# Patient Record
Sex: MALE | Race: WHITE | Employment: OTHER | URBAN - METROPOLITAN AREA
[De-identification: names, ages, dates, MRNs, and addresses within clinical notes are randomized per-mention and may not be internally consistent; named-entity substitution may affect disease eponyms.]

---

## 2017-03-21 PROBLEM — S06.5XAA SUBDURAL HEMATOMA: Status: ACTIVE | Noted: 2017-03-21

## 2017-03-23 PROBLEM — I10 HTN (HYPERTENSION): Status: ACTIVE | Noted: 2017-03-23

## 2017-03-23 PROBLEM — F03.90 DEMENTIA (HCC): Status: ACTIVE | Noted: 2017-03-23

## 2017-03-23 PROBLEM — R25.1 TREMORS OF NERVOUS SYSTEM: Status: ACTIVE | Noted: 2017-03-23

## 2017-03-23 PROBLEM — Z86.73 H/O: CVA (CEREBROVASCULAR ACCIDENT): Status: ACTIVE | Noted: 2017-03-23

## 2017-03-23 PROBLEM — D64.9 ANEMIA: Status: ACTIVE | Noted: 2017-03-23

## 2017-03-23 PROBLEM — D69.6 THROMBOCYTOPENIA (HCC): Status: ACTIVE | Noted: 2017-03-23

## 2018-02-02 PROBLEM — R33.9 URINARY RETENTION: Status: ACTIVE | Noted: 2018-02-02

## 2018-02-05 PROBLEM — N18.30 CHRONIC KIDNEY DISEASE, STAGE III (MODERATE) (HCC): Status: ACTIVE | Noted: 2018-02-05

## 2018-02-05 PROBLEM — E86.0 DEHYDRATION: Status: RESOLVED | Noted: 2018-02-05 | Resolved: 2018-02-05

## 2018-02-05 PROBLEM — R26.9 GAIT DISORDER: Status: ACTIVE | Noted: 2018-02-05

## 2018-02-05 PROBLEM — N18.30 CHRONIC KIDNEY DISEASE, STAGE III (MODERATE) (HCC): Status: RESOLVED | Noted: 2018-02-05 | Resolved: 2018-02-05

## 2018-02-05 PROBLEM — R55 DROP ATTACK: Status: ACTIVE | Noted: 2018-02-05

## 2018-02-06 PROBLEM — E44.0 PROTEIN-CALORIE MALNUTRITION, MODERATE (HCC): Status: ACTIVE | Noted: 2018-02-06

## 2018-02-13 PROBLEM — K27.4 PEPTIC ULCER DISEASE WITH HEMORRHAGE: Status: ACTIVE | Noted: 2018-02-13

## 2018-02-13 PROBLEM — F05 DELIRIUM SECONDARY TO MULTIPLE MEDICAL PROBLEMS: Status: ACTIVE | Noted: 2018-02-13

## 2018-02-13 PROBLEM — D62 ANEMIA ASSOCIATED WITH ACUTE BLOOD LOSS: Status: ACTIVE | Noted: 2018-02-13

## 2018-05-12 ENCOUNTER — APPOINTMENT (OUTPATIENT)
Dept: GENERAL RADIOLOGY | Age: 83
DRG: 872 | End: 2018-05-12
Payer: MEDICARE

## 2018-05-12 ENCOUNTER — HOSPITAL ENCOUNTER (INPATIENT)
Age: 83
LOS: 1 days | Discharge: HOME | DRG: 872 | End: 2018-05-13
Attending: EMERGENCY MEDICINE | Admitting: INTERNAL MEDICINE
Payer: MEDICARE

## 2018-05-12 DIAGNOSIS — R53.1 GENERALIZED WEAKNESS: ICD-10-CM

## 2018-05-12 DIAGNOSIS — D69.6 THROMBOCYTOPENIA (HCC): ICD-10-CM

## 2018-05-12 DIAGNOSIS — J10.1 INFLUENZA B: Primary | ICD-10-CM

## 2018-05-12 PROBLEM — A41.9 SEPSIS (HCC): Status: ACTIVE | Noted: 2018-05-12

## 2018-05-12 LAB
ALBUMIN SERPL-MCNC: 3.7 G/DL (ref 3.5–5.2)
ALP BLD-CCNC: 95 U/L (ref 40–129)
ALT SERPL-CCNC: 8 U/L (ref 0–40)
ANION GAP SERPL CALCULATED.3IONS-SCNC: 13 MMOL/L (ref 7–16)
AST SERPL-CCNC: 23 U/L (ref 0–39)
BACTERIA: ABNORMAL /HPF
BILIRUB SERPL-MCNC: 0.5 MG/DL (ref 0–1.2)
BILIRUBIN DIRECT: <0.2 MG/DL (ref 0–0.3)
BILIRUBIN URINE: NEGATIVE
BILIRUBIN, INDIRECT: NORMAL MG/DL (ref 0–1)
BLOOD, URINE: NEGATIVE
BUN BLDV-MCNC: 21 MG/DL (ref 8–23)
CALCIUM SERPL-MCNC: 8.2 MG/DL (ref 8.6–10.2)
CHLORIDE BLD-SCNC: 99 MMOL/L (ref 98–107)
CLARITY: CLEAR
CO2: 25 MMOL/L (ref 22–29)
COLOR: YELLOW
CREAT SERPL-MCNC: 0.9 MG/DL (ref 0.7–1.2)
EPITHELIAL CELLS, UA: ABNORMAL /HPF
GFR AFRICAN AMERICAN: >60
GFR NON-AFRICAN AMERICAN: >60 ML/MIN/1.73
GLUCOSE BLD-MCNC: 98 MG/DL (ref 74–109)
GLUCOSE URINE: NEGATIVE MG/DL
HCT VFR BLD CALC: 33.6 % (ref 37–54)
HEMOGLOBIN: 10.8 G/DL (ref 12.5–16.5)
INFLUENZA A BY PCR: NOT DETECTED
INFLUENZA B BY PCR: DETECTED
KETONES, URINE: NEGATIVE MG/DL
LACTIC ACID: 1.5 MMOL/L (ref 0.5–2.2)
LEUKOCYTE ESTERASE, URINE: NEGATIVE
MCH RBC QN AUTO: 30.7 PG (ref 26–35)
MCHC RBC AUTO-ENTMCNC: 32.1 % (ref 32–34.5)
MCV RBC AUTO: 95.5 FL (ref 80–99.9)
NITRITE, URINE: NEGATIVE
PDW BLD-RTO: 13.5 FL (ref 11.5–15)
PH UA: 6.5 (ref 5–9)
PLATELET # BLD: 66 E9/L (ref 130–450)
PLATELET CONFIRMATION: NORMAL
PMV BLD AUTO: 11.9 FL (ref 7–12)
POTASSIUM SERPL-SCNC: 4.8 MMOL/L (ref 3.5–5)
PROTEIN UA: 30 MG/DL
RBC # BLD: 3.52 E12/L (ref 3.8–5.8)
RBC UA: ABNORMAL /HPF (ref 0–2)
SODIUM BLD-SCNC: 137 MMOL/L (ref 132–146)
SPECIFIC GRAVITY UA: 1.02 (ref 1–1.03)
TOTAL PROTEIN: 7.6 G/DL (ref 6.4–8.3)
TROPONIN: <0.01 NG/ML (ref 0–0.03)
UROBILINOGEN, URINE: 0.2 E.U./DL
WBC # BLD: 4.4 E9/L (ref 4.5–11.5)
WBC UA: ABNORMAL /HPF (ref 0–5)

## 2018-05-12 PROCEDURE — 87088 URINE BACTERIA CULTURE: CPT

## 2018-05-12 PROCEDURE — 87040 BLOOD CULTURE FOR BACTERIA: CPT

## 2018-05-12 PROCEDURE — 2580000003 HC RX 258: Performed by: INTERNAL MEDICINE

## 2018-05-12 PROCEDURE — G0378 HOSPITAL OBSERVATION PER HR: HCPCS

## 2018-05-12 PROCEDURE — 84484 ASSAY OF TROPONIN QUANT: CPT

## 2018-05-12 PROCEDURE — 6360000002 HC RX W HCPCS: Performed by: INTERNAL MEDICINE

## 2018-05-12 PROCEDURE — 99285 EMERGENCY DEPT VISIT HI MDM: CPT

## 2018-05-12 PROCEDURE — 1200000000 HC SEMI PRIVATE

## 2018-05-12 PROCEDURE — 80048 BASIC METABOLIC PNL TOTAL CA: CPT

## 2018-05-12 PROCEDURE — 93005 ELECTROCARDIOGRAM TRACING: CPT | Performed by: EMERGENCY MEDICINE

## 2018-05-12 PROCEDURE — 80076 HEPATIC FUNCTION PANEL: CPT

## 2018-05-12 PROCEDURE — 87502 INFLUENZA DNA AMP PROBE: CPT

## 2018-05-12 PROCEDURE — 6370000000 HC RX 637 (ALT 250 FOR IP): Performed by: INTERNAL MEDICINE

## 2018-05-12 PROCEDURE — 36415 COLL VENOUS BLD VENIPUNCTURE: CPT

## 2018-05-12 PROCEDURE — 83605 ASSAY OF LACTIC ACID: CPT

## 2018-05-12 PROCEDURE — 6370000000 HC RX 637 (ALT 250 FOR IP): Performed by: EMERGENCY MEDICINE

## 2018-05-12 PROCEDURE — 85027 COMPLETE CBC AUTOMATED: CPT

## 2018-05-12 PROCEDURE — 81001 URINALYSIS AUTO W/SCOPE: CPT

## 2018-05-12 PROCEDURE — 71045 X-RAY EXAM CHEST 1 VIEW: CPT

## 2018-05-12 PROCEDURE — 96372 THER/PROPH/DIAG INJ SC/IM: CPT

## 2018-05-12 RX ORDER — ENALAPRIL MALEATE 2.5 MG/1
2.5 TABLET ORAL DAILY
Status: DISCONTINUED | OUTPATIENT
Start: 2018-05-13 | End: 2018-05-13 | Stop reason: HOSPADM

## 2018-05-12 RX ORDER — PRIMIDONE 50 MG/1
50 TABLET ORAL 2 TIMES DAILY
Status: DISCONTINUED | OUTPATIENT
Start: 2018-05-12 | End: 2018-05-13 | Stop reason: HOSPADM

## 2018-05-12 RX ORDER — PANTOPRAZOLE SODIUM 40 MG/1
40 TABLET, DELAYED RELEASE ORAL DAILY
Status: DISCONTINUED | OUTPATIENT
Start: 2018-05-12 | End: 2018-05-13 | Stop reason: HOSPADM

## 2018-05-12 RX ORDER — DONEPEZIL HYDROCHLORIDE 5 MG/1
10 TABLET, FILM COATED ORAL NIGHTLY
Status: DISCONTINUED | OUTPATIENT
Start: 2018-05-12 | End: 2018-05-13 | Stop reason: HOSPADM

## 2018-05-12 RX ORDER — SODIUM CHLORIDE 0.9 % (FLUSH) 0.9 %
10 SYRINGE (ML) INJECTION PRN
Status: DISCONTINUED | OUTPATIENT
Start: 2018-05-12 | End: 2018-05-13 | Stop reason: HOSPADM

## 2018-05-12 RX ORDER — POLYETHYLENE GLYCOL 3350 17 G/17G
17 POWDER, FOR SOLUTION ORAL 2 TIMES DAILY
Status: DISCONTINUED | OUTPATIENT
Start: 2018-05-13 | End: 2018-05-13 | Stop reason: HOSPADM

## 2018-05-12 RX ORDER — POLYETHYLENE GLYCOL 3350 17 G/17G
17 POWDER, FOR SOLUTION ORAL DAILY
COMMUNITY

## 2018-05-12 RX ORDER — ACETAMINOPHEN 325 MG/1
650 TABLET ORAL ONCE
Status: COMPLETED | OUTPATIENT
Start: 2018-05-12 | End: 2018-05-12

## 2018-05-12 RX ORDER — ONDANSETRON 2 MG/ML
4 INJECTION INTRAMUSCULAR; INTRAVENOUS EVERY 6 HOURS PRN
Status: DISCONTINUED | OUTPATIENT
Start: 2018-05-12 | End: 2018-05-13 | Stop reason: HOSPADM

## 2018-05-12 RX ORDER — ASPIRIN 81 MG/1
81 TABLET, CHEWABLE ORAL DAILY
Status: DISCONTINUED | OUTPATIENT
Start: 2018-05-12 | End: 2018-05-13 | Stop reason: HOSPADM

## 2018-05-12 RX ORDER — OSELTAMIVIR PHOSPHATE 30 MG/1
30 CAPSULE ORAL 2 TIMES DAILY
Status: DISCONTINUED | OUTPATIENT
Start: 2018-05-12 | End: 2018-05-13 | Stop reason: HOSPADM

## 2018-05-12 RX ORDER — PRAVASTATIN SODIUM 20 MG
20 TABLET ORAL DAILY
Status: DISCONTINUED | OUTPATIENT
Start: 2018-05-12 | End: 2018-05-13 | Stop reason: HOSPADM

## 2018-05-12 RX ORDER — SODIUM CHLORIDE 0.9 % (FLUSH) 0.9 %
10 SYRINGE (ML) INJECTION EVERY 12 HOURS SCHEDULED
Status: DISCONTINUED | OUTPATIENT
Start: 2018-05-12 | End: 2018-05-13 | Stop reason: HOSPADM

## 2018-05-12 RX ADMIN — Medication 400 MG: at 21:23

## 2018-05-12 RX ADMIN — Medication 10 ML: at 21:23

## 2018-05-12 RX ADMIN — ASPIRIN 81 MG 81 MG: 81 TABLET ORAL at 21:23

## 2018-05-12 RX ADMIN — OSELTAMIVIR PHOSPHATE 30 MG: 30 CAPSULE ORAL at 13:50

## 2018-05-12 RX ADMIN — ACETAMINOPHEN 650 MG: 325 TABLET ORAL at 12:27

## 2018-05-12 RX ADMIN — ENOXAPARIN SODIUM 40 MG: 40 INJECTION SUBCUTANEOUS at 21:23

## 2018-05-12 RX ADMIN — DONEPEZIL HYDROCHLORIDE 10 MG: 5 TABLET, FILM COATED ORAL at 21:22

## 2018-05-12 RX ADMIN — OSELTAMIVIR PHOSPHATE 30 MG: 30 CAPSULE ORAL at 21:23

## 2018-05-12 ASSESSMENT — ENCOUNTER SYMPTOMS
VOMITING: 0
DIARRHEA: 0
NAUSEA: 0
SORE THROAT: 0
CHEST TIGHTNESS: 0
COUGH: 1
ABDOMINAL PAIN: 0
SHORTNESS OF BREATH: 0
CONSTIPATION: 1

## 2018-05-12 ASSESSMENT — PAIN SCALES - GENERAL
PAINLEVEL_OUTOF10: 0
PAINLEVEL_OUTOF10: 0

## 2018-05-13 VITALS
HEIGHT: 62 IN | SYSTOLIC BLOOD PRESSURE: 144 MMHG | TEMPERATURE: 98.3 F | DIASTOLIC BLOOD PRESSURE: 60 MMHG | WEIGHT: 143 LBS | RESPIRATION RATE: 16 BRPM | OXYGEN SATURATION: 91 % | HEART RATE: 63 BPM | BODY MASS INDEX: 26.31 KG/M2

## 2018-05-13 LAB
ANION GAP SERPL CALCULATED.3IONS-SCNC: 13 MMOL/L (ref 7–16)
BUN BLDV-MCNC: 17 MG/DL (ref 8–23)
CALCIUM SERPL-MCNC: 7.8 MG/DL (ref 8.6–10.2)
CHLORIDE BLD-SCNC: 99 MMOL/L (ref 98–107)
CHOLESTEROL, TOTAL: 112 MG/DL (ref 0–199)
CO2: 23 MMOL/L (ref 22–29)
CREAT SERPL-MCNC: 0.9 MG/DL (ref 0.7–1.2)
GFR AFRICAN AMERICAN: >60
GFR NON-AFRICAN AMERICAN: >60 ML/MIN/1.73
GLUCOSE BLD-MCNC: 126 MG/DL (ref 74–109)
HBA1C MFR BLD: 4.7 % (ref 4.8–5.9)
HCT VFR BLD CALC: 30.7 % (ref 37–54)
HDLC SERPL-MCNC: 56 MG/DL
HEMOGLOBIN: 10.1 G/DL (ref 12.5–16.5)
LDL CHOLESTEROL CALCULATED: 47 MG/DL (ref 0–99)
MAGNESIUM: 2.2 MG/DL (ref 1.6–2.6)
MCH RBC QN AUTO: 30.8 PG (ref 26–35)
MCHC RBC AUTO-ENTMCNC: 32.9 % (ref 32–34.5)
MCV RBC AUTO: 93.6 FL (ref 80–99.9)
PDW BLD-RTO: 13.5 FL (ref 11.5–15)
PHOSPHORUS: 2.4 MG/DL (ref 2.5–4.5)
PLATELET # BLD: 61 E9/L (ref 130–450)
PLATELET CONFIRMATION: NORMAL
PMV BLD AUTO: 11.9 FL (ref 7–12)
POTASSIUM SERPL-SCNC: 3.9 MMOL/L (ref 3.5–5)
RBC # BLD: 3.28 E12/L (ref 3.8–5.8)
SODIUM BLD-SCNC: 135 MMOL/L (ref 132–146)
TRIGL SERPL-MCNC: 46 MG/DL (ref 0–149)
TSH SERPL DL<=0.05 MIU/L-ACNC: 1.9 UIU/ML (ref 0.27–4.2)
VLDLC SERPL CALC-MCNC: 9 MG/DL
WBC # BLD: 3.2 E9/L (ref 4.5–11.5)

## 2018-05-13 PROCEDURE — 6370000000 HC RX 637 (ALT 250 FOR IP): Performed by: INTERNAL MEDICINE

## 2018-05-13 PROCEDURE — 6370000000 HC RX 637 (ALT 250 FOR IP): Performed by: EMERGENCY MEDICINE

## 2018-05-13 PROCEDURE — 2580000003 HC RX 258: Performed by: INTERNAL MEDICINE

## 2018-05-13 PROCEDURE — 83735 ASSAY OF MAGNESIUM: CPT

## 2018-05-13 PROCEDURE — 80061 LIPID PANEL: CPT

## 2018-05-13 PROCEDURE — 84100 ASSAY OF PHOSPHORUS: CPT

## 2018-05-13 PROCEDURE — 97161 PT EVAL LOW COMPLEX 20 MIN: CPT | Performed by: PHYSICAL THERAPIST

## 2018-05-13 PROCEDURE — 6360000002 HC RX W HCPCS: Performed by: INTERNAL MEDICINE

## 2018-05-13 PROCEDURE — 36415 COLL VENOUS BLD VENIPUNCTURE: CPT

## 2018-05-13 PROCEDURE — G0378 HOSPITAL OBSERVATION PER HR: HCPCS

## 2018-05-13 PROCEDURE — G8979 MOBILITY GOAL STATUS: HCPCS | Performed by: PHYSICAL THERAPIST

## 2018-05-13 PROCEDURE — 83036 HEMOGLOBIN GLYCOSYLATED A1C: CPT

## 2018-05-13 PROCEDURE — G8978 MOBILITY CURRENT STATUS: HCPCS | Performed by: PHYSICAL THERAPIST

## 2018-05-13 PROCEDURE — 80048 BASIC METABOLIC PNL TOTAL CA: CPT

## 2018-05-13 PROCEDURE — 84443 ASSAY THYROID STIM HORMONE: CPT

## 2018-05-13 PROCEDURE — 96372 THER/PROPH/DIAG INJ SC/IM: CPT

## 2018-05-13 PROCEDURE — 96374 THER/PROPH/DIAG INJ IV PUSH: CPT

## 2018-05-13 PROCEDURE — 94640 AIRWAY INHALATION TREATMENT: CPT

## 2018-05-13 PROCEDURE — 85027 COMPLETE CBC AUTOMATED: CPT

## 2018-05-13 PROCEDURE — 97116 GAIT TRAINING THERAPY: CPT | Performed by: PHYSICAL THERAPIST

## 2018-05-13 RX ORDER — IPRATROPIUM BROMIDE AND ALBUTEROL SULFATE 2.5; .5 MG/3ML; MG/3ML
1 SOLUTION RESPIRATORY (INHALATION)
Status: DISCONTINUED | OUTPATIENT
Start: 2018-05-13 | End: 2018-05-13 | Stop reason: HOSPADM

## 2018-05-13 RX ORDER — OSELTAMIVIR PHOSPHATE 30 MG/1
30 CAPSULE ORAL 2 TIMES DAILY
Qty: 10 CAPSULE | Refills: 0 | Status: SHIPPED | OUTPATIENT
Start: 2018-05-13 | End: 2018-05-18

## 2018-05-13 RX ORDER — ALBUTEROL SULFATE 90 UG/1
2 AEROSOL, METERED RESPIRATORY (INHALATION) EVERY 6 HOURS PRN
Qty: 1 INHALER | Refills: 3 | Status: SHIPPED | OUTPATIENT
Start: 2018-05-13 | End: 2019-11-12

## 2018-05-13 RX ORDER — SODIUM CHLORIDE 9 MG/ML
INJECTION, SOLUTION INTRAVENOUS CONTINUOUS
Status: ACTIVE | OUTPATIENT
Start: 2018-05-13 | End: 2018-05-13

## 2018-05-13 RX ORDER — METHYLPREDNISOLONE SODIUM SUCCINATE 125 MG/2ML
60 INJECTION, POWDER, LYOPHILIZED, FOR SOLUTION INTRAMUSCULAR; INTRAVENOUS EVERY 12 HOURS
Status: DISCONTINUED | OUTPATIENT
Start: 2018-05-13 | End: 2018-05-13 | Stop reason: HOSPADM

## 2018-05-13 RX ORDER — PREDNISONE 20 MG/1
20 TABLET ORAL DAILY
Qty: 5 TABLET | Refills: 0 | Status: SHIPPED | OUTPATIENT
Start: 2018-05-13 | End: 2018-05-18

## 2018-05-13 RX ADMIN — Medication 10 ML: at 08:30

## 2018-05-13 RX ADMIN — ASPIRIN 81 MG 81 MG: 81 TABLET ORAL at 08:30

## 2018-05-13 RX ADMIN — IPRATROPIUM BROMIDE AND ALBUTEROL SULFATE 1 AMPULE: .5; 3 SOLUTION RESPIRATORY (INHALATION) at 18:02

## 2018-05-13 RX ADMIN — METHYLPREDNISOLONE SODIUM SUCCINATE 60 MG: 125 INJECTION, POWDER, FOR SOLUTION INTRAMUSCULAR; INTRAVENOUS at 10:32

## 2018-05-13 RX ADMIN — SODIUM CHLORIDE: 9 INJECTION, SOLUTION INTRAVENOUS at 10:26

## 2018-05-13 RX ADMIN — PANTOPRAZOLE SODIUM 40 MG: 40 TABLET, DELAYED RELEASE ORAL at 08:29

## 2018-05-13 RX ADMIN — POLYETHYLENE GLYCOL (3350) 17 G: 17 POWDER, FOR SOLUTION ORAL at 11:38

## 2018-05-13 RX ADMIN — POLYETHYLENE GLYCOL (3350) 17 G: 17 POWDER, FOR SOLUTION ORAL at 08:30

## 2018-05-13 RX ADMIN — PRIMIDONE 50 MG: 50 TABLET ORAL at 08:29

## 2018-05-13 RX ADMIN — ENALAPRIL MALEATE 2.5 MG: 2.5 TABLET ORAL at 08:29

## 2018-05-13 RX ADMIN — PRAVASTATIN SODIUM 20 MG: 20 TABLET ORAL at 08:29

## 2018-05-13 RX ADMIN — OSELTAMIVIR PHOSPHATE 30 MG: 30 CAPSULE ORAL at 08:29

## 2018-05-13 RX ADMIN — ENOXAPARIN SODIUM 40 MG: 40 INJECTION SUBCUTANEOUS at 08:30

## 2018-05-13 ASSESSMENT — PAIN SCALES - GENERAL: PAINLEVEL_OUTOF10: 0

## 2018-05-14 LAB
EKG ATRIAL RATE: 79 BPM
EKG P AXIS: 4 DEGREES
EKG P-R INTERVAL: 172 MS
EKG Q-T INTERVAL: 390 MS
EKG QRS DURATION: 140 MS
EKG QTC CALCULATION (BAZETT): 447 MS
EKG R AXIS: 66 DEGREES
EKG T AXIS: 42 DEGREES
EKG VENTRICULAR RATE: 79 BPM
URINE CULTURE, ROUTINE: NORMAL

## 2018-05-17 LAB
BLOOD CULTURE, ROUTINE: NORMAL
CULTURE, BLOOD 2: NORMAL

## 2018-08-14 ENCOUNTER — HOSPITAL ENCOUNTER (EMERGENCY)
Age: 83
Discharge: HOME | End: 2018-08-14
Payer: MEDICARE

## 2018-08-14 ENCOUNTER — APPOINTMENT (OUTPATIENT)
Dept: CT IMAGING | Age: 83
End: 2018-08-14
Payer: MEDICARE

## 2018-08-14 VITALS
RESPIRATION RATE: 16 BRPM | HEART RATE: 64 BPM | OXYGEN SATURATION: 98 % | SYSTOLIC BLOOD PRESSURE: 167 MMHG | BODY MASS INDEX: 23.8 KG/M2 | TEMPERATURE: 98.2 F | HEIGHT: 65 IN | DIASTOLIC BLOOD PRESSURE: 77 MMHG

## 2018-08-14 DIAGNOSIS — S09.90XA INJURY OF HEAD, INITIAL ENCOUNTER: Primary | ICD-10-CM

## 2018-08-14 DIAGNOSIS — S01.81XA LACERATION OF FOREHEAD, INITIAL ENCOUNTER: ICD-10-CM

## 2018-08-14 PROCEDURE — 70450 CT HEAD/BRAIN W/O DYE: CPT

## 2018-08-14 PROCEDURE — 99283 EMERGENCY DEPT VISIT LOW MDM: CPT

## 2018-08-14 PROCEDURE — 6370000000 HC RX 637 (ALT 250 FOR IP): Performed by: NURSE PRACTITIONER

## 2018-08-14 PROCEDURE — 72125 CT NECK SPINE W/O DYE: CPT

## 2018-08-14 RX ORDER — DIAPER,BRIEF,INFANT-TODD,DISP
EACH MISCELLANEOUS ONCE
Status: COMPLETED | OUTPATIENT
Start: 2018-08-14 | End: 2018-08-14

## 2018-08-14 RX ADMIN — BACITRACIN ZINC: 500 OINTMENT TOPICAL at 19:38

## 2018-08-14 NOTE — ED PROVIDER NOTES
answered at this time and they are agreeable with the plan.      --------------------------------- IMPRESSION AND DISPOSITION ---------------------------------    IMPRESSION  1. Injury of head, initial encounter    2.  Laceration of forehead, initial encounter        DISPOSITION  Disposition: Discharge to home  Patient condition is good                 STEPHON Lawson CNP  08/14/18 1953

## 2018-11-03 ENCOUNTER — HOSPITAL ENCOUNTER (EMERGENCY)
Age: 83
Discharge: HOME | End: 2018-11-03
Payer: MEDICARE

## 2018-11-03 VITALS
TEMPERATURE: 97.7 F | SYSTOLIC BLOOD PRESSURE: 169 MMHG | HEART RATE: 79 BPM | OXYGEN SATURATION: 95 % | HEIGHT: 66 IN | WEIGHT: 143 LBS | BODY MASS INDEX: 22.98 KG/M2 | RESPIRATION RATE: 18 BRPM | DIASTOLIC BLOOD PRESSURE: 71 MMHG

## 2018-11-03 DIAGNOSIS — S51.012A SKIN TEAR OF LEFT ELBOW WITHOUT COMPLICATION, INITIAL ENCOUNTER: Primary | ICD-10-CM

## 2018-11-03 PROCEDURE — 99282 EMERGENCY DEPT VISIT SF MDM: CPT

## 2018-11-03 PROCEDURE — 90715 TDAP VACCINE 7 YRS/> IM: CPT | Performed by: NURSE PRACTITIONER

## 2018-11-03 PROCEDURE — 12002 RPR S/N/AX/GEN/TRNK2.6-7.5CM: CPT

## 2018-11-03 PROCEDURE — 6360000002 HC RX W HCPCS: Performed by: NURSE PRACTITIONER

## 2018-11-03 PROCEDURE — 90471 IMMUNIZATION ADMIN: CPT | Performed by: NURSE PRACTITIONER

## 2018-11-03 RX ORDER — CEPHALEXIN 500 MG/1
500 CAPSULE ORAL 4 TIMES DAILY
Qty: 20 CAPSULE | Refills: 0 | Status: SHIPPED | OUTPATIENT
Start: 2018-11-03 | End: 2018-11-08

## 2018-11-03 RX ADMIN — TETANUS TOXOID, REDUCED DIPHTHERIA TOXOID AND ACELLULAR PERTUSSIS VACCINE, ADSORBED 0.5 ML: 5; 2.5; 8; 8; 2.5 SUSPENSION INTRAMUSCULAR at 15:16

## 2019-02-28 ENCOUNTER — APPOINTMENT (OUTPATIENT)
Dept: CT IMAGING | Age: 84
End: 2019-02-28
Payer: MEDICARE

## 2019-02-28 ENCOUNTER — HOSPITAL ENCOUNTER (EMERGENCY)
Age: 84
Discharge: HOME | End: 2019-02-28
Attending: EMERGENCY MEDICINE
Payer: MEDICARE

## 2019-02-28 VITALS
RESPIRATION RATE: 14 BRPM | TEMPERATURE: 97.6 F | OXYGEN SATURATION: 97 % | HEIGHT: 67 IN | DIASTOLIC BLOOD PRESSURE: 65 MMHG | WEIGHT: 141 LBS | BODY MASS INDEX: 22.13 KG/M2 | HEART RATE: 58 BPM | SYSTOLIC BLOOD PRESSURE: 140 MMHG

## 2019-02-28 DIAGNOSIS — S09.90XA CLOSED HEAD INJURY, INITIAL ENCOUNTER: Primary | ICD-10-CM

## 2019-02-28 DIAGNOSIS — H11.31 SUBCONJUNCTIVAL HEMORRHAGE OF RIGHT EYE: ICD-10-CM

## 2019-02-28 PROCEDURE — 72125 CT NECK SPINE W/O DYE: CPT

## 2019-02-28 PROCEDURE — 70450 CT HEAD/BRAIN W/O DYE: CPT

## 2019-02-28 PROCEDURE — 99283 EMERGENCY DEPT VISIT LOW MDM: CPT

## 2019-02-28 ASSESSMENT — ENCOUNTER SYMPTOMS
SHORTNESS OF BREATH: 0
BACK PAIN: 0
VOMITING: 0
NAUSEA: 0

## 2019-03-29 ENCOUNTER — HOSPITAL ENCOUNTER (EMERGENCY)
Age: 84
Discharge: HOME | End: 2019-03-29
Attending: EMERGENCY MEDICINE
Payer: MEDICARE

## 2019-03-29 VITALS
HEIGHT: 68 IN | SYSTOLIC BLOOD PRESSURE: 126 MMHG | TEMPERATURE: 97.9 F | BODY MASS INDEX: 20.93 KG/M2 | DIASTOLIC BLOOD PRESSURE: 60 MMHG | WEIGHT: 138.13 LBS | OXYGEN SATURATION: 94 % | HEART RATE: 56 BPM | RESPIRATION RATE: 16 BRPM

## 2019-03-29 DIAGNOSIS — L03.211 CELLULITIS OF FACE: Primary | ICD-10-CM

## 2019-03-29 PROCEDURE — 99283 EMERGENCY DEPT VISIT LOW MDM: CPT

## 2019-03-29 RX ORDER — DOXYCYCLINE 100 MG/1
100 CAPSULE ORAL 2 TIMES DAILY
Qty: 20 CAPSULE | Refills: 0 | Status: SHIPPED | OUTPATIENT
Start: 2019-03-29 | End: 2019-04-08

## 2019-03-29 NOTE — ED PROVIDER NOTES
ED Attending  CC: No    HPI:  3/29/19,   Time: 11:03 AM         Jere Padron is a 80 y.o. male presenting to the ED for swelling on the left side of his face, beginning this a.m. ago. The complaint has been constant, mild in severity, and worsened by nothing. States that he woke up this morning and noted that the left eye felt swollen and painful. He is normally blind into the left eye and denies any known fall or known injury. States he feels very swollen around the left eye. Denies any known injury. Denies any fever. ROS:   Pertinent positives and negatives are stated within HPI, all other systems reviewed and are negative.  --------------------------------------------- PAST HISTORY ---------------------------------------------  Past Medical History:  has a past medical history of CAD (coronary artery disease), Hemorrhage, HTN (hypertension), Hyperlipidemia, Raynaud disease, Tremors of nervous system, and Unspecified cerebral artery occlusion with cerebral infarction. Past Surgical History:  has a past surgical history that includes Coronary angioplasty with stent. Social History:  reports that he quit smoking about 43 years ago. He has never used smokeless tobacco. He reports that he drinks alcohol. He reports that he does not use drugs. Family History: family history is not on file. The patients home medications have been reviewed. Allergies: Ativan [lorazepam]; Clindamycin/lincomycin; and Pcn [penicillins]    -------------------------------------------------- RESULTS -------------------------------------------------  All laboratory and radiology results have been personally reviewed by myself   LABS:  No results found for this visit on 03/29/19.     RADIOLOGY:  Interpreted by Radiologist.  No orders to display       ------------------------- NURSING NOTES AND VITALS REVIEWED ---------------------------   The nursing notes within the ED encounter and vital signs as below have been ---------------------------------    IMPRESSION  1.  Cellulitis of face        DISPOSITION  Disposition: Discharge to home  Patient condition is good                 STEPHON Montgomery CNP  03/30/19 0935

## 2019-04-14 ENCOUNTER — HOSPITAL ENCOUNTER (EMERGENCY)
Age: 84
Discharge: HOME | End: 2019-04-14
Payer: MEDICARE

## 2019-04-14 VITALS
OXYGEN SATURATION: 97 % | SYSTOLIC BLOOD PRESSURE: 142 MMHG | WEIGHT: 138 LBS | TEMPERATURE: 98 F | DIASTOLIC BLOOD PRESSURE: 80 MMHG | RESPIRATION RATE: 16 BRPM | HEIGHT: 66 IN | HEART RATE: 60 BPM | BODY MASS INDEX: 22.18 KG/M2

## 2019-04-14 DIAGNOSIS — H11.31 SUBCONJUNCTIVAL HEMORRHAGE OF RIGHT EYE: Primary | ICD-10-CM

## 2019-04-14 DIAGNOSIS — H10.023 OTHER MUCOPURULENT CONJUNCTIVITIS OF BOTH EYES: ICD-10-CM

## 2019-04-14 PROCEDURE — 99282 EMERGENCY DEPT VISIT SF MDM: CPT

## 2019-04-14 PROCEDURE — 6370000000 HC RX 637 (ALT 250 FOR IP): Performed by: PHYSICIAN ASSISTANT

## 2019-04-14 RX ORDER — TOBRAMYCIN 3 MG/ML
2 SOLUTION/ DROPS OPHTHALMIC ONCE
Status: COMPLETED | OUTPATIENT
Start: 2019-04-14 | End: 2019-04-14

## 2019-04-14 RX ORDER — TETRACAINE HYDROCHLORIDE 5 MG/ML
2 SOLUTION OPHTHALMIC ONCE
Status: COMPLETED | OUTPATIENT
Start: 2019-04-14 | End: 2019-04-14

## 2019-04-14 RX ORDER — TOBRAMYCIN 3 MG/ML
1 SOLUTION/ DROPS OPHTHALMIC EVERY 4 HOURS
Qty: 1 BOTTLE | Refills: 0 | Status: SHIPPED | OUTPATIENT
Start: 2019-04-14 | End: 2019-04-24

## 2019-04-14 RX ADMIN — TETRACAINE HYDROCHLORIDE 2 DROP: 5 SOLUTION OPHTHALMIC at 19:43

## 2019-04-14 RX ADMIN — FLUORESCEIN SODIUM 2 MG: 1 STRIP OPHTHALMIC at 19:43

## 2019-04-14 RX ADMIN — TOBRAMYCIN 2 DROP: 3 SOLUTION/ DROPS OPHTHALMIC at 19:43

## 2019-04-14 ASSESSMENT — PAIN DESCRIPTION - PAIN TYPE: TYPE: ACUTE PAIN

## 2019-04-14 ASSESSMENT — PAIN DESCRIPTION - ONSET: ONSET: ON-GOING

## 2019-04-14 ASSESSMENT — PAIN SCALES - GENERAL: PAINLEVEL_OUTOF10: 2

## 2019-04-14 ASSESSMENT — PAIN DESCRIPTION - LOCATION: LOCATION: EYE

## 2019-04-14 ASSESSMENT — PAIN DESCRIPTION - ORIENTATION: ORIENTATION: RIGHT;LEFT

## 2019-04-14 ASSESSMENT — PAIN - FUNCTIONAL ASSESSMENT: PAIN_FUNCTIONAL_ASSESSMENT: PREVENTS OR INTERFERES SOME ACTIVE ACTIVITIES AND ADLS

## 2019-04-14 NOTE — ED PROVIDER NOTES
[facility]  HPI:  4/14/19, Time: 6:56 PM         Alexsander Corea is a 80 y.o. male presenting to the ED for bilateral eye erythema , beginning today    The complaint has been persistent, mild in severity, and worsened by nothing patients brought in for erythema of the bilateral eyes right greater than left noted this morning patient states he has some discomfort burning  at the corner of the eye. 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 He was recently treated for cellulitis of the face 2 weeks ago which has improved. Patient is not on any blood thinners. Is been no runny nose congestion. No eye drainage or matting noted    Review of Systems:   Pertinent positives and negatives are stated within HPI, all other systems reviewed and are negative.          --------------------------------------------- PAST HISTORY ---------------------------------------------  Past Medical History:  has a past medical history of CAD (coronary artery disease), Hemorrhage, HTN (hypertension), Hyperlipidemia, Raynaud disease, Tremors of nervous system, and Unspecified cerebral artery occlusion with cerebral infarction. Past Surgical History:  has a past surgical history that includes Coronary angioplasty with stent. Social History:  reports that he quit smoking about 43 years ago. He has never used smokeless tobacco. He reports that he drinks alcohol. He reports that he does not use drugs. Family History: family history is not on file. The patients home medications have been reviewed. Allergies: Ativan [lorazepam]; Clindamycin/lincomycin; and Pcn [penicillins]    -------------------------------------------------- RESULTS -------------------------------------------------  All laboratory and radiology results have been personally reviewed by myself   LABS:  No results found for this visit on 04/14/19.     RADIOLOGY:  Interpreted by Radiologist.  No orders to display       ------------------------- NURSING NOTES AND VITALS REVIEWED ---------------------------   The nursing notes within the ED encounter and vital signs as below have been reviewed. BP (!) 142/80   Pulse 60   Temp 98 °F (36.7 °C) (Oral)   Resp 16   Ht 5' 6\" (1.676 m)   Wt 138 lb (62.6 kg)   SpO2 97%   BMI 22.27 kg/m²   Oxygen Saturation Interpretation: Normal      ---------------------------------------------------PHYSICAL EXAM--------------------------------------      Constitutional/General: Alert and oriented x3, well appearing, non toxic in NAD  Head: Normocephalic and atraumatic  Eyes: PERRL, EOMI right eye with subconjunctival hemorrhage medially. Minimal erythema of the conjunctiva of the left eye. Mouth: Oropharynx clear, handling secretions, no trismus  Neck: Supple, full ROM,   Pulmonary: Lungs clear to auscultation bilaterally, no wheezes, rales, or rhonchi. Not in respiratory distress  Cardiovascular:  Regular rate and rhythm, no murmurs, gallops, or rubs. 2+ distal pulses  Abdomen: Soft, non tender, non distended,   Extremities: Moves all extremities x 4. Warm and well perfused  Skin: warm and dry without rash  Neurologic: GCS 15,  Psych: Normal Affect      ------------------------------ ED COURSE/MEDICAL DECISION MAKING----------------------  Medications   tobramycin (TOBREX) 0.3 % ophthalmic solution 2 drop (2 drops Ophthalmic Given 4/14/19 1943)   fluorescein ophthalmic strip 2 mg (2 mg Both Eyes Given by Other 4/14/19 1943)   tetracaine (TETRAVISC) 0.5 % ophthalmic solution 2 drop (2 drops Ophthalmic Given by Other 4/14/19 1943)         ED COURSE:       Medical Decision Making:    Patient came in with complaint of bilateral eye erythema patient with subconjunctival hemorrhage of the right eye  he was treated for conjunctivitis placed on TobraDex follow-up with ophthalmology in 1-2 days      Counseling:    The emergency provider has spoken with the patient and discussed todays results, in addition to providing specific details for the

## 2019-11-12 RX ORDER — CHOLECALCIFEROL (VITAMIN D3) 125 MCG
5 CAPSULE ORAL NIGHTLY PRN
COMMUNITY
End: 2021-06-07

## 2019-11-13 ENCOUNTER — HOSPITAL ENCOUNTER (OUTPATIENT)
Age: 84
Setting detail: OUTPATIENT SURGERY
Discharge: HOME | End: 2019-11-13
Attending: INTERNAL MEDICINE | Admitting: INTERNAL MEDICINE
Payer: MEDICARE

## 2019-11-13 ENCOUNTER — ANESTHESIA EVENT (OUTPATIENT)
Dept: ENDOSCOPY | Age: 84
End: 2019-11-13
Payer: MEDICARE

## 2019-11-13 ENCOUNTER — ANESTHESIA (OUTPATIENT)
Dept: ENDOSCOPY | Age: 84
End: 2019-11-13
Payer: MEDICARE

## 2019-11-13 VITALS
DIASTOLIC BLOOD PRESSURE: 65 MMHG | SYSTOLIC BLOOD PRESSURE: 131 MMHG | RESPIRATION RATE: 10 BRPM | OXYGEN SATURATION: 97 %

## 2019-11-13 VITALS
BODY MASS INDEX: 26.46 KG/M2 | WEIGHT: 134.8 LBS | RESPIRATION RATE: 16 BRPM | OXYGEN SATURATION: 96 % | SYSTOLIC BLOOD PRESSURE: 126 MMHG | DIASTOLIC BLOOD PRESSURE: 70 MMHG | HEIGHT: 60 IN | TEMPERATURE: 96.6 F | HEART RATE: 64 BPM

## 2019-11-13 DIAGNOSIS — Z01.818 PREOPERATIVE TESTING: Primary | ICD-10-CM

## 2019-11-13 LAB
ALBUMIN SERPL-MCNC: 4 G/DL (ref 3.5–5.2)
ALP BLD-CCNC: 111 U/L (ref 40–129)
ALT SERPL-CCNC: 7 U/L (ref 0–40)
ANION GAP SERPL CALCULATED.3IONS-SCNC: 12 MMOL/L (ref 7–16)
AST SERPL-CCNC: 23 U/L (ref 0–39)
BILIRUB SERPL-MCNC: 0.4 MG/DL (ref 0–1.2)
BUN BLDV-MCNC: 34 MG/DL (ref 8–23)
CALCIUM SERPL-MCNC: 9.2 MG/DL (ref 8.6–10.2)
CHLORIDE BLD-SCNC: 109 MMOL/L (ref 98–107)
CO2: 22 MMOL/L (ref 22–29)
CREAT SERPL-MCNC: 1.2 MG/DL (ref 0.7–1.2)
GFR AFRICAN AMERICAN: >60
GFR NON-AFRICAN AMERICAN: 56 ML/MIN/1.73
GLUCOSE BLD-MCNC: 108 MG/DL (ref 74–99)
HCT VFR BLD CALC: 30.4 % (ref 37–54)
HEMOGLOBIN: 9.7 G/DL (ref 12.5–16.5)
MCH RBC QN AUTO: 31.7 PG (ref 26–35)
MCHC RBC AUTO-ENTMCNC: 31.9 % (ref 32–34.5)
MCV RBC AUTO: 99.3 FL (ref 80–99.9)
PDW BLD-RTO: 13.5 FL (ref 11.5–15)
PLATELET # BLD: 115 E9/L (ref 130–450)
PMV BLD AUTO: 11.9 FL (ref 7–12)
POTASSIUM REFLEX MAGNESIUM: 4.8 MMOL/L (ref 3.5–5)
RBC # BLD: 3.06 E12/L (ref 3.8–5.8)
SODIUM BLD-SCNC: 143 MMOL/L (ref 132–146)
TOTAL PROTEIN: 8 G/DL (ref 6.4–8.3)
WBC # BLD: 4.6 E9/L (ref 4.5–11.5)

## 2019-11-13 PROCEDURE — 80053 COMPREHEN METABOLIC PANEL: CPT

## 2019-11-13 PROCEDURE — 3700000001 HC ADD 15 MINUTES (ANESTHESIA): Performed by: INTERNAL MEDICINE

## 2019-11-13 PROCEDURE — 88305 TISSUE EXAM BY PATHOLOGIST: CPT

## 2019-11-13 PROCEDURE — 2580000003 HC RX 258: Performed by: NURSE ANESTHETIST, CERTIFIED REGISTERED

## 2019-11-13 PROCEDURE — 88312 SPECIAL STAINS GROUP 1: CPT

## 2019-11-13 PROCEDURE — 3700000000 HC ANESTHESIA ATTENDED CARE: Performed by: INTERNAL MEDICINE

## 2019-11-13 PROCEDURE — C9113 INJ PANTOPRAZOLE SODIUM, VIA: HCPCS | Performed by: INTERNAL MEDICINE

## 2019-11-13 PROCEDURE — 6360000002 HC RX W HCPCS: Performed by: NURSE ANESTHETIST, CERTIFIED REGISTERED

## 2019-11-13 PROCEDURE — 7100000011 HC PHASE II RECOVERY - ADDTL 15 MIN: Performed by: INTERNAL MEDICINE

## 2019-11-13 PROCEDURE — 3609012400 HC EGD TRANSORAL BIOPSY SINGLE/MULTIPLE: Performed by: INTERNAL MEDICINE

## 2019-11-13 PROCEDURE — 7100000010 HC PHASE II RECOVERY - FIRST 15 MIN: Performed by: INTERNAL MEDICINE

## 2019-11-13 PROCEDURE — 6360000002 HC RX W HCPCS: Performed by: INTERNAL MEDICINE

## 2019-11-13 PROCEDURE — 2709999900 HC NON-CHARGEABLE SUPPLY: Performed by: INTERNAL MEDICINE

## 2019-11-13 PROCEDURE — 85027 COMPLETE CBC AUTOMATED: CPT

## 2019-11-13 PROCEDURE — 2580000003 HC RX 258: Performed by: ANESTHESIOLOGY

## 2019-11-13 PROCEDURE — 36415 COLL VENOUS BLD VENIPUNCTURE: CPT

## 2019-11-13 RX ORDER — SODIUM CHLORIDE 0.9 % (FLUSH) 0.9 %
10 SYRINGE (ML) INJECTION EVERY 12 HOURS SCHEDULED
Status: DISCONTINUED | OUTPATIENT
Start: 2019-11-13 | End: 2019-11-13 | Stop reason: HOSPADM

## 2019-11-13 RX ORDER — SODIUM CHLORIDE, SODIUM LACTATE, POTASSIUM CHLORIDE, CALCIUM CHLORIDE 600; 310; 30; 20 MG/100ML; MG/100ML; MG/100ML; MG/100ML
INJECTION, SOLUTION INTRAVENOUS CONTINUOUS PRN
Status: DISCONTINUED | OUTPATIENT
Start: 2019-11-13 | End: 2019-11-13 | Stop reason: SDUPTHER

## 2019-11-13 RX ORDER — PROPOFOL 10 MG/ML
INJECTION, EMULSION INTRAVENOUS PRN
Status: DISCONTINUED | OUTPATIENT
Start: 2019-11-13 | End: 2019-11-13 | Stop reason: SDUPTHER

## 2019-11-13 RX ORDER — SODIUM CHLORIDE 9 MG/ML
10 INJECTION INTRAVENOUS DAILY
Status: DISCONTINUED | OUTPATIENT
Start: 2019-11-13 | End: 2019-11-13 | Stop reason: HOSPADM

## 2019-11-13 RX ORDER — SODIUM CHLORIDE, SODIUM LACTATE, POTASSIUM CHLORIDE, CALCIUM CHLORIDE 600; 310; 30; 20 MG/100ML; MG/100ML; MG/100ML; MG/100ML
INJECTION, SOLUTION INTRAVENOUS CONTINUOUS
Status: DISCONTINUED | OUTPATIENT
Start: 2019-11-13 | End: 2019-11-13 | Stop reason: HOSPADM

## 2019-11-13 RX ORDER — PANTOPRAZOLE SODIUM 40 MG/10ML
40 INJECTION, POWDER, LYOPHILIZED, FOR SOLUTION INTRAVENOUS ONCE
Status: COMPLETED | OUTPATIENT
Start: 2019-11-13 | End: 2019-11-13

## 2019-11-13 RX ORDER — SODIUM CHLORIDE 0.9 % (FLUSH) 0.9 %
10 SYRINGE (ML) INJECTION PRN
Status: DISCONTINUED | OUTPATIENT
Start: 2019-11-13 | End: 2019-11-13 | Stop reason: HOSPADM

## 2019-11-13 RX ADMIN — PANTOPRAZOLE SODIUM 40 MG: 40 INJECTION, POWDER, FOR SOLUTION INTRAVENOUS at 11:10

## 2019-11-13 RX ADMIN — SODIUM CHLORIDE, POTASSIUM CHLORIDE, SODIUM LACTATE AND CALCIUM CHLORIDE: 600; 310; 30; 20 INJECTION, SOLUTION INTRAVENOUS at 09:38

## 2019-11-13 RX ADMIN — PROPOFOL 120 MG: 10 INJECTION, EMULSION INTRAVENOUS at 09:53

## 2019-11-13 RX ADMIN — SODIUM CHLORIDE, POTASSIUM CHLORIDE, SODIUM LACTATE AND CALCIUM CHLORIDE: 600; 310; 30; 20 INJECTION, SOLUTION INTRAVENOUS at 08:51

## 2019-11-13 ASSESSMENT — PAIN - FUNCTIONAL ASSESSMENT: PAIN_FUNCTIONAL_ASSESSMENT: 0-10

## 2020-07-23 LAB
VITAMIN D 25-HYDROXY: NORMAL
VITAMIN D2, 25 HYDROXY: NORMAL
VITAMIN D3,25 HYDROXY: NORMAL

## 2020-08-19 ENCOUNTER — TELEPHONE (OUTPATIENT)
Dept: ENT CLINIC | Age: 85
End: 2020-08-19

## 2020-08-19 NOTE — TELEPHONE ENCOUNTER
Pt's daughter called in to schedule an appt for the pt for epistaxis. He is bleeding pretty badly. She wanted him to be seen today or tomorrow. He is a prior pt. LOV: 12/14/2014. I did advised of going to the ED, since he is 89 they do not want to take him to the hospital because of covid-19. She did not want to schedule for first available. She would like the office to call her back today.

## 2020-09-21 LAB
HCT VFR BLD CALC: NORMAL %
HEMOGLOBIN: NORMAL
PLATELET # BLD: NORMAL 10*3/UL
WBC # BLD: NORMAL 10*3/UL

## 2021-01-01 ENCOUNTER — APPOINTMENT (OUTPATIENT)
Dept: GENERAL RADIOLOGY | Age: 86
DRG: 522 | End: 2021-01-01
Payer: MEDICARE

## 2021-01-01 ENCOUNTER — TELEPHONE (OUTPATIENT)
Dept: PRIMARY CARE CLINIC | Age: 86
End: 2021-01-01

## 2021-01-01 ENCOUNTER — OFFICE VISIT (OUTPATIENT)
Dept: SURGERY | Age: 86
End: 2021-01-01
Payer: MEDICARE

## 2021-01-01 ENCOUNTER — OFFICE VISIT (OUTPATIENT)
Dept: ORTHOPEDIC SURGERY | Age: 86
End: 2021-01-01
Payer: MEDICARE

## 2021-01-01 ENCOUNTER — HOSPITAL ENCOUNTER (INPATIENT)
Age: 86
LOS: 8 days | Discharge: NURSING, REHAB OR LONG-TERM CARE | DRG: 522 | End: 2021-10-21
Attending: EMERGENCY MEDICINE | Admitting: INTERNAL MEDICINE
Payer: MEDICARE

## 2021-01-01 ENCOUNTER — HOSPITAL ENCOUNTER (EMERGENCY)
Age: 86
Discharge: ANOTHER HOSPITAL | End: 2021-10-13
Payer: MEDICARE

## 2021-01-01 ENCOUNTER — ANESTHESIA EVENT (OUTPATIENT)
Dept: OPERATING ROOM | Age: 86
DRG: 522 | End: 2021-01-01
Payer: MEDICARE

## 2021-01-01 ENCOUNTER — TELEPHONE (OUTPATIENT)
Dept: SURGERY | Age: 86
End: 2021-01-01

## 2021-01-01 ENCOUNTER — ANESTHESIA (OUTPATIENT)
Dept: ENDOSCOPY | Age: 86
DRG: 522 | End: 2021-01-01
Payer: MEDICARE

## 2021-01-01 ENCOUNTER — HOSPITAL ENCOUNTER (OUTPATIENT)
Dept: GENERAL RADIOLOGY | Age: 86
Discharge: HOME | End: 2021-12-03
Payer: MEDICARE

## 2021-01-01 ENCOUNTER — HOSPITAL ENCOUNTER (OUTPATIENT)
Dept: WOUND CARE | Age: 86
Discharge: HOME | End: 2021-09-03
Payer: MEDICARE

## 2021-01-01 ENCOUNTER — HOSPITAL ENCOUNTER (EMERGENCY)
Age: 86
Discharge: HOME | End: 2021-12-06
Payer: MEDICARE

## 2021-01-01 ENCOUNTER — OFFICE VISIT (OUTPATIENT)
Dept: PRIMARY CARE CLINIC | Age: 86
End: 2021-01-01
Payer: MEDICARE

## 2021-01-01 ENCOUNTER — ANESTHESIA EVENT (OUTPATIENT)
Dept: ENDOSCOPY | Age: 86
DRG: 522 | End: 2021-01-01
Payer: MEDICARE

## 2021-01-01 ENCOUNTER — HOSPITAL ENCOUNTER (OUTPATIENT)
Dept: GENERAL RADIOLOGY | Age: 86
Discharge: HOME | End: 2021-11-07
Payer: MEDICARE

## 2021-01-01 ENCOUNTER — APPOINTMENT (OUTPATIENT)
Dept: CT IMAGING | Age: 86
DRG: 522 | End: 2021-01-01
Payer: MEDICARE

## 2021-01-01 ENCOUNTER — APPOINTMENT (OUTPATIENT)
Dept: GENERAL RADIOLOGY | Age: 86
End: 2021-01-01
Payer: MEDICARE

## 2021-01-01 ENCOUNTER — HOSPITAL ENCOUNTER (OUTPATIENT)
Dept: WOUND CARE | Age: 86
Discharge: HOME | End: 2021-09-17
Payer: MEDICARE

## 2021-01-01 ENCOUNTER — ANESTHESIA (OUTPATIENT)
Dept: OPERATING ROOM | Age: 86
DRG: 522 | End: 2021-01-01
Payer: MEDICARE

## 2021-01-01 ENCOUNTER — HOSPITAL ENCOUNTER (EMERGENCY)
Age: 86
Discharge: HOME | End: 2021-09-20
Attending: EMERGENCY MEDICINE
Payer: MEDICARE

## 2021-01-01 ENCOUNTER — HOSPITAL ENCOUNTER (OUTPATIENT)
Dept: GENERAL RADIOLOGY | Age: 86
Discharge: HOME | End: 2021-11-03
Payer: MEDICARE

## 2021-01-01 VITALS
HEIGHT: 69 IN | HEART RATE: 78 BPM | OXYGEN SATURATION: 99 % | SYSTOLIC BLOOD PRESSURE: 126 MMHG | RESPIRATION RATE: 16 BRPM | WEIGHT: 145.06 LBS | TEMPERATURE: 97.1 F | BODY MASS INDEX: 21.49 KG/M2 | DIASTOLIC BLOOD PRESSURE: 61 MMHG

## 2021-01-01 VITALS
HEIGHT: 69 IN | RESPIRATION RATE: 16 BRPM | TEMPERATURE: 97.1 F | DIASTOLIC BLOOD PRESSURE: 70 MMHG | WEIGHT: 135 LBS | HEART RATE: 66 BPM | SYSTOLIC BLOOD PRESSURE: 100 MMHG | BODY MASS INDEX: 19.99 KG/M2

## 2021-01-01 VITALS
OXYGEN SATURATION: 100 % | SYSTOLIC BLOOD PRESSURE: 78 MMHG | DIASTOLIC BLOOD PRESSURE: 59 MMHG | RESPIRATION RATE: 16 BRPM

## 2021-01-01 VITALS
HEIGHT: 68 IN | SYSTOLIC BLOOD PRESSURE: 146 MMHG | DIASTOLIC BLOOD PRESSURE: 89 MMHG | RESPIRATION RATE: 20 BRPM | BODY MASS INDEX: 19.85 KG/M2 | TEMPERATURE: 97.2 F | OXYGEN SATURATION: 93 % | WEIGHT: 131 LBS | HEART RATE: 85 BPM

## 2021-01-01 VITALS
TEMPERATURE: 97.7 F | DIASTOLIC BLOOD PRESSURE: 59 MMHG | HEIGHT: 69 IN | WEIGHT: 135 LBS | BODY MASS INDEX: 19.99 KG/M2 | OXYGEN SATURATION: 95 % | HEART RATE: 65 BPM | SYSTOLIC BLOOD PRESSURE: 119 MMHG

## 2021-01-01 VITALS
TEMPERATURE: 97 F | HEART RATE: 91 BPM | BODY MASS INDEX: 21.42 KG/M2 | SYSTOLIC BLOOD PRESSURE: 111 MMHG | DIASTOLIC BLOOD PRESSURE: 62 MMHG | HEIGHT: 69 IN

## 2021-01-01 VITALS — OXYGEN SATURATION: 97 % | DIASTOLIC BLOOD PRESSURE: 72 MMHG | SYSTOLIC BLOOD PRESSURE: 161 MMHG

## 2021-01-01 VITALS
WEIGHT: 135 LBS | HEIGHT: 67 IN | DIASTOLIC BLOOD PRESSURE: 72 MMHG | RESPIRATION RATE: 18 BRPM | OXYGEN SATURATION: 92 % | TEMPERATURE: 97.1 F | HEART RATE: 80 BPM | SYSTOLIC BLOOD PRESSURE: 108 MMHG | BODY MASS INDEX: 21.19 KG/M2

## 2021-01-01 VITALS
DIASTOLIC BLOOD PRESSURE: 63 MMHG | OXYGEN SATURATION: 92 % | TEMPERATURE: 97.3 F | HEART RATE: 64 BPM | SYSTOLIC BLOOD PRESSURE: 165 MMHG | BODY MASS INDEX: 21.41 KG/M2 | WEIGHT: 145 LBS | RESPIRATION RATE: 20 BRPM

## 2021-01-01 VITALS — TEMPERATURE: 97.6 F

## 2021-01-01 DIAGNOSIS — S72.002A CLOSED LEFT HIP FRACTURE, INITIAL ENCOUNTER (HCC): Primary | ICD-10-CM

## 2021-01-01 DIAGNOSIS — S72.002A CLOSED LEFT HIP FRACTURE, INITIAL ENCOUNTER (HCC): ICD-10-CM

## 2021-01-01 DIAGNOSIS — E44.0 PROTEIN-CALORIE MALNUTRITION, MODERATE (HCC): ICD-10-CM

## 2021-01-01 DIAGNOSIS — Z96.642 HISTORY OF LEFT HIP HEMIARTHROPLASTY: ICD-10-CM

## 2021-01-01 DIAGNOSIS — Z96.642 HISTORY OF LEFT HIP HEMIARTHROPLASTY: Primary | ICD-10-CM

## 2021-01-01 DIAGNOSIS — D69.6 THROMBOCYTOPENIA (HCC): ICD-10-CM

## 2021-01-01 DIAGNOSIS — R25.1 TREMOR: Primary | ICD-10-CM

## 2021-01-01 DIAGNOSIS — R39.9 URINARY TRACT INFECTION SYMPTOMS: Primary | ICD-10-CM

## 2021-01-01 DIAGNOSIS — Z93.1 S/P PERCUTANEOUS ENDOSCOPIC GASTROSTOMY (PEG) TUBE PLACEMENT (HCC): Primary | ICD-10-CM

## 2021-01-01 DIAGNOSIS — T14.8XXA MULTIPLE SKIN TEARS: Primary | ICD-10-CM

## 2021-01-01 DIAGNOSIS — S72.009A CLOSED FRACTURE OF HIP, UNSPECIFIED LATERALITY, INITIAL ENCOUNTER (HCC): ICD-10-CM

## 2021-01-01 DIAGNOSIS — S72.002A CLOSED FRACTURE OF LEFT HIP, INITIAL ENCOUNTER (HCC): ICD-10-CM

## 2021-01-01 DIAGNOSIS — G20 PARKINSONISM, UNSPECIFIED PARKINSONISM TYPE (HCC): ICD-10-CM

## 2021-01-01 DIAGNOSIS — Z91.81 AT HIGH RISK FOR FALLS: ICD-10-CM

## 2021-01-01 DIAGNOSIS — L25.9 CONTACT DERMATITIS, UNSPECIFIED CONTACT DERMATITIS TYPE, UNSPECIFIED TRIGGER: Primary | ICD-10-CM

## 2021-01-01 DIAGNOSIS — R13.12 OROPHARYNGEAL DYSPHAGIA: ICD-10-CM
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
## 2021-01-01 LAB
ABO/RH: NORMAL
ABO/RH: NORMAL
ALBUMIN SERPL-MCNC: 4.1 G/DL (ref 3.5–5.2)
ALP BLD-CCNC: 126 U/L (ref 40–129)
ALT SERPL-CCNC: 13 U/L (ref 0–40)
ANION GAP SERPL CALCULATED.3IONS-SCNC: 10 MMOL/L (ref 7–16)
ANION GAP SERPL CALCULATED.3IONS-SCNC: 12 MMOL/L (ref 7–16)
ANION GAP SERPL CALCULATED.3IONS-SCNC: 14 MMOL/L (ref 7–16)
ANION GAP SERPL CALCULATED.3IONS-SCNC: 6 MMOL/L (ref 7–16)
ANION GAP SERPL CALCULATED.3IONS-SCNC: 9 MMOL/L (ref 7–16)
ANISOCYTOSIS: ABNORMAL
ANTIBODY SCREEN: NORMAL
ANTIBODY SCREEN: NORMAL
APTT: 28.2 SEC (ref 24.5–35.1)
APTT: 28.6 SEC (ref 24.5–35.1)
AST SERPL-CCNC: 27 U/L (ref 0–39)
B.E.: -0.7 MMOL/L (ref -3–3)
B.E.: -2.3 MMOL/L (ref -3–3)
BASOPHILS ABSOLUTE: 0 E9/L (ref 0–0.2)
BASOPHILS ABSOLUTE: 0 E9/L (ref 0–0.2)
BASOPHILS ABSOLUTE: 0.01 E9/L (ref 0–0.2)
BASOPHILS ABSOLUTE: 0.02 E9/L (ref 0–0.2)
BASOPHILS ABSOLUTE: 0.05 E9/L (ref 0–0.2)
BASOPHILS RELATIVE PERCENT: 0.1 % (ref 0–2)
BASOPHILS RELATIVE PERCENT: 0.1 % (ref 0–2)
BASOPHILS RELATIVE PERCENT: 0.3 % (ref 0–2)
BASOPHILS RELATIVE PERCENT: 0.4 % (ref 0–2)
BASOPHILS RELATIVE PERCENT: 0.5 % (ref 0–2)
BASOPHILS RELATIVE PERCENT: 0.9 % (ref 0–2)
BILIRUB SERPL-MCNC: 0.4 MG/DL (ref 0–1.2)
BLOOD BANK DISPENSE STATUS: NORMAL
BLOOD BANK DISPENSE STATUS: NORMAL
BLOOD BANK PRODUCT CODE: NORMAL
BLOOD BANK PRODUCT CODE: NORMAL
BPU ID: NORMAL
BPU ID: NORMAL
BUN BLDV-MCNC: 13 MG/DL (ref 6–23)
BUN BLDV-MCNC: 17 MG/DL (ref 6–23)
BUN BLDV-MCNC: 19 MG/DL (ref 6–23)
BUN BLDV-MCNC: 21 MG/DL (ref 6–23)
BUN BLDV-MCNC: 23 MG/DL (ref 6–23)
BUN BLDV-MCNC: 24 MG/DL (ref 6–23)
BUN BLDV-MCNC: 28 MG/DL (ref 6–23)
BUN BLDV-MCNC: 28 MG/DL (ref 6–23)
BUN BLDV-MCNC: 33 MG/DL (ref 6–23)
BUN BLDV-MCNC: 45 MG/DL (ref 6–23)
CALCIUM IONIZED: 1.12 MMOL/L (ref 1.15–1.33)
CALCIUM SERPL-MCNC: 6.1 MG/DL (ref 8.6–10.2)
CALCIUM SERPL-MCNC: 7.7 MG/DL (ref 8.6–10.2)
CALCIUM SERPL-MCNC: 7.7 MG/DL (ref 8.6–10.2)
CALCIUM SERPL-MCNC: 7.8 MG/DL (ref 8.6–10.2)
CALCIUM SERPL-MCNC: 7.9 MG/DL (ref 8.6–10.2)
CALCIUM SERPL-MCNC: 8 MG/DL (ref 8.6–10.2)
CALCIUM SERPL-MCNC: 8 MG/DL (ref 8.6–10.2)
CALCIUM SERPL-MCNC: 8.2 MG/DL (ref 8.6–10.2)
CALCIUM SERPL-MCNC: 8.6 MG/DL (ref 8.6–10.2)
CALCIUM SERPL-MCNC: 8.6 MG/DL (ref 8.6–10.2)
CHLORIDE BLD-SCNC: 100 MMOL/L (ref 98–107)
CHLORIDE BLD-SCNC: 101 MMOL/L (ref 98–107)
CHLORIDE BLD-SCNC: 101 MMOL/L (ref 98–107)
CHLORIDE BLD-SCNC: 103 MMOL/L (ref 98–107)
CHLORIDE BLD-SCNC: 103 MMOL/L (ref 98–107)
CHLORIDE BLD-SCNC: 104 MMOL/L (ref 98–107)
CHLORIDE BLD-SCNC: 106 MMOL/L (ref 98–107)
CHLORIDE BLD-SCNC: 107 MMOL/L (ref 98–107)
CHLORIDE BLD-SCNC: 109 MMOL/L (ref 98–107)
CHLORIDE BLD-SCNC: 112 MMOL/L (ref 98–107)
CO2: 17 MMOL/L (ref 22–29)
CO2: 18 MMOL/L (ref 22–29)
CO2: 19 MMOL/L (ref 22–29)
CO2: 19 MMOL/L (ref 22–29)
CO2: 20 MMOL/L (ref 22–29)
CO2: 20 MMOL/L (ref 22–29)
CO2: 21 MMOL/L (ref 22–29)
CO2: 22 MMOL/L (ref 22–29)
CO2: 22 MMOL/L (ref 22–29)
CO2: 24 MMOL/L (ref 22–29)
COHB: 0.3 % (ref 0–1.5)
COHB: 0.5 % (ref 0–1.5)
CREAT SERPL-MCNC: 0.8 MG/DL (ref 0.7–1.2)
CREAT SERPL-MCNC: 0.9 MG/DL (ref 0.7–1.2)
CREAT SERPL-MCNC: 0.9 MG/DL (ref 0.7–1.2)
CREAT SERPL-MCNC: 1.1 MG/DL (ref 0.7–1.2)
CREAT SERPL-MCNC: 1.2 MG/DL (ref 0.7–1.2)
CREAT SERPL-MCNC: 1.3 MG/DL (ref 0.7–1.2)
CRITICAL: ABNORMAL
CRITICAL: ABNORMAL
DATE ANALYZED: ABNORMAL
DATE ANALYZED: ABNORMAL
DATE OF COLLECTION: ABNORMAL
DATE OF COLLECTION: ABNORMAL
DESCRIPTION BLOOD BANK: NORMAL
DESCRIPTION BLOOD BANK: NORMAL
EKG ATRIAL RATE: 89 BPM
EKG P AXIS: 97 DEGREES
EKG P-R INTERVAL: 172 MS
EKG Q-T INTERVAL: 374 MS
EKG QRS DURATION: 138 MS
EKG QTC CALCULATION (BAZETT): 455 MS
EKG R AXIS: 65 DEGREES
EKG T AXIS: 51 DEGREES
EKG VENTRICULAR RATE: 89 BPM
EOSINOPHILS ABSOLUTE: 0.03 E9/L (ref 0.05–0.5)
EOSINOPHILS ABSOLUTE: 0.06 E9/L (ref 0.05–0.5)
EOSINOPHILS ABSOLUTE: 0.12 E9/L (ref 0.05–0.5)
EOSINOPHILS ABSOLUTE: 0.14 E9/L (ref 0.05–0.5)
EOSINOPHILS ABSOLUTE: 0.16 E9/L (ref 0.05–0.5)
EOSINOPHILS ABSOLUTE: 0.17 E9/L (ref 0.05–0.5)
EOSINOPHILS ABSOLUTE: 0.18 E9/L (ref 0.05–0.5)
EOSINOPHILS ABSOLUTE: 0.2 E9/L (ref 0.05–0.5)
EOSINOPHILS RELATIVE PERCENT: 0.3 % (ref 0–6)
EOSINOPHILS RELATIVE PERCENT: 0.9 % (ref 0–6)
EOSINOPHILS RELATIVE PERCENT: 1.7 % (ref 0–6)
EOSINOPHILS RELATIVE PERCENT: 2.6 % (ref 0–6)
EOSINOPHILS RELATIVE PERCENT: 3.3 % (ref 0–6)
EOSINOPHILS RELATIVE PERCENT: 3.7 % (ref 0–6)
EOSINOPHILS RELATIVE PERCENT: 3.8 % (ref 0–6)
EOSINOPHILS RELATIVE PERCENT: 3.9 % (ref 0–6)
FERRITIN: 45 NG/ML
GFR AFRICAN AMERICAN: >60
GFR NON-AFRICAN AMERICAN: 51 ML/MIN/1.73
GFR NON-AFRICAN AMERICAN: 56 ML/MIN/1.73
GFR NON-AFRICAN AMERICAN: >60 ML/MIN/1.73
GLUCOSE BLD-MCNC: 101 MG/DL (ref 74–99)
GLUCOSE BLD-MCNC: 103 MG/DL (ref 74–99)
GLUCOSE BLD-MCNC: 117 MG/DL (ref 74–99)
GLUCOSE BLD-MCNC: 117 MG/DL (ref 74–99)
GLUCOSE BLD-MCNC: 129 MG/DL (ref 74–99)
GLUCOSE BLD-MCNC: 131 MG/DL (ref 74–99)
GLUCOSE BLD-MCNC: 139 MG/DL (ref 74–99)
GLUCOSE BLD-MCNC: 79 MG/DL (ref 74–99)
GLUCOSE BLD-MCNC: 94 MG/DL (ref 74–99)
GLUCOSE BLD-MCNC: 97 MG/DL (ref 74–99)
HCO3: 21.2 MMOL/L (ref 22–26)
HCO3: 23.2 MMOL/L (ref 22–26)
HCT VFR BLD CALC: 20.7 % (ref 37–54)
HCT VFR BLD CALC: 21.8 % (ref 37–54)
HCT VFR BLD CALC: 23 % (ref 37–54)
HCT VFR BLD CALC: 23.5 % (ref 37–54)
HCT VFR BLD CALC: 23.5 % (ref 37–54)
HCT VFR BLD CALC: 24.4 % (ref 37–54)
HCT VFR BLD CALC: 24.6 % (ref 37–54)
HCT VFR BLD CALC: 24.8 % (ref 37–54)
HCT VFR BLD CALC: 25.8 % (ref 37–54)
HCT VFR BLD CALC: 26.8 % (ref 37–54)
HEMOGLOBIN: 6.6 G/DL (ref 12.5–16.5)
HEMOGLOBIN: 7.1 G/DL (ref 12.5–16.5)
HEMOGLOBIN: 7.5 G/DL (ref 12.5–16.5)
HEMOGLOBIN: 7.6 G/DL (ref 12.5–16.5)
HEMOGLOBIN: 7.7 G/DL (ref 12.5–16.5)
HEMOGLOBIN: 7.8 G/DL (ref 12.5–16.5)
HEMOGLOBIN: 8 G/DL (ref 12.5–16.5)
HEMOGLOBIN: 8.2 G/DL (ref 12.5–16.5)
HEMOGLOBIN: 8.2 G/DL (ref 12.5–16.5)
HEMOGLOBIN: 8.5 G/DL (ref 12.5–16.5)
HHB: 12.4 % (ref 0–5)
HHB: 19 % (ref 0–5)
IMMATURE GRANULOCYTES #: 0.04 E9/L
IMMATURE GRANULOCYTES #: 0.06 E9/L
IMMATURE GRANULOCYTES #: 0.07 E9/L
IMMATURE GRANULOCYTES #: 0.09 E9/L
IMMATURE GRANULOCYTES #: 0.14 E9/L
IMMATURE GRANULOCYTES %: 0.9 % (ref 0–5)
IMMATURE GRANULOCYTES %: 1.2 % (ref 0–5)
IMMATURE GRANULOCYTES %: 1.3 % (ref 0–5)
IMMATURE GRANULOCYTES %: 1.4 % (ref 0–5)
IMMATURE GRANULOCYTES %: 2 % (ref 0–5)
INR BLD: 1.1
IRON SATURATION: 29 % (ref 20–55)
IRON: 52 MCG/DL (ref 59–158)
LAB: ABNORMAL
LAB: ABNORMAL
LYMPHOCYTES ABSOLUTE: 0.59 E9/L (ref 1.5–4)
LYMPHOCYTES ABSOLUTE: 0.64 E9/L (ref 1.5–4)
LYMPHOCYTES ABSOLUTE: 0.68 E9/L (ref 1.5–4)
LYMPHOCYTES ABSOLUTE: 0.72 E9/L (ref 1.5–4)
LYMPHOCYTES ABSOLUTE: 0.75 E9/L (ref 1.5–4)
LYMPHOCYTES ABSOLUTE: 0.83 E9/L (ref 1.5–4)
LYMPHOCYTES ABSOLUTE: 0.88 E9/L (ref 1.5–4)
LYMPHOCYTES ABSOLUTE: 1.38 E9/L (ref 1.5–4)
LYMPHOCYTES RELATIVE PERCENT: 10.4 % (ref 20–42)
LYMPHOCYTES RELATIVE PERCENT: 11.4 % (ref 20–42)
LYMPHOCYTES RELATIVE PERCENT: 14.1 % (ref 20–42)
LYMPHOCYTES RELATIVE PERCENT: 16.3 % (ref 20–42)
LYMPHOCYTES RELATIVE PERCENT: 16.5 % (ref 20–42)
LYMPHOCYTES RELATIVE PERCENT: 18.8 % (ref 20–42)
LYMPHOCYTES RELATIVE PERCENT: 20 % (ref 20–42)
LYMPHOCYTES RELATIVE PERCENT: 6.2 % (ref 20–42)
Lab: ABNORMAL
Lab: ABNORMAL
MAGNESIUM: 1.6 MG/DL (ref 1.6–2.6)
MAGNESIUM: 2 MG/DL (ref 1.6–2.6)
MAGNESIUM: 2.1 MG/DL (ref 1.6–2.6)
MCH RBC QN AUTO: 30.2 PG (ref 26–35)
MCH RBC QN AUTO: 30.4 PG (ref 26–35)
MCH RBC QN AUTO: 30.4 PG (ref 26–35)
MCH RBC QN AUTO: 30.5 PG (ref 26–35)
MCH RBC QN AUTO: 30.5 PG (ref 26–35)
MCH RBC QN AUTO: 30.7 PG (ref 26–35)
MCH RBC QN AUTO: 30.7 PG (ref 26–35)
MCH RBC QN AUTO: 30.9 PG (ref 26–35)
MCH RBC QN AUTO: 31.7 PG (ref 26–35)
MCHC RBC AUTO-ENTMCNC: 31.3 % (ref 32–34.5)
MCHC RBC AUTO-ENTMCNC: 31.7 % (ref 32–34.5)
MCHC RBC AUTO-ENTMCNC: 31.8 % (ref 32–34.5)
MCHC RBC AUTO-ENTMCNC: 31.9 % (ref 32–34.5)
MCHC RBC AUTO-ENTMCNC: 32.3 % (ref 32–34.5)
MCHC RBC AUTO-ENTMCNC: 32.6 % (ref 32–34.5)
MCHC RBC AUTO-ENTMCNC: 32.6 % (ref 32–34.5)
MCHC RBC AUTO-ENTMCNC: 32.8 % (ref 32–34.5)
MCHC RBC AUTO-ENTMCNC: 33.2 % (ref 32–34.5)
MCV RBC AUTO: 93.1 FL (ref 80–99.9)
MCV RBC AUTO: 93.5 FL (ref 80–99.9)
MCV RBC AUTO: 94 FL (ref 80–99.9)
MCV RBC AUTO: 94.8 FL (ref 80–99.9)
MCV RBC AUTO: 95.5 FL (ref 80–99.9)
MCV RBC AUTO: 95.6 FL (ref 80–99.9)
MCV RBC AUTO: 96.3 FL (ref 80–99.9)
MCV RBC AUTO: 96.5 FL (ref 80–99.9)
MCV RBC AUTO: 96.8 FL (ref 80–99.9)
METER GLUCOSE: 110 MG/DL (ref 74–99)
METER GLUCOSE: 120 MG/DL (ref 74–99)
METHB: 0.3 % (ref 0–1.5)
METHB: 0.7 % (ref 0–1.5)
MODE: ABNORMAL
MODE: ABNORMAL
MONOCYTES ABSOLUTE: 0.28 E9/L (ref 0.1–0.95)
MONOCYTES ABSOLUTE: 0.38 E9/L (ref 0.1–0.95)
MONOCYTES ABSOLUTE: 0.43 E9/L (ref 0.1–0.95)
MONOCYTES ABSOLUTE: 0.43 E9/L (ref 0.1–0.95)
MONOCYTES ABSOLUTE: 0.46 E9/L (ref 0.1–0.95)
MONOCYTES ABSOLUTE: 0.49 E9/L (ref 0.1–0.95)
MONOCYTES ABSOLUTE: 0.53 E9/L (ref 0.1–0.95)
MONOCYTES ABSOLUTE: 0.62 E9/L (ref 0.1–0.95)
MONOCYTES RELATIVE PERCENT: 10 % (ref 2–12)
MONOCYTES RELATIVE PERCENT: 11.6 % (ref 2–12)
MONOCYTES RELATIVE PERCENT: 12 % (ref 2–12)
MONOCYTES RELATIVE PERCENT: 3.5 % (ref 2–12)
MONOCYTES RELATIVE PERCENT: 3.7 % (ref 2–12)
MONOCYTES RELATIVE PERCENT: 6.1 % (ref 2–12)
MONOCYTES RELATIVE PERCENT: 8.8 % (ref 2–12)
MONOCYTES RELATIVE PERCENT: 8.9 % (ref 2–12)
NEUTROPHILS ABSOLUTE: 2.78 E9/L (ref 1.8–7.3)
NEUTROPHILS ABSOLUTE: 3.16 E9/L (ref 1.8–7.3)
NEUTROPHILS ABSOLUTE: 3.48 E9/L (ref 1.8–7.3)
NEUTROPHILS ABSOLUTE: 3.55 E9/L (ref 1.8–7.3)
NEUTROPHILS ABSOLUTE: 4.16 E9/L (ref 1.8–7.3)
NEUTROPHILS ABSOLUTE: 5.18 E9/L (ref 1.8–7.3)
NEUTROPHILS ABSOLUTE: 5.98 E9/L (ref 1.8–7.3)
NEUTROPHILS ABSOLUTE: 9.08 E9/L (ref 1.8–7.3)
NEUTROPHILS RELATIVE PERCENT: 62.9 % (ref 43–80)
NEUTROPHILS RELATIVE PERCENT: 66.5 % (ref 43–80)
NEUTROPHILS RELATIVE PERCENT: 68.5 % (ref 43–80)
NEUTROPHILS RELATIVE PERCENT: 72.1 % (ref 43–80)
NEUTROPHILS RELATIVE PERCENT: 74.8 % (ref 43–80)
NEUTROPHILS RELATIVE PERCENT: 77.2 % (ref 43–80)
NEUTROPHILS RELATIVE PERCENT: 82.6 % (ref 43–80)
NEUTROPHILS RELATIVE PERCENT: 88.3 % (ref 43–80)
O2 CONTENT: 10.9 ML/DL
O2 CONTENT: 11 ML/DL
O2 SATURATION: 80.8 % (ref 92–98.5)
O2 SATURATION: 87.5 % (ref 92–98.5)
O2HB: 80 % (ref 94–97)
O2HB: 86.8 % (ref 94–97)
OPERATOR ID: 1926
OPERATOR ID: 914
OVALOCYTES: ABNORMAL
OVALOCYTES: ABNORMAL
PATIENT TEMP: 37 C
PATIENT TEMP: 37 C
PCO2: 31.7 MMHG (ref 35–45)
PCO2: 35.5 MMHG (ref 35–45)
PDW BLD-RTO: 14.4 FL (ref 11.5–15)
PDW BLD-RTO: 14.5 FL (ref 11.5–15)
PDW BLD-RTO: 15 FL (ref 11.5–15)
PDW BLD-RTO: 15.1 FL (ref 11.5–15)
PDW BLD-RTO: 15.2 FL (ref 11.5–15)
PDW BLD-RTO: 15.4 FL (ref 11.5–15)
PDW BLD-RTO: 15.6 FL (ref 11.5–15)
PDW BLD-RTO: 15.6 FL (ref 11.5–15)
PDW BLD-RTO: 15.9 FL (ref 11.5–15)
PH BLOOD GAS: 7.43 (ref 7.35–7.45)
PH BLOOD GAS: 7.44 (ref 7.35–7.45)
PHOSPHORUS: 2.7 MG/DL (ref 2.5–4.5)
PHOSPHORUS: 3.2 MG/DL (ref 2.5–4.5)
PLATELET # BLD: 102 E9/L (ref 130–450)
PLATELET # BLD: 117 E9/L (ref 130–450)
PLATELET # BLD: 141 E9/L (ref 130–450)
PLATELET # BLD: 145 E9/L (ref 130–450)
PLATELET # BLD: 160 E9/L (ref 130–450)
PLATELET # BLD: 73 E9/L (ref 130–450)
PLATELET # BLD: 83 E9/L (ref 130–450)
PLATELET # BLD: 88 E9/L (ref 130–450)
PLATELET # BLD: 94 E9/L (ref 130–450)
PLATELET CONFIRMATION: NORMAL
PMV BLD AUTO: 11 FL (ref 7–12)
PMV BLD AUTO: 11 FL (ref 7–12)
PMV BLD AUTO: 11.2 FL (ref 7–12)
PMV BLD AUTO: 11.3 FL (ref 7–12)
PMV BLD AUTO: 11.3 FL (ref 7–12)
PMV BLD AUTO: 11.4 FL (ref 7–12)
PMV BLD AUTO: 11.5 FL (ref 7–12)
PMV BLD AUTO: 11.6 FL (ref 7–12)
PMV BLD AUTO: 11.7 FL (ref 7–12)
PO2: 43.4 MMHG (ref 75–100)
PO2: 52.4 MMHG (ref 75–100)
POIKILOCYTES: ABNORMAL
POIKILOCYTES: ABNORMAL
POLYCHROMASIA: ABNORMAL
POTASSIUM REFLEX MAGNESIUM: 3 MMOL/L (ref 3.5–5)
POTASSIUM REFLEX MAGNESIUM: 4.8 MMOL/L (ref 3.5–5)
POTASSIUM SERPL-SCNC: 3.4 MMOL/L (ref 3.5–5)
POTASSIUM SERPL-SCNC: 3.6 MMOL/L (ref 3.5–5)
POTASSIUM SERPL-SCNC: 3.8 MMOL/L (ref 3.5–5)
POTASSIUM SERPL-SCNC: 3.8 MMOL/L (ref 3.5–5)
POTASSIUM SERPL-SCNC: 4 MMOL/L (ref 3.5–5)
POTASSIUM SERPL-SCNC: 4.3 MMOL/L (ref 3.5–5)
POTASSIUM SERPL-SCNC: 4.4 MMOL/L (ref 3.5–5)
POTASSIUM SERPL-SCNC: 5 MMOL/L (ref 3.5–5)
PRO-BNP: 2508 PG/ML (ref 0–450)
PRO-BNP: 2915 PG/ML (ref 0–450)
PRO-BNP: 424 PG/ML (ref 0–450)
PROCALCITONIN: 0.13 NG/ML (ref 0–0.08)
PROTHROMBIN TIME: 12.1 SEC (ref 9.3–12.4)
RBC # BLD: 2.15 E12/L (ref 3.8–5.8)
RBC # BLD: 2.3 E12/L (ref 3.8–5.8)
RBC # BLD: 2.46 E12/L (ref 3.8–5.8)
RBC # BLD: 2.46 E12/L (ref 3.8–5.8)
RBC # BLD: 2.5 E12/L (ref 3.8–5.8)
RBC # BLD: 2.55 E12/L (ref 3.8–5.8)
RBC # BLD: 2.62 E12/L (ref 3.8–5.8)
RBC # BLD: 2.7 E12/L (ref 3.8–5.8)
RBC # BLD: 2.77 E12/L (ref 3.8–5.8)
SARS-COV-2, NAAT: NOT DETECTED
SODIUM BLD-SCNC: 133 MMOL/L (ref 132–146)
SODIUM BLD-SCNC: 133 MMOL/L (ref 132–146)
SODIUM BLD-SCNC: 134 MMOL/L (ref 132–146)
SODIUM BLD-SCNC: 134 MMOL/L (ref 132–146)
SODIUM BLD-SCNC: 135 MMOL/L (ref 132–146)
SODIUM BLD-SCNC: 135 MMOL/L (ref 132–146)
SODIUM BLD-SCNC: 136 MMOL/L (ref 132–146)
SODIUM BLD-SCNC: 137 MMOL/L (ref 132–146)
SODIUM BLD-SCNC: 138 MMOL/L (ref 132–146)
SODIUM BLD-SCNC: 140 MMOL/L (ref 132–146)
SOURCE, BLOOD GAS: ABNORMAL
SOURCE, BLOOD GAS: ABNORMAL
THB: 9 G/DL (ref 11.5–16.5)
THB: 9.7 G/DL (ref 11.5–16.5)
TIME ANALYZED: 1430
TIME ANALYZED: 2329
TOTAL IRON BINDING CAPACITY: 179 MCG/DL (ref 250–450)
TOTAL PROTEIN: 7.9 G/DL (ref 6.4–8.3)
TRIGL SERPL-MCNC: 98 MG/DL (ref 0–149)
TROPONIN, HIGH SENSITIVITY: 15 NG/L (ref 0–11)
VITAMIN D 25-HYDROXY: 20 NG/ML (ref 30–100)
WBC # BLD: 10.3 E9/L (ref 4.5–11.5)
WBC # BLD: 4.4 E9/L (ref 4.5–11.5)
WBC # BLD: 4.6 E9/L (ref 4.5–11.5)
WBC # BLD: 4.8 E9/L (ref 4.5–11.5)
WBC # BLD: 5.3 E9/L (ref 4.5–11.5)
WBC # BLD: 5.4 E9/L (ref 4.5–11.5)
WBC # BLD: 6.9 E9/L (ref 4.5–11.5)
WBC # BLD: 7.2 E9/L (ref 4.5–11.5)
WBC # BLD: 8.5 E9/L (ref 4.5–11.5)

## 2021-01-01 PROCEDURE — 99024 POSTOP FOLLOW-UP VISIT: CPT | Performed by: PHYSICIAN ASSISTANT

## 2021-01-01 PROCEDURE — 83880 ASSAY OF NATRIURETIC PEPTIDE: CPT

## 2021-01-01 PROCEDURE — 2580000003 HC RX 258: Performed by: STUDENT IN AN ORGANIZED HEALTH CARE EDUCATION/TRAINING PROGRAM

## 2021-01-01 PROCEDURE — 6360000002 HC RX W HCPCS: Performed by: STUDENT IN AN ORGANIZED HEALTH CARE EDUCATION/TRAINING PROGRAM

## 2021-01-01 PROCEDURE — 99203 OFFICE O/P NEW LOW 30 MIN: CPT | Performed by: FAMILY MEDICINE

## 2021-01-01 PROCEDURE — 92611 MOTION FLUOROSCOPY/SWALLOW: CPT | Performed by: SPEECH-LANGUAGE PATHOLOGIST

## 2021-01-01 PROCEDURE — 97162 PT EVAL MOD COMPLEX 30 MIN: CPT | Performed by: PHYSICAL THERAPIST

## 2021-01-01 PROCEDURE — 3700000000 HC ANESTHESIA ATTENDED CARE: Performed by: ORTHOPAEDIC SURGERY

## 2021-01-01 PROCEDURE — 84100 ASSAY OF PHOSPHORUS: CPT

## 2021-01-01 PROCEDURE — 88311 DECALCIFY TISSUE: CPT

## 2021-01-01 PROCEDURE — 86850 RBC ANTIBODY SCREEN: CPT

## 2021-01-01 PROCEDURE — 97140 MANUAL THERAPY 1/> REGIONS: CPT

## 2021-01-01 PROCEDURE — 71045 X-RAY EXAM CHEST 1 VIEW: CPT

## 2021-01-01 PROCEDURE — 97530 THERAPEUTIC ACTIVITIES: CPT

## 2021-01-01 PROCEDURE — 6360000002 HC RX W HCPCS: Performed by: INTERNAL MEDICINE

## 2021-01-01 PROCEDURE — 6360000002 HC RX W HCPCS: Performed by: NURSE PRACTITIONER

## 2021-01-01 PROCEDURE — 97535 SELF CARE MNGMENT TRAINING: CPT

## 2021-01-01 PROCEDURE — 6370000000 HC RX 637 (ALT 250 FOR IP): Performed by: STUDENT IN AN ORGANIZED HEALTH CARE EDUCATION/TRAINING PROGRAM

## 2021-01-01 PROCEDURE — 3600000005 HC SURGERY LEVEL 5 BASE: Performed by: ORTHOPAEDIC SURGERY

## 2021-01-01 PROCEDURE — 83550 IRON BINDING TEST: CPT

## 2021-01-01 PROCEDURE — 2700000000 HC OXYGEN THERAPY PER DAY

## 2021-01-01 PROCEDURE — 72170 X-RAY EXAM OF PELVIS: CPT

## 2021-01-01 PROCEDURE — 2580000003 HC RX 258: Performed by: EMERGENCY MEDICINE

## 2021-01-01 PROCEDURE — 2500000003 HC RX 250 WO HCPCS

## 2021-01-01 PROCEDURE — 2500000003 HC RX 250 WO HCPCS: Performed by: INTERNAL MEDICINE

## 2021-01-01 PROCEDURE — 36415 COLL VENOUS BLD VENIPUNCTURE: CPT

## 2021-01-01 PROCEDURE — 6370000000 HC RX 637 (ALT 250 FOR IP): Performed by: NURSE PRACTITIONER

## 2021-01-01 PROCEDURE — 85027 COMPLETE CBC AUTOMATED: CPT

## 2021-01-01 PROCEDURE — 70450 CT HEAD/BRAIN W/O DYE: CPT

## 2021-01-01 PROCEDURE — 71275 CT ANGIOGRAPHY CHEST: CPT

## 2021-01-01 PROCEDURE — 27236 TREAT THIGH FRACTURE: CPT | Performed by: ORTHOPAEDIC SURGERY

## 2021-01-01 PROCEDURE — 93005 ELECTROCARDIOGRAM TRACING: CPT | Performed by: STUDENT IN AN ORGANIZED HEALTH CARE EDUCATION/TRAINING PROGRAM

## 2021-01-01 PROCEDURE — 85014 HEMATOCRIT: CPT

## 2021-01-01 PROCEDURE — 36430 TRANSFUSION BLD/BLD COMPNT: CPT

## 2021-01-01 PROCEDURE — 92526 ORAL FUNCTION THERAPY: CPT

## 2021-01-01 PROCEDURE — 80048 BASIC METABOLIC PNL TOTAL CA: CPT

## 2021-01-01 PROCEDURE — 92611 MOTION FLUOROSCOPY/SWALLOW: CPT

## 2021-01-01 PROCEDURE — 3700000001 HC ADD 15 MINUTES (ANESTHESIA): Performed by: ORTHOPAEDIC SURGERY

## 2021-01-01 PROCEDURE — 99212 OFFICE O/P EST SF 10 MIN: CPT

## 2021-01-01 PROCEDURE — C1776 JOINT DEVICE (IMPLANTABLE): HCPCS | Performed by: ORTHOPAEDIC SURGERY

## 2021-01-01 PROCEDURE — 2580000003 HC RX 258: Performed by: INTERNAL MEDICINE

## 2021-01-01 PROCEDURE — 73502 X-RAY EXAM HIP UNI 2-3 VIEWS: CPT

## 2021-01-01 PROCEDURE — 86923 COMPATIBILITY TEST ELECTRIC: CPT

## 2021-01-01 PROCEDURE — 83735 ASSAY OF MAGNESIUM: CPT

## 2021-01-01 PROCEDURE — 86901 BLOOD TYPING SEROLOGIC RH(D): CPT

## 2021-01-01 PROCEDURE — 86900 BLOOD TYPING SEROLOGIC ABO: CPT

## 2021-01-01 PROCEDURE — 0DH63UZ INSERTION OF FEEDING DEVICE INTO STOMACH, PERCUTANEOUS APPROACH: ICD-10-PCS | Performed by: SURGERY

## 2021-01-01 PROCEDURE — 7100000000 HC PACU RECOVERY - FIRST 15 MIN: Performed by: SURGERY

## 2021-01-01 PROCEDURE — 88305 TISSUE EXAM BY PATHOLOGIST: CPT

## 2021-01-01 PROCEDURE — 82306 VITAMIN D 25 HYDROXY: CPT

## 2021-01-01 PROCEDURE — 72125 CT NECK SPINE W/O DYE: CPT

## 2021-01-01 PROCEDURE — 84484 ASSAY OF TROPONIN QUANT: CPT

## 2021-01-01 PROCEDURE — 82962 GLUCOSE BLOOD TEST: CPT

## 2021-01-01 PROCEDURE — 2060000000 HC ICU INTERMEDIATE R&B

## 2021-01-01 PROCEDURE — 80053 COMPREHEN METABOLIC PANEL: CPT

## 2021-01-01 PROCEDURE — 2500000003 HC RX 250 WO HCPCS: Performed by: ORTHOPAEDIC SURGERY

## 2021-01-01 PROCEDURE — 85025 COMPLETE CBC W/AUTO DIFF WBC: CPT

## 2021-01-01 PROCEDURE — 87635 SARS-COV-2 COVID-19 AMP PRB: CPT

## 2021-01-01 PROCEDURE — 6370000000 HC RX 637 (ALT 250 FOR IP): Performed by: INTERNAL MEDICINE

## 2021-01-01 PROCEDURE — 0SRS0JZ REPLACEMENT OF LEFT HIP JOINT, FEMORAL SURFACE WITH SYNTHETIC SUBSTITUTE, OPEN APPROACH: ICD-10-PCS | Performed by: OBSTETRICS & GYNECOLOGY

## 2021-01-01 PROCEDURE — 99212 OFFICE O/P EST SF 10 MIN: CPT | Performed by: PHYSICIAN ASSISTANT

## 2021-01-01 PROCEDURE — 99221 1ST HOSP IP/OBS SF/LOW 40: CPT | Performed by: SURGERY

## 2021-01-01 PROCEDURE — 7100000000 HC PACU RECOVERY - FIRST 15 MIN: Performed by: ORTHOPAEDIC SURGERY

## 2021-01-01 PROCEDURE — 84478 ASSAY OF TRIGLYCERIDES: CPT

## 2021-01-01 PROCEDURE — 99282 EMERGENCY DEPT VISIT SF MDM: CPT

## 2021-01-01 PROCEDURE — 96372 THER/PROPH/DIAG INJ SC/IM: CPT

## 2021-01-01 PROCEDURE — 74230 X-RAY XM SWLNG FUNCJ C+: CPT

## 2021-01-01 PROCEDURE — 2580000003 HC RX 258

## 2021-01-01 PROCEDURE — 6360000002 HC RX W HCPCS

## 2021-01-01 PROCEDURE — 2500000003 HC RX 250 WO HCPCS: Performed by: FAMILY MEDICINE

## 2021-01-01 PROCEDURE — 1200000000 HC SEMI PRIVATE

## 2021-01-01 PROCEDURE — 97530 THERAPEUTIC ACTIVITIES: CPT | Performed by: PHYSICAL THERAPIST

## 2021-01-01 PROCEDURE — 85730 THROMBOPLASTIN TIME PARTIAL: CPT

## 2021-01-01 PROCEDURE — 3600000015 HC SURGERY LEVEL 5 ADDTL 15MIN: Performed by: ORTHOPAEDIC SURGERY

## 2021-01-01 PROCEDURE — 82728 ASSAY OF FERRITIN: CPT

## 2021-01-01 PROCEDURE — 99213 OFFICE O/P EST LOW 20 MIN: CPT | Performed by: FAMILY MEDICINE

## 2021-01-01 PROCEDURE — 2709999900 HC NON-CHARGEABLE SUPPLY: Performed by: SURGERY

## 2021-01-01 PROCEDURE — 6360000002 HC RX W HCPCS: Performed by: ORTHOPAEDIC SURGERY

## 2021-01-01 PROCEDURE — 99212 OFFICE O/P EST SF 10 MIN: CPT | Performed by: SURGERY

## 2021-01-01 PROCEDURE — 3609013300 HC EGD TUBE PLACEMENT: Performed by: SURGERY

## 2021-01-01 PROCEDURE — 43246 EGD PLACE GASTROSTOMY TUBE: CPT | Performed by: SURGERY

## 2021-01-01 PROCEDURE — 6360000002 HC RX W HCPCS: Performed by: PHYSICIAN ASSISTANT

## 2021-01-01 PROCEDURE — 99211 OFF/OP EST MAY X REQ PHY/QHP: CPT

## 2021-01-01 PROCEDURE — 84145 PROCALCITONIN (PCT): CPT

## 2021-01-01 PROCEDURE — 82805 BLOOD GASES W/O2 SATURATION: CPT

## 2021-01-01 PROCEDURE — 93010 ELECTROCARDIOGRAM REPORT: CPT | Performed by: INTERNAL MEDICINE

## 2021-01-01 PROCEDURE — P9016 RBC LEUKOCYTES REDUCED: HCPCS

## 2021-01-01 PROCEDURE — 3700000000 HC ANESTHESIA ATTENDED CARE: Performed by: SURGERY

## 2021-01-01 PROCEDURE — 2500000003 HC RX 250 WO HCPCS: Performed by: RADIOLOGY

## 2021-01-01 PROCEDURE — 7100000001 HC PACU RECOVERY - ADDTL 15 MIN: Performed by: ORTHOPAEDIC SURGERY

## 2021-01-01 PROCEDURE — 6360000004 HC RX CONTRAST MEDICATION: Performed by: RADIOLOGY

## 2021-01-01 PROCEDURE — 82330 ASSAY OF CALCIUM: CPT

## 2021-01-01 PROCEDURE — 92526 ORAL FUNCTION THERAPY: CPT | Performed by: SPEECH-LANGUAGE PATHOLOGIST

## 2021-01-01 PROCEDURE — 99223 1ST HOSP IP/OBS HIGH 75: CPT | Performed by: PSYCHIATRY & NEUROLOGY

## 2021-01-01 PROCEDURE — 2500000003 HC RX 250 WO HCPCS: Performed by: SURGERY

## 2021-01-01 PROCEDURE — 85018 HEMOGLOBIN: CPT

## 2021-01-01 PROCEDURE — 7100000001 HC PACU RECOVERY - ADDTL 15 MIN: Performed by: SURGERY

## 2021-01-01 PROCEDURE — 99284 EMERGENCY DEPT VISIT MOD MDM: CPT | Performed by: ORTHOPAEDIC SURGERY

## 2021-01-01 PROCEDURE — 83540 ASSAY OF IRON: CPT

## 2021-01-01 PROCEDURE — 85610 PROTHROMBIN TIME: CPT

## 2021-01-01 PROCEDURE — 99213 OFFICE O/P EST LOW 20 MIN: CPT

## 2021-01-01 PROCEDURE — 99283 EMERGENCY DEPT VISIT LOW MDM: CPT

## 2021-01-01 PROCEDURE — 3E0G76Z INTRODUCTION OF NUTRITIONAL SUBSTANCE INTO UPPER GI, VIA NATURAL OR ARTIFICIAL OPENING: ICD-10-PCS | Performed by: SURGERY

## 2021-01-01 PROCEDURE — 3700000001 HC ADD 15 MINUTES (ANESTHESIA): Performed by: SURGERY

## 2021-01-01 PROCEDURE — 2500000003 HC RX 250 WO HCPCS: Performed by: STUDENT IN AN ORGANIZED HEALTH CARE EDUCATION/TRAINING PROGRAM

## 2021-01-01 PROCEDURE — 88112 CYTOPATH CELL ENHANCE TECH: CPT

## 2021-01-01 PROCEDURE — 2580000003 HC RX 258: Performed by: ORTHOPAEDIC SURGERY

## 2021-01-01 PROCEDURE — 2709999900 HC NON-CHARGEABLE SUPPLY: Performed by: ORTHOPAEDIC SURGERY

## 2021-01-01 PROCEDURE — 97165 OT EVAL LOW COMPLEX 30 MIN: CPT

## 2021-01-01 PROCEDURE — 73552 X-RAY EXAM OF FEMUR 2/>: CPT

## 2021-01-01 DEVICE — COMPONENT TOT HIP CAPPED BPLR UPLR CEM STEM H4STRYKER] STRYKER CORP]: Type: IMPLANTABLE DEVICE | Site: HIP | Status: FUNCTIONAL

## 2021-01-01 DEVICE — HEAD FEM OD52MM ID26MM HIP CO CHROM POLYETH BPLR CEMENTLESS: Type: IMPLANTABLE DEVICE | Site: HIP | Status: FUNCTIONAL

## 2021-01-01 DEVICE — STEM FEM SZ 4 L125MM NK L35MM +44MM OFFSET 127DEG HIP CO: Type: IMPLANTABLE DEVICE | Site: HIP | Status: FUNCTIONAL

## 2021-01-01 RX ORDER — TOLNAFTATE 1 G/100G
POWDER TOPICAL
COMMUNITY
Start: 2021-02-09 | End: 2022-01-01 | Stop reason: ALTCHOICE

## 2021-01-01 RX ORDER — DEXTROSE MONOHYDRATE 100 MG/ML
INJECTION, SOLUTION INTRAVENOUS CONTINUOUS
Status: DISCONTINUED | OUTPATIENT
Start: 2021-01-01 | End: 2021-01-01

## 2021-01-01 RX ORDER — LIDOCAINE HYDROCHLORIDE 20 MG/ML
INJECTION, SOLUTION INTRAVENOUS PRN
Status: DISCONTINUED | OUTPATIENT
Start: 2021-01-01 | End: 2021-01-01 | Stop reason: SDUPTHER

## 2021-01-01 RX ORDER — MORPHINE SULFATE 2 MG/ML
2 INJECTION, SOLUTION INTRAMUSCULAR; INTRAVENOUS
Status: DISCONTINUED | OUTPATIENT
Start: 2021-01-01 | End: 2021-01-01

## 2021-01-01 RX ORDER — HYDRALAZINE HYDROCHLORIDE 20 MG/ML
10 INJECTION INTRAMUSCULAR; INTRAVENOUS EVERY 6 HOURS PRN
Status: DISCONTINUED | OUTPATIENT
Start: 2021-01-01 | End: 2021-01-01

## 2021-01-01 RX ORDER — CEFAZOLIN SODIUM 1 G/3ML
INJECTION, POWDER, FOR SOLUTION INTRAMUSCULAR; INTRAVENOUS PRN
Status: DISCONTINUED | OUTPATIENT
Start: 2021-01-01 | End: 2021-01-01 | Stop reason: SDUPTHER

## 2021-01-01 RX ORDER — OXYCODONE HYDROCHLORIDE 5 MG/1
2.5 TABLET ORAL EVERY 6 HOURS PRN
Status: DISCONTINUED | OUTPATIENT
Start: 2021-01-01 | End: 2021-01-01

## 2021-01-01 RX ORDER — ACETAMINOPHEN 325 MG/1
650 TABLET ORAL EVERY 6 HOURS
Status: DISCONTINUED | OUTPATIENT
Start: 2021-01-01 | End: 2021-01-01 | Stop reason: CLARIF

## 2021-01-01 RX ORDER — POTASSIUM CHLORIDE 7.45 MG/ML
10 INJECTION INTRAVENOUS
Status: COMPLETED | OUTPATIENT
Start: 2021-01-01 | End: 2021-01-01

## 2021-01-01 RX ORDER — BACITRACIN ZINC AND POLYMYXIN B SULFATE 500; 1000 [USP'U]/G; [USP'U]/G
OINTMENT TOPICAL ONCE
Status: CANCELLED | OUTPATIENT
Start: 2021-01-01 | End: 2021-01-01

## 2021-01-01 RX ORDER — BACITRACIN, NEOMYCIN, POLYMYXIN B 400; 3.5; 5 [USP'U]/G; MG/G; [USP'U]/G
OINTMENT TOPICAL ONCE
Status: CANCELLED | OUTPATIENT
Start: 2021-01-01 | End: 2021-01-01

## 2021-01-01 RX ORDER — METOCLOPRAMIDE 10 MG/1
10 TABLET ORAL 3 TIMES DAILY PRN
COMMUNITY
End: 2022-01-01 | Stop reason: ALTCHOICE

## 2021-01-01 RX ORDER — ASPIRIN 81 MG/1
81 TABLET, CHEWABLE ORAL DAILY
Status: DISCONTINUED | OUTPATIENT
Start: 2021-01-01 | End: 2021-01-01

## 2021-01-01 RX ORDER — SODIUM CHLORIDE 9 MG/ML
INJECTION, SOLUTION INTRAVENOUS PRN
Status: DISCONTINUED | OUTPATIENT
Start: 2021-01-01 | End: 2021-01-01 | Stop reason: HOSPADM

## 2021-01-01 RX ORDER — LIDOCAINE HYDROCHLORIDE 40 MG/ML
SOLUTION TOPICAL ONCE
Status: DISCONTINUED | OUTPATIENT
Start: 2021-01-01 | End: 2021-01-01 | Stop reason: HOSPADM

## 2021-01-01 RX ORDER — PROPOFOL 10 MG/ML
INJECTION, EMULSION INTRAVENOUS PRN
Status: DISCONTINUED | OUTPATIENT
Start: 2021-01-01 | End: 2021-01-01 | Stop reason: SDUPTHER

## 2021-01-01 RX ORDER — ZIPRASIDONE MESYLATE 20 MG/ML
10 INJECTION, POWDER, LYOPHILIZED, FOR SOLUTION INTRAMUSCULAR ONCE
Status: COMPLETED | OUTPATIENT
Start: 2021-01-01 | End: 2021-01-01

## 2021-01-01 RX ORDER — NEOSTIGMINE METHYLSULFATE 1 MG/ML
INJECTION, SOLUTION INTRAVENOUS PRN
Status: DISCONTINUED | OUTPATIENT
Start: 2021-01-01 | End: 2021-01-01 | Stop reason: SDUPTHER

## 2021-01-01 RX ORDER — MORPHINE SULFATE 2 MG/ML
1 INJECTION, SOLUTION INTRAMUSCULAR; INTRAVENOUS
Status: DISCONTINUED | OUTPATIENT
Start: 2021-01-01 | End: 2021-01-01 | Stop reason: HOSPADM

## 2021-01-01 RX ORDER — ATROPA BELLADONNA AND OPIUM 16.2; 6 MG/1; MG/1
60 SUPPOSITORY RECTAL ONCE
Status: COMPLETED | OUTPATIENT
Start: 2021-01-01 | End: 2021-01-01

## 2021-01-01 RX ORDER — OXYCODONE HYDROCHLORIDE AND ACETAMINOPHEN 5; 325 MG/1; MG/1
2 TABLET ORAL PRN
Status: DISCONTINUED | OUTPATIENT
Start: 2021-01-01 | End: 2021-01-01 | Stop reason: HOSPADM

## 2021-01-01 RX ORDER — MORPHINE SULFATE 2 MG/ML
2 INJECTION, SOLUTION INTRAMUSCULAR; INTRAVENOUS
Status: DISCONTINUED | OUTPATIENT
Start: 2021-01-01 | End: 2021-01-01 | Stop reason: HOSPADM

## 2021-01-01 RX ORDER — FUROSEMIDE 10 MG/ML
20 INJECTION INTRAMUSCULAR; INTRAVENOUS ONCE
Status: DISCONTINUED | OUTPATIENT
Start: 2021-01-01 | End: 2021-01-01

## 2021-01-01 RX ORDER — OXYCODONE HYDROCHLORIDE AND ACETAMINOPHEN 5; 325 MG/1; MG/1
1 TABLET ORAL PRN
Status: DISCONTINUED | OUTPATIENT
Start: 2021-01-01 | End: 2021-01-01 | Stop reason: HOSPADM

## 2021-01-01 RX ORDER — OXYCODONE HYDROCHLORIDE AND ACETAMINOPHEN 5; 325 MG/1; MG/1
1 TABLET ORAL EVERY 6 HOURS PRN
Qty: 28 TABLET | Refills: 0 | Status: SHIPPED | OUTPATIENT
Start: 2021-01-01 | End: 2021-01-01

## 2021-01-01 RX ORDER — GUAIFENESIN 400 MG/1
400 TABLET ORAL DAILY
COMMUNITY
End: 2022-01-01 | Stop reason: DRUGHIGH

## 2021-01-01 RX ORDER — MORPHINE SULFATE 2 MG/ML
2 INJECTION, SOLUTION INTRAMUSCULAR; INTRAVENOUS EVERY 5 MIN PRN
Status: DISCONTINUED | OUTPATIENT
Start: 2021-01-01 | End: 2021-01-01 | Stop reason: HOSPADM

## 2021-01-01 RX ORDER — PRIMIDONE 50 MG/1
50 TABLET ORAL EVERY MORNING
COMMUNITY

## 2021-01-01 RX ORDER — POTASSIUM CHLORIDE 20 MEQ/1
40 TABLET, EXTENDED RELEASE ORAL PRN
Status: DISCONTINUED | OUTPATIENT
Start: 2021-01-01 | End: 2021-01-01

## 2021-01-01 RX ORDER — LIDOCAINE HYDROCHLORIDE 20 MG/ML
JELLY TOPICAL ONCE
Status: CANCELLED | OUTPATIENT
Start: 2021-01-01 | End: 2021-01-01

## 2021-01-01 RX ORDER — OXYCODONE HYDROCHLORIDE 10 MG/1
10 TABLET ORAL EVERY 4 HOURS PRN
Status: DISCONTINUED | OUTPATIENT
Start: 2021-01-01 | End: 2021-01-01

## 2021-01-01 RX ORDER — GLYCOPYRROLATE 1 MG/5 ML
SYRINGE (ML) INTRAVENOUS PRN
Status: DISCONTINUED | OUTPATIENT
Start: 2021-01-01 | End: 2021-01-01 | Stop reason: SDUPTHER

## 2021-01-01 RX ORDER — DIAPER,BRIEF,INFANT-TODD,DISP
EACH MISCELLANEOUS
Qty: 20 G | Refills: 0 | Status: SHIPPED | OUTPATIENT
Start: 2021-01-01 | End: 2021-01-01

## 2021-01-01 RX ORDER — GINSENG 100 MG
CAPSULE ORAL ONCE
Status: CANCELLED | OUTPATIENT
Start: 2021-01-01 | End: 2021-01-01

## 2021-01-01 RX ORDER — TRANEXAMIC ACID 100 MG/ML
INJECTION, SOLUTION INTRAVENOUS PRN
Status: DISCONTINUED | OUTPATIENT
Start: 2021-01-01 | End: 2021-01-01 | Stop reason: ALTCHOICE

## 2021-01-01 RX ORDER — GENTAMICIN SULFATE 1 MG/G
OINTMENT TOPICAL ONCE
Status: CANCELLED | OUTPATIENT
Start: 2021-01-01 | End: 2021-01-01

## 2021-01-01 RX ORDER — CEFDINIR 300 MG/1
300 CAPSULE ORAL 2 TIMES DAILY
Qty: 14 CAPSULE | Refills: 0 | Status: SHIPPED | OUTPATIENT
Start: 2021-01-01 | End: 2021-01-01

## 2021-01-01 RX ORDER — SODIUM CHLORIDE 9 MG/ML
INJECTION, SOLUTION INTRAVENOUS CONTINUOUS
Status: ACTIVE | OUTPATIENT
Start: 2021-01-01 | End: 2021-01-01

## 2021-01-01 RX ORDER — SODIUM CHLORIDE 0.9 % (FLUSH) 0.9 %
5-40 SYRINGE (ML) INJECTION EVERY 12 HOURS SCHEDULED
Status: DISCONTINUED | OUTPATIENT
Start: 2021-01-01 | End: 2021-01-01 | Stop reason: HOSPADM

## 2021-01-01 RX ORDER — FUROSEMIDE 10 MG/ML
20 INJECTION INTRAMUSCULAR; INTRAVENOUS ONCE
Status: COMPLETED | OUTPATIENT
Start: 2021-01-01 | End: 2021-01-01

## 2021-01-01 RX ORDER — ONDANSETRON 4 MG/1
4 TABLET, ORALLY DISINTEGRATING ORAL EVERY 8 HOURS PRN
Status: DISCONTINUED | OUTPATIENT
Start: 2021-01-01 | End: 2021-01-01

## 2021-01-01 RX ORDER — PRAVASTATIN SODIUM 20 MG
20 TABLET ORAL DAILY
Status: DISCONTINUED | OUTPATIENT
Start: 2021-01-01 | End: 2021-01-01 | Stop reason: HOSPADM

## 2021-01-01 RX ORDER — SODIUM CHLORIDE 9 MG/ML
INJECTION, SOLUTION INTRAVENOUS CONTINUOUS PRN
Status: DISCONTINUED | OUTPATIENT
Start: 2021-01-01 | End: 2021-01-01 | Stop reason: SDUPTHER

## 2021-01-01 RX ORDER — BETAMETHASONE DIPROPIONATE 0.05 %
OINTMENT (GRAM) TOPICAL ONCE
Status: CANCELLED | OUTPATIENT
Start: 2021-01-01 | End: 2021-01-01

## 2021-01-01 RX ORDER — OXYCODONE HYDROCHLORIDE 5 MG/1
2.5 TABLET ORAL EVERY 4 HOURS PRN
Status: DISCONTINUED | OUTPATIENT
Start: 2021-01-01 | End: 2021-01-01 | Stop reason: HOSPADM

## 2021-01-01 RX ORDER — LIDOCAINE 50 MG/G
OINTMENT TOPICAL ONCE
Status: CANCELLED | OUTPATIENT
Start: 2021-01-01 | End: 2021-01-01

## 2021-01-01 RX ORDER — CHOLECALCIFEROL (VITAMIN D3) 50 MCG
2000 TABLET ORAL DAILY
Status: DISCONTINUED | OUTPATIENT
Start: 2021-01-01 | End: 2021-01-01 | Stop reason: HOSPADM

## 2021-01-01 RX ORDER — ZIPRASIDONE MESYLATE 20 MG/ML
INJECTION, POWDER, LYOPHILIZED, FOR SOLUTION INTRAMUSCULAR
Status: COMPLETED
Start: 2021-01-01 | End: 2021-01-01

## 2021-01-01 RX ORDER — SODIUM CHLORIDE 0.9 % (FLUSH) 0.9 %
10 SYRINGE (ML) INJECTION ONCE
Status: DISCONTINUED | OUTPATIENT
Start: 2021-01-01 | End: 2021-01-01 | Stop reason: HOSPADM

## 2021-01-01 RX ORDER — PROMETHAZINE HYDROCHLORIDE 25 MG/1
12.5 TABLET ORAL EVERY 6 HOURS PRN
Status: DISCONTINUED | OUTPATIENT
Start: 2021-01-01 | End: 2021-01-01 | Stop reason: HOSPADM

## 2021-01-01 RX ORDER — DONEPEZIL HYDROCHLORIDE 5 MG/1
10 TABLET, FILM COATED ORAL NIGHTLY
Status: DISCONTINUED | OUTPATIENT
Start: 2021-01-01 | End: 2021-01-01 | Stop reason: HOSPADM

## 2021-01-01 RX ORDER — MORPHINE SULFATE 2 MG/ML
1 INJECTION, SOLUTION INTRAMUSCULAR; INTRAVENOUS EVERY 5 MIN PRN
Status: DISCONTINUED | OUTPATIENT
Start: 2021-01-01 | End: 2021-01-01 | Stop reason: HOSPADM

## 2021-01-01 RX ORDER — POLYETHYLENE GLYCOL 3350 17 G/17G
POWDER, FOR SOLUTION ORAL
COMMUNITY
End: 2022-01-01 | Stop reason: ALTCHOICE

## 2021-01-01 RX ORDER — MORPHINE SULFATE 4 MG/ML
4 INJECTION, SOLUTION INTRAMUSCULAR; INTRAVENOUS
Status: DISCONTINUED | OUTPATIENT
Start: 2021-01-01 | End: 2021-01-01

## 2021-01-01 RX ORDER — DEXAMETHASONE SODIUM PHOSPHATE 10 MG/ML
INJECTION INTRAMUSCULAR; INTRAVENOUS PRN
Status: DISCONTINUED | OUTPATIENT
Start: 2021-01-01 | End: 2021-01-01 | Stop reason: SDUPTHER

## 2021-01-01 RX ORDER — LIDOCAINE HYDROCHLORIDE 10 MG/ML
INJECTION, SOLUTION INFILTRATION; PERINEURAL PRN
Status: DISCONTINUED | OUTPATIENT
Start: 2021-01-01 | End: 2021-01-01 | Stop reason: ALTCHOICE

## 2021-01-01 RX ORDER — MORPHINE SULFATE 2 MG/ML
2 INJECTION, SOLUTION INTRAMUSCULAR; INTRAVENOUS ONCE
Status: COMPLETED | OUTPATIENT
Start: 2021-01-01 | End: 2021-01-01

## 2021-01-01 RX ORDER — QUETIAPINE FUMARATE 25 MG/1
12.5 TABLET, FILM COATED ORAL ONCE
Status: COMPLETED | OUTPATIENT
Start: 2021-01-01 | End: 2021-01-01

## 2021-01-01 RX ORDER — LIDOCAINE HYDROCHLORIDE 40 MG/ML
SOLUTION TOPICAL ONCE
Status: CANCELLED | OUTPATIENT
Start: 2021-01-01 | End: 2021-01-01

## 2021-01-01 RX ORDER — SODIUM CHLORIDE 9 MG/ML
25 INJECTION, SOLUTION INTRAVENOUS PRN
Status: DISCONTINUED | OUTPATIENT
Start: 2021-01-01 | End: 2021-01-01 | Stop reason: HOSPADM

## 2021-01-01 RX ORDER — FENTANYL CITRATE 50 UG/ML
INJECTION, SOLUTION INTRAMUSCULAR; INTRAVENOUS PRN
Status: DISCONTINUED | OUTPATIENT
Start: 2021-01-01 | End: 2021-01-01 | Stop reason: SDUPTHER

## 2021-01-01 RX ORDER — CHOLECALCIFEROL (VITAMIN D3) 125 MCG
5 CAPSULE ORAL NIGHTLY
COMMUNITY

## 2021-01-01 RX ORDER — OXYCODONE HYDROCHLORIDE 5 MG/1
5 TABLET ORAL EVERY 4 HOURS PRN
Status: DISCONTINUED | OUTPATIENT
Start: 2021-01-01 | End: 2021-01-01 | Stop reason: HOSPADM

## 2021-01-01 RX ORDER — PRIMIDONE 50 MG/1
50 TABLET ORAL 2 TIMES DAILY
Status: DISCONTINUED | OUTPATIENT
Start: 2021-01-01 | End: 2021-01-01 | Stop reason: HOSPADM

## 2021-01-01 RX ORDER — NICOTINE POLACRILEX 4 MG/1
20 GUM, CHEWING ORAL 2 TIMES DAILY
COMMUNITY

## 2021-01-01 RX ORDER — CLOBETASOL PROPIONATE 0.5 MG/G
OINTMENT TOPICAL ONCE
Status: CANCELLED | OUTPATIENT
Start: 2021-01-01 | End: 2021-01-01

## 2021-01-01 RX ORDER — ONDANSETRON 2 MG/ML
4 INJECTION INTRAMUSCULAR; INTRAVENOUS EVERY 6 HOURS PRN
Status: DISCONTINUED | OUTPATIENT
Start: 2021-01-01 | End: 2021-01-01

## 2021-01-01 RX ORDER — ENALAPRIL MALEATE 2.5 MG/1
TABLET ORAL
COMMUNITY
Start: 2020-12-15 | End: 2021-01-01

## 2021-01-01 RX ORDER — SODIUM CHLORIDE, SODIUM LACTATE, POTASSIUM CHLORIDE, CALCIUM CHLORIDE 600; 310; 30; 20 MG/100ML; MG/100ML; MG/100ML; MG/100ML
INJECTION, SOLUTION INTRAVENOUS CONTINUOUS
Status: DISCONTINUED | OUTPATIENT
Start: 2021-01-01 | End: 2021-01-01

## 2021-01-01 RX ORDER — SODIUM CHLORIDE 9 MG/ML
INJECTION, SOLUTION INTRAVENOUS CONTINUOUS
Status: DISCONTINUED | OUTPATIENT
Start: 2021-01-01 | End: 2021-01-01

## 2021-01-01 RX ORDER — MEPERIDINE HYDROCHLORIDE 25 MG/ML
12.5 INJECTION INTRAMUSCULAR; INTRAVENOUS; SUBCUTANEOUS
Status: DISCONTINUED | OUTPATIENT
Start: 2021-01-01 | End: 2021-01-01 | Stop reason: HOSPADM

## 2021-01-01 RX ORDER — ACETAMINOPHEN 325 MG/1
650 TABLET ORAL EVERY 6 HOURS PRN
COMMUNITY

## 2021-01-01 RX ORDER — OXYCODONE HYDROCHLORIDE AND ACETAMINOPHEN 5; 325 MG/1; MG/1
1 TABLET ORAL EVERY 6 HOURS PRN
COMMUNITY
End: 2021-01-01

## 2021-01-01 RX ORDER — POTASSIUM CHLORIDE 7.45 MG/ML
10 INJECTION INTRAVENOUS PRN
Status: DISCONTINUED | OUTPATIENT
Start: 2021-01-01 | End: 2021-01-01

## 2021-01-01 RX ORDER — ONDANSETRON 2 MG/ML
4 INJECTION INTRAMUSCULAR; INTRAVENOUS
Status: DISCONTINUED | OUTPATIENT
Start: 2021-01-01 | End: 2021-01-01 | Stop reason: HOSPADM

## 2021-01-01 RX ORDER — SENNA AND DOCUSATE SODIUM 50; 8.6 MG/1; MG/1
1 TABLET, FILM COATED ORAL 2 TIMES DAILY
Status: DISCONTINUED | OUTPATIENT
Start: 2021-01-01 | End: 2021-01-01 | Stop reason: HOSPADM

## 2021-01-01 RX ORDER — SODIUM CHLORIDE 0.9 % (FLUSH) 0.9 %
5-40 SYRINGE (ML) INJECTION PRN
Status: DISCONTINUED | OUTPATIENT
Start: 2021-01-01 | End: 2021-01-01 | Stop reason: HOSPADM

## 2021-01-01 RX ORDER — ACETAMINOPHEN 325 MG/1
650 TABLET ORAL EVERY 4 HOURS PRN
Status: DISCONTINUED | OUTPATIENT
Start: 2021-01-01 | End: 2021-01-01

## 2021-01-01 RX ORDER — DEXTROSE, SODIUM CHLORIDE, AND POTASSIUM CHLORIDE 5; .45; .15 G/100ML; G/100ML; G/100ML
INJECTION INTRAVENOUS CONTINUOUS
Status: DISCONTINUED | OUTPATIENT
Start: 2021-01-01 | End: 2021-01-01

## 2021-01-01 RX ORDER — ROCURONIUM BROMIDE 10 MG/ML
INJECTION, SOLUTION INTRAVENOUS PRN
Status: DISCONTINUED | OUTPATIENT
Start: 2021-01-01 | End: 2021-01-01 | Stop reason: SDUPTHER

## 2021-01-01 RX ORDER — DEXTRAN 70, GLYCERIN, HYPROMELLOSE 1; 2; 3 MG/ML; MG/ML; MG/ML
1 SOLUTION/ DROPS OPHTHALMIC PRN
Status: DISCONTINUED | OUTPATIENT
Start: 2021-01-01 | End: 2021-01-01 | Stop reason: HOSPADM

## 2021-01-01 RX ORDER — ACETAMINOPHEN 160 MG/5ML
650 SOLUTION ORAL EVERY 6 HOURS
Status: DISCONTINUED | OUTPATIENT
Start: 2021-01-01 | End: 2021-01-01 | Stop reason: HOSPADM

## 2021-01-01 RX ORDER — OXYCODONE HYDROCHLORIDE 5 MG/1
5 TABLET ORAL EVERY 4 HOURS PRN
Status: DISCONTINUED | OUTPATIENT
Start: 2021-01-01 | End: 2021-01-01

## 2021-01-01 RX ORDER — ONDANSETRON 2 MG/ML
4 INJECTION INTRAMUSCULAR; INTRAVENOUS EVERY 6 HOURS PRN
Status: DISCONTINUED | OUTPATIENT
Start: 2021-01-01 | End: 2021-01-01 | Stop reason: HOSPADM

## 2021-01-01 RX ORDER — TOBRAMYCIN 1.2 G/30ML
INJECTION, POWDER, LYOPHILIZED, FOR SOLUTION INTRAVENOUS PRN
Status: DISCONTINUED | OUTPATIENT
Start: 2021-01-01 | End: 2021-01-01 | Stop reason: ALTCHOICE

## 2021-01-01 RX ORDER — DIAZEPAM 5 MG/1
5 TABLET ORAL ONCE
Status: DISCONTINUED | OUTPATIENT
Start: 2021-01-01 | End: 2021-01-01

## 2021-01-01 RX ORDER — LIDOCAINE 40 MG/G
CREAM TOPICAL ONCE
Status: CANCELLED | OUTPATIENT
Start: 2021-01-01 | End: 2021-01-01

## 2021-01-01 RX ORDER — PANTOPRAZOLE SODIUM 40 MG/1
40 TABLET, DELAYED RELEASE ORAL
Status: DISCONTINUED | OUTPATIENT
Start: 2021-01-01 | End: 2021-01-01 | Stop reason: HOSPADM

## 2021-01-01 RX ORDER — HALOPERIDOL 5 MG/ML
2 INJECTION INTRAMUSCULAR ONCE
Status: COMPLETED | OUTPATIENT
Start: 2021-01-01 | End: 2021-01-01

## 2021-01-01 RX ORDER — ENALAPRIL MALEATE 5 MG/1
2.5 TABLET ORAL DAILY
Status: DISCONTINUED | OUTPATIENT
Start: 2021-01-01 | End: 2021-01-01 | Stop reason: HOSPADM

## 2021-01-01 RX ADMIN — DONEPEZIL HYDROCHLORIDE 10 MG: 5 TABLET, FILM COATED ORAL at 22:22

## 2021-01-01 RX ADMIN — Medication 2000 MG: at 04:45

## 2021-01-01 RX ADMIN — PRAVASTATIN SODIUM 20 MG: 20 TABLET ORAL at 09:56

## 2021-01-01 RX ADMIN — ACETAMINOPHEN 650 MG: 325 TABLET ORAL at 21:07

## 2021-01-01 RX ADMIN — POTASSIUM CHLORIDE, DEXTROSE MONOHYDRATE AND SODIUM CHLORIDE: 150; 5; 450 INJECTION, SOLUTION INTRAVENOUS at 21:39

## 2021-01-01 RX ADMIN — DEXAMETHASONE SODIUM PHOSPHATE 10 MG: 10 INJECTION INTRAMUSCULAR; INTRAVENOUS at 11:53

## 2021-01-01 RX ADMIN — BENZOCAINE: 200 SPRAY DENTAL; ORAL; PERIODONTAL at 14:43

## 2021-01-01 RX ADMIN — Medication 2000 UNITS: at 09:55

## 2021-01-01 RX ADMIN — Medication 10 ML: at 09:05

## 2021-01-01 RX ADMIN — HALOPERIDOL LACTATE 2 MG: 5 INJECTION, SOLUTION INTRAMUSCULAR at 00:51

## 2021-01-01 RX ADMIN — Medication 0.6 MG: at 12:30

## 2021-01-01 RX ADMIN — QUETIAPINE FUMARATE 12.5 MG: 25 TABLET ORAL at 00:25

## 2021-01-01 RX ADMIN — BARIUM SULFATE 15 ML: 400 SUSPENSION ORAL at 11:03

## 2021-01-01 RX ADMIN — BARIUM SULFATE 15 ML: 400 PASTE ORAL at 10:09

## 2021-01-01 RX ADMIN — DONEPEZIL HYDROCHLORIDE 10 MG: 5 TABLET, FILM COATED ORAL at 21:05

## 2021-01-01 RX ADMIN — Medication 10 ML: at 09:11

## 2021-01-01 RX ADMIN — SODIUM CHLORIDE, POTASSIUM CHLORIDE, SODIUM LACTATE AND CALCIUM CHLORIDE: 600; 310; 30; 20 INJECTION, SOLUTION INTRAVENOUS at 06:39

## 2021-01-01 RX ADMIN — SODIUM CHLORIDE: 9 INJECTION, SOLUTION INTRAVENOUS at 16:53

## 2021-01-01 RX ADMIN — SERTRALINE 25 MG: 50 TABLET, FILM COATED ORAL at 09:56

## 2021-01-01 RX ADMIN — OXYCODONE HYDROCHLORIDE 2.5 MG: 5 TABLET ORAL at 22:50

## 2021-01-01 RX ADMIN — PRAVASTATIN SODIUM 20 MG: 20 TABLET ORAL at 08:46

## 2021-01-01 RX ADMIN — Medication 10 ML: at 21:07

## 2021-01-01 RX ADMIN — CARBIDOPA AND LEVODOPA 1 TABLET: 25; 100 TABLET ORAL at 21:04

## 2021-01-01 RX ADMIN — PROPOFOL 50 MG: 10 INJECTION, EMULSION INTRAVENOUS at 16:58

## 2021-01-01 RX ADMIN — ACETAMINOPHEN 650 MG: 325 TABLET ORAL at 11:03

## 2021-01-01 RX ADMIN — SODIUM CHLORIDE: 9 INJECTION, SOLUTION INTRAVENOUS at 11:35

## 2021-01-01 RX ADMIN — SODIUM CHLORIDE, POTASSIUM CHLORIDE, SODIUM LACTATE AND CALCIUM CHLORIDE: 600; 310; 30; 20 INJECTION, SOLUTION INTRAVENOUS at 17:04

## 2021-01-01 RX ADMIN — PIPERACILLIN AND TAZOBACTAM 3375 MG: 3; .375 INJECTION, POWDER, LYOPHILIZED, FOR SOLUTION INTRAVENOUS at 16:04

## 2021-01-01 RX ADMIN — ATROPA BELLADONNA AND OPIUM 60 MG: 16.2; 6 SUPPOSITORY RECTAL at 16:27

## 2021-01-01 RX ADMIN — ENOXAPARIN SODIUM 40 MG: 100 INJECTION SUBCUTANEOUS at 09:57

## 2021-01-01 RX ADMIN — ACETAMINOPHEN ORAL SOLUTION 650 MG: 650 SOLUTION ORAL at 11:28

## 2021-01-01 RX ADMIN — Medication 10 ML: at 09:59

## 2021-01-01 RX ADMIN — PIPERACILLIN AND TAZOBACTAM 3375 MG: 3; .375 INJECTION, POWDER, LYOPHILIZED, FOR SOLUTION INTRAVENOUS at 08:40

## 2021-01-01 RX ADMIN — SODIUM CHLORIDE: 9 INJECTION, SOLUTION INTRAVENOUS at 19:45

## 2021-01-01 RX ADMIN — SODIUM CHLORIDE 25 ML: 9 INJECTION, SOLUTION INTRAVENOUS at 05:25

## 2021-01-01 RX ADMIN — DOCUSATE SODIUM 50 MG AND SENNOSIDES 8.6 MG 1 TABLET: 8.6; 5 TABLET, FILM COATED ORAL at 21:35

## 2021-01-01 RX ADMIN — ENOXAPARIN SODIUM 40 MG: 100 INJECTION SUBCUTANEOUS at 09:21

## 2021-01-01 RX ADMIN — ACETAMINOPHEN 650 MG: 325 TABLET ORAL at 00:18

## 2021-01-01 RX ADMIN — MORPHINE SULFATE 2 MG: 2 INJECTION, SOLUTION INTRAMUSCULAR; INTRAVENOUS at 02:30

## 2021-01-01 RX ADMIN — DOCUSATE SODIUM 50 MG AND SENNOSIDES 8.6 MG 1 TABLET: 8.6; 5 TABLET, FILM COATED ORAL at 21:04

## 2021-01-01 RX ADMIN — PIPERACILLIN AND TAZOBACTAM 3375 MG: 3; .375 INJECTION, POWDER, LYOPHILIZED, FOR SOLUTION INTRAVENOUS at 21:03

## 2021-01-01 RX ADMIN — QUETIAPINE FUMARATE 12.5 MG: 25 TABLET ORAL at 18:29

## 2021-01-01 RX ADMIN — SODIUM CHLORIDE 25 ML: 9 INJECTION, SOLUTION INTRAVENOUS at 21:00

## 2021-01-01 RX ADMIN — Medication 10 ML: at 09:00

## 2021-01-01 RX ADMIN — ZIPRASIDONE MESYLATE 10 MG: 20 INJECTION, POWDER, LYOPHILIZED, FOR SOLUTION INTRAMUSCULAR at 19:15

## 2021-01-01 RX ADMIN — DOCUSATE SODIUM 50 MG AND SENNOSIDES 8.6 MG 1 TABLET: 8.6; 5 TABLET, FILM COATED ORAL at 22:24

## 2021-01-01 RX ADMIN — ENOXAPARIN SODIUM 40 MG: 100 INJECTION SUBCUTANEOUS at 23:30

## 2021-01-01 RX ADMIN — Medication 2000 UNITS: at 08:51

## 2021-01-01 RX ADMIN — POTASSIUM CHLORIDE 10 MEQ: 10 INJECTION, SOLUTION INTRAVENOUS at 10:28

## 2021-01-01 RX ADMIN — SODIUM CHLORIDE, POTASSIUM CHLORIDE, SODIUM LACTATE AND CALCIUM CHLORIDE: 600; 310; 30; 20 INJECTION, SOLUTION INTRAVENOUS at 12:54

## 2021-01-01 RX ADMIN — PIPERACILLIN AND TAZOBACTAM 3375 MG: 3; .375 INJECTION, POWDER, LYOPHILIZED, FOR SOLUTION INTRAVENOUS at 00:11

## 2021-01-01 RX ADMIN — BARIUM SULFATE 15 ML: 400 SUSPENSION ORAL at 10:09

## 2021-01-01 RX ADMIN — POTASSIUM CHLORIDE 10 MEQ: 10 INJECTION, SOLUTION INTRAVENOUS at 12:00

## 2021-01-01 RX ADMIN — Medication 10 ML: at 21:52

## 2021-01-01 RX ADMIN — PIPERACILLIN AND TAZOBACTAM 3375 MG: 3; .375 INJECTION, POWDER, LYOPHILIZED, FOR SOLUTION INTRAVENOUS at 04:07

## 2021-01-01 RX ADMIN — PANTOPRAZOLE SODIUM 40 MG: 40 TABLET, DELAYED RELEASE ORAL at 09:55

## 2021-01-01 RX ADMIN — POTASSIUM CHLORIDE 10 MEQ: 10 INJECTION, SOLUTION INTRAVENOUS at 11:25

## 2021-01-01 RX ADMIN — PIPERACILLIN AND TAZOBACTAM 3375 MG: 3; .375 INJECTION, POWDER, LYOPHILIZED, FOR SOLUTION INTRAVENOUS at 20:45

## 2021-01-01 RX ADMIN — DOCUSATE SODIUM 50 MG AND SENNOSIDES 8.6 MG 1 TABLET: 8.6; 5 TABLET, FILM COATED ORAL at 08:46

## 2021-01-01 RX ADMIN — CEFAZOLIN 2000 MG: 1 INJECTION, POWDER, FOR SOLUTION INTRAMUSCULAR; INTRAVENOUS at 11:57

## 2021-01-01 RX ADMIN — SODIUM CHLORIDE: 9 INJECTION, SOLUTION INTRAVENOUS at 06:59

## 2021-01-01 RX ADMIN — ENOXAPARIN SODIUM 40 MG: 100 INJECTION SUBCUTANEOUS at 08:40

## 2021-01-01 RX ADMIN — PIPERACILLIN AND TAZOBACTAM 3375 MG: 3; .375 INJECTION, POWDER, LYOPHILIZED, FOR SOLUTION INTRAVENOUS at 09:16

## 2021-01-01 RX ADMIN — FUROSEMIDE 20 MG: 20 INJECTION, SOLUTION INTRAMUSCULAR; INTRAVENOUS at 11:35

## 2021-01-01 RX ADMIN — ENOXAPARIN SODIUM 40 MG: 100 INJECTION SUBCUTANEOUS at 09:09

## 2021-01-01 RX ADMIN — DOCUSATE SODIUM 50 MG AND SENNOSIDES 8.6 MG 1 TABLET: 8.6; 5 TABLET, FILM COATED ORAL at 09:55

## 2021-01-01 RX ADMIN — Medication 10 ML: at 21:03

## 2021-01-01 RX ADMIN — MORPHINE SULFATE 2 MG: 2 INJECTION, SOLUTION INTRAMUSCULAR; INTRAVENOUS at 20:46

## 2021-01-01 RX ADMIN — Medication 3 MG: at 12:31

## 2021-01-01 RX ADMIN — SODIUM CHLORIDE: 9 INJECTION, SOLUTION INTRAVENOUS at 16:45

## 2021-01-01 RX ADMIN — Medication 2000 MG: at 21:35

## 2021-01-01 RX ADMIN — SODIUM CHLORIDE, POTASSIUM CHLORIDE, SODIUM LACTATE AND CALCIUM CHLORIDE: 600; 310; 30; 20 INJECTION, SOLUTION INTRAVENOUS at 06:38

## 2021-01-01 RX ADMIN — Medication 10 ML: at 21:35

## 2021-01-01 RX ADMIN — LIDOCAINE HYDROCHLORIDE 100 MG: 20 INJECTION, SOLUTION INTRAVENOUS at 11:41

## 2021-01-01 RX ADMIN — POTASSIUM CHLORIDE 10 MEQ: 10 INJECTION, SOLUTION INTRAVENOUS at 13:00

## 2021-01-01 RX ADMIN — SODIUM CHLORIDE: 9 INJECTION, SOLUTION INTRAVENOUS at 15:45

## 2021-01-01 RX ADMIN — SODIUM CHLORIDE 25 ML: 9 INJECTION, SOLUTION INTRAVENOUS at 06:27

## 2021-01-01 RX ADMIN — PIPERACILLIN AND TAZOBACTAM 3375 MG: 3; .375 INJECTION, POWDER, LYOPHILIZED, FOR SOLUTION INTRAVENOUS at 16:05

## 2021-01-01 RX ADMIN — PANTOPRAZOLE SODIUM 40 MG: 40 TABLET, DELAYED RELEASE ORAL at 08:46

## 2021-01-01 RX ADMIN — CARBIDOPA AND LEVODOPA 1 TABLET: 25; 100 TABLET ORAL at 13:57

## 2021-01-01 RX ADMIN — IOPAMIDOL 75 ML: 755 INJECTION, SOLUTION INTRAVENOUS at 18:02

## 2021-01-01 RX ADMIN — PRIMIDONE 50 MG: 50 TABLET ORAL at 08:46

## 2021-01-01 RX ADMIN — Medication 10 ML: at 21:06

## 2021-01-01 RX ADMIN — ENOXAPARIN SODIUM 40 MG: 100 INJECTION SUBCUTANEOUS at 08:49

## 2021-01-01 RX ADMIN — PRIMIDONE 50 MG: 50 TABLET ORAL at 21:05

## 2021-01-01 RX ADMIN — BARIUM SULFATE 15 ML: 400 PASTE ORAL at 11:03

## 2021-01-01 RX ADMIN — PRIMIDONE 50 MG: 50 TABLET ORAL at 09:56

## 2021-01-01 RX ADMIN — ACETAMINOPHEN 650 MG: 325 TABLET ORAL at 16:50

## 2021-01-01 RX ADMIN — SODIUM CHLORIDE 25 ML: 9 INJECTION, SOLUTION INTRAVENOUS at 20:31

## 2021-01-01 RX ADMIN — WATER: 1 INJECTION INTRAMUSCULAR; INTRAVENOUS; SUBCUTANEOUS at 19:18

## 2021-01-01 RX ADMIN — SERTRALINE 25 MG: 50 TABLET, FILM COATED ORAL at 08:46

## 2021-01-01 RX ADMIN — FENTANYL CITRATE 100 MCG: 50 INJECTION, SOLUTION INTRAMUSCULAR; INTRAVENOUS at 11:41

## 2021-01-01 RX ADMIN — Medication 10 ML: at 08:49

## 2021-01-01 RX ADMIN — ACETAMINOPHEN 650 MG: 325 TABLET ORAL at 22:24

## 2021-01-01 RX ADMIN — PIPERACILLIN AND TAZOBACTAM 3375 MG: 3; .375 INJECTION, POWDER, LYOPHILIZED, FOR SOLUTION INTRAVENOUS at 16:27

## 2021-01-01 RX ADMIN — PROPOFOL 20 MG: 10 INJECTION, EMULSION INTRAVENOUS at 17:01

## 2021-01-01 RX ADMIN — PIPERACILLIN AND TAZOBACTAM 3375 MG: 3; .375 INJECTION, POWDER, LYOPHILIZED, FOR SOLUTION INTRAVENOUS at 00:43

## 2021-01-01 RX ADMIN — ACETAMINOPHEN ORAL SOLUTION 650 MG: 650 SOLUTION ORAL at 05:15

## 2021-01-01 RX ADMIN — DEXTROSE MONOHYDRATE: 100 INJECTION, SOLUTION INTRAVENOUS at 12:42

## 2021-01-01 RX ADMIN — ROCURONIUM BROMIDE 40 MG: 10 INJECTION, SOLUTION INTRAVENOUS at 11:41

## 2021-01-01 RX ADMIN — SODIUM CHLORIDE, PRESERVATIVE FREE 10 ML: 5 INJECTION INTRAVENOUS at 04:46

## 2021-01-01 RX ADMIN — ENOXAPARIN SODIUM 40 MG: 100 INJECTION SUBCUTANEOUS at 11:52

## 2021-01-01 RX ADMIN — Medication 10 ML: at 08:47

## 2021-01-01 RX ADMIN — PIPERACILLIN AND TAZOBACTAM 3375 MG: 3; .375 INJECTION, POWDER, LYOPHILIZED, FOR SOLUTION INTRAVENOUS at 00:42

## 2021-01-01 RX ADMIN — Medication 10 ML: at 20:28

## 2021-01-01 RX ADMIN — SODIUM CHLORIDE, POTASSIUM CHLORIDE, SODIUM LACTATE AND CALCIUM CHLORIDE: 600; 310; 30; 20 INJECTION, SOLUTION INTRAVENOUS at 11:51

## 2021-01-01 RX ADMIN — PIPERACILLIN AND TAZOBACTAM 3375 MG: 3; .375 INJECTION, POWDER, LYOPHILIZED, FOR SOLUTION INTRAVENOUS at 12:00

## 2021-01-01 RX ADMIN — PRIMIDONE 50 MG: 50 TABLET ORAL at 18:01

## 2021-01-01 RX ADMIN — PROPOFOL 5 MG: 10 INJECTION, EMULSION INTRAVENOUS at 11:41

## 2021-01-01 RX ADMIN — Medication 10 ML: at 13:07

## 2021-01-01 RX ADMIN — PIPERACILLIN AND TAZOBACTAM 3375 MG: 3; .375 INJECTION, POWDER, LYOPHILIZED, FOR SOLUTION INTRAVENOUS at 05:20

## 2021-01-01 RX ADMIN — CARBIDOPA AND LEVODOPA 1 TABLET: 25; 100 TABLET ORAL at 09:56

## 2021-01-01 RX ADMIN — POTASSIUM CHLORIDE, DEXTROSE MONOHYDRATE AND SODIUM CHLORIDE: 150; 5; 450 INJECTION, SOLUTION INTRAVENOUS at 10:10

## 2021-01-01 RX ADMIN — PIPERACILLIN AND TAZOBACTAM 3375 MG: 3; .375 INJECTION, POWDER, LYOPHILIZED, FOR SOLUTION INTRAVENOUS at 11:35

## 2021-01-01 RX ADMIN — Medication 10 ML: at 09:10

## 2021-01-01 RX ADMIN — SODIUM CHLORIDE 25 ML: 9 INJECTION, SOLUTION INTRAVENOUS at 20:02

## 2021-01-01 ASSESSMENT — PULMONARY FUNCTION TESTS
PIF_VALUE: 2
PIF_VALUE: 17
PIF_VALUE: 0
PIF_VALUE: 21
PIF_VALUE: 2
PIF_VALUE: 16
PIF_VALUE: 18
PIF_VALUE: 19
PIF_VALUE: 16
PIF_VALUE: 20
PIF_VALUE: 18
PIF_VALUE: 3
PIF_VALUE: 19
PIF_VALUE: 2
PIF_VALUE: 17
PIF_VALUE: 20
PIF_VALUE: 17
PIF_VALUE: 17
PIF_VALUE: 2
PIF_VALUE: 2
PIF_VALUE: 19
PIF_VALUE: 18
PIF_VALUE: 2
PIF_VALUE: 2
PIF_VALUE: 3
PIF_VALUE: 16
PIF_VALUE: 17
PIF_VALUE: 19
PIF_VALUE: 19
PIF_VALUE: 16
PIF_VALUE: 2
PIF_VALUE: 18
PIF_VALUE: 17
PIF_VALUE: 2
PIF_VALUE: 2
PIF_VALUE: 15
PIF_VALUE: 19
PIF_VALUE: 19
PIF_VALUE: 15
PIF_VALUE: 17
PIF_VALUE: 18
PIF_VALUE: 19
PIF_VALUE: 20
PIF_VALUE: 17
PIF_VALUE: 17
PIF_VALUE: 0
PIF_VALUE: 12
PIF_VALUE: 19
PIF_VALUE: 1
PIF_VALUE: 15
PIF_VALUE: 19
PIF_VALUE: 19
PIF_VALUE: 4
PIF_VALUE: 3
PIF_VALUE: 9
PIF_VALUE: 14
PIF_VALUE: 19
PIF_VALUE: 2
PIF_VALUE: 2
PIF_VALUE: 19
PIF_VALUE: 16
PIF_VALUE: 17
PIF_VALUE: 1
PIF_VALUE: 2
PIF_VALUE: 19
PIF_VALUE: 19
PIF_VALUE: 17
PIF_VALUE: 2
PIF_VALUE: 0
PIF_VALUE: 18
PIF_VALUE: 15
PIF_VALUE: 17
PIF_VALUE: 15
PIF_VALUE: 17
PIF_VALUE: 2
PIF_VALUE: 17
PIF_VALUE: 19

## 2021-01-01 ASSESSMENT — PAIN SCALES - GENERAL
PAINLEVEL_OUTOF10: 0
PAINLEVEL_OUTOF10: 10
PAINLEVEL_OUTOF10: 0
PAINLEVEL_OUTOF10: 7
PAINLEVEL_OUTOF10: 0
PAINLEVEL_OUTOF10: 4
PAINLEVEL_OUTOF10: 0
PAINLEVEL_OUTOF10: 7
PAINLEVEL_OUTOF10: 4
PAINLEVEL_OUTOF10: 0
PAINLEVEL_OUTOF10: 4
PAINLEVEL_OUTOF10: 0

## 2021-01-01 ASSESSMENT — PAIN SCALES - PAIN ASSESSMENT IN ADVANCED DEMENTIA (PAINAD)
BREATHING: 0
BODYLANGUAGE: 1
CONSOLABILITY: 1
BODYLANGUAGE: 1
FACIALEXPRESSION: 0
NEGVOCALIZATION: 0
NEGVOCALIZATION: 0
TOTALSCORE: 2
FACIALEXPRESSION: 0
BREATHING: 0
TOTALSCORE: 2
BREATHING: 0
FACIALEXPRESSION: 0
TOTALSCORE: 2
CONSOLABILITY: 1
NEGVOCALIZATION: 0
BODYLANGUAGE: 1
CONSOLABILITY: 1

## 2021-01-01 ASSESSMENT — PAIN DESCRIPTION - FREQUENCY: FREQUENCY: CONTINUOUS

## 2021-01-01 ASSESSMENT — PAIN DESCRIPTION - DESCRIPTORS: DESCRIPTORS: PATIENT UNABLE TO DESCRIBE

## 2021-01-01 ASSESSMENT — PAIN DESCRIPTION - PROGRESSION: CLINICAL_PROGRESSION: NOT CHANGED

## 2021-01-01 ASSESSMENT — PAIN DESCRIPTION - PAIN TYPE: TYPE: ACUTE PAIN

## 2021-01-01 ASSESSMENT — PAIN DESCRIPTION - LOCATION: LOCATION: LEG

## 2021-01-01 ASSESSMENT — PAIN DESCRIPTION - ONSET: ONSET: SUDDEN

## 2021-01-01 ASSESSMENT — PAIN SCALES - WONG BAKER: WONGBAKER_NUMERICALRESPONSE: 0

## 2021-01-01 ASSESSMENT — PAIN DESCRIPTION - ORIENTATION: ORIENTATION: LEFT

## 2021-02-18 LAB
AVERAGE GLUCOSE: NORMAL
HBA1C MFR BLD: 5 %
IRON: NORMAL
TOTAL IRON BINDING CAPACITY: NORMAL
VITAMIN B-12: 609

## 2021-06-07 ENCOUNTER — HOSPITAL ENCOUNTER (EMERGENCY)
Age: 86
Discharge: HOME | End: 2021-06-07
Payer: OTHER GOVERNMENT

## 2021-06-07 ENCOUNTER — HOSPITAL ENCOUNTER (EMERGENCY)
Age: 86
Discharge: HOME | End: 2021-06-08
Attending: EMERGENCY MEDICINE
Payer: MEDICARE

## 2021-06-07 ENCOUNTER — HOSPITAL ENCOUNTER (OUTPATIENT)
Age: 86
Discharge: HOME | End: 2021-06-09
Payer: OTHER GOVERNMENT

## 2021-06-07 ENCOUNTER — HOSPITAL ENCOUNTER (OUTPATIENT)
Dept: GENERAL RADIOLOGY | Age: 86
Discharge: HOME | End: 2021-06-09
Payer: OTHER GOVERNMENT

## 2021-06-07 VITALS
DIASTOLIC BLOOD PRESSURE: 72 MMHG | HEIGHT: 63 IN | WEIGHT: 135 LBS | OXYGEN SATURATION: 93 % | BODY MASS INDEX: 23.92 KG/M2 | SYSTOLIC BLOOD PRESSURE: 132 MMHG | HEART RATE: 88 BPM | RESPIRATION RATE: 21 BRPM | TEMPERATURE: 98.4 F

## 2021-06-07 VITALS
RESPIRATION RATE: 20 BRPM | SYSTOLIC BLOOD PRESSURE: 158 MMHG | WEIGHT: 135 LBS | OXYGEN SATURATION: 93 % | HEIGHT: 62 IN | HEART RATE: 69 BPM | TEMPERATURE: 97.6 F | BODY MASS INDEX: 24.84 KG/M2 | DIASTOLIC BLOOD PRESSURE: 90 MMHG

## 2021-06-07 DIAGNOSIS — T17.908A ASPIRATION INTO AIRWAY, INITIAL ENCOUNTER: ICD-10-CM

## 2021-06-07 DIAGNOSIS — J18.9 PNEUMONIA OF RIGHT LOWER LOBE DUE TO INFECTIOUS ORGANISM: Primary | ICD-10-CM

## 2021-06-07 LAB
ALBUMIN SERPL-MCNC: 3.9 G/DL (ref 3.5–5.2)
ALP BLD-CCNC: 115 U/L (ref 40–129)
ALT SERPL-CCNC: 12 U/L (ref 0–40)
ANION GAP SERPL CALCULATED.3IONS-SCNC: 11 MMOL/L (ref 7–16)
AST SERPL-CCNC: 22 U/L (ref 0–39)
BASOPHILS ABSOLUTE: 0.01 E9/L (ref 0–0.2)
BASOPHILS RELATIVE PERCENT: 0.2 % (ref 0–2)
BILIRUB SERPL-MCNC: 0.3 MG/DL (ref 0–1.2)
BUN BLDV-MCNC: 35 MG/DL (ref 6–23)
CALCIUM SERPL-MCNC: 8.4 MG/DL (ref 8.6–10.2)
CHLORIDE BLD-SCNC: 103 MMOL/L (ref 98–107)
CO2: 22 MMOL/L (ref 22–29)
CREAT SERPL-MCNC: 1.1 MG/DL (ref 0.7–1.2)
EOSINOPHILS ABSOLUTE: 0.03 E9/L (ref 0.05–0.5)
EOSINOPHILS RELATIVE PERCENT: 0.6 % (ref 0–6)
GFR AFRICAN AMERICAN: >60
GFR NON-AFRICAN AMERICAN: >60 ML/MIN/1.73
GLUCOSE BLD-MCNC: 130 MG/DL (ref 74–99)
HCT VFR BLD CALC: 25.9 % (ref 37–54)
HEMOGLOBIN: 8 G/DL (ref 12.5–16.5)
IMMATURE GRANULOCYTES #: 0.02 E9/L
IMMATURE GRANULOCYTES %: 0.4 % (ref 0–5)
LACTIC ACID: 1.3 MMOL/L (ref 0.5–2.2)
LYMPHOCYTES ABSOLUTE: 1.5 E9/L (ref 1.5–4)
LYMPHOCYTES RELATIVE PERCENT: 28.7 % (ref 20–42)
MCH RBC QN AUTO: 30.9 PG (ref 26–35)
MCHC RBC AUTO-ENTMCNC: 30.9 % (ref 32–34.5)
MCV RBC AUTO: 100 FL (ref 80–99.9)
MONOCYTES ABSOLUTE: 0.46 E9/L (ref 0.1–0.95)
MONOCYTES RELATIVE PERCENT: 8.8 % (ref 2–12)
NEUTROPHILS ABSOLUTE: 3.21 E9/L (ref 1.8–7.3)
NEUTROPHILS RELATIVE PERCENT: 61.3 % (ref 43–80)
PDW BLD-RTO: 13.5 FL (ref 11.5–15)
PLATELET # BLD: 112 E9/L (ref 130–450)
PMV BLD AUTO: 11 FL (ref 7–12)
POTASSIUM REFLEX MAGNESIUM: 4.4 MMOL/L (ref 3.5–5)
PRO-BNP: 481 PG/ML (ref 0–450)
RBC # BLD: 2.59 E12/L (ref 3.8–5.8)
SARS-COV-2, NAAT: NOT DETECTED
SODIUM BLD-SCNC: 136 MMOL/L (ref 132–146)
TOTAL PROTEIN: 7.6 G/DL (ref 6.4–8.3)
TROPONIN, HIGH SENSITIVITY: 17 NG/L (ref 0–11)
WBC # BLD: 5.2 E9/L (ref 4.5–11.5)

## 2021-06-07 PROCEDURE — 99282 EMERGENCY DEPT VISIT SF MDM: CPT

## 2021-06-07 PROCEDURE — 87040 BLOOD CULTURE FOR BACTERIA: CPT

## 2021-06-07 PROCEDURE — 80053 COMPREHEN METABOLIC PANEL: CPT

## 2021-06-07 PROCEDURE — 83880 ASSAY OF NATRIURETIC PEPTIDE: CPT

## 2021-06-07 PROCEDURE — 87635 SARS-COV-2 COVID-19 AMP PRB: CPT

## 2021-06-07 PROCEDURE — 71046 X-RAY EXAM CHEST 2 VIEWS: CPT

## 2021-06-07 PROCEDURE — 85025 COMPLETE CBC W/AUTO DIFF WBC: CPT

## 2021-06-07 PROCEDURE — 93005 ELECTROCARDIOGRAM TRACING: CPT | Performed by: STUDENT IN AN ORGANIZED HEALTH CARE EDUCATION/TRAINING PROGRAM

## 2021-06-07 PROCEDURE — 83605 ASSAY OF LACTIC ACID: CPT

## 2021-06-07 PROCEDURE — 84484 ASSAY OF TROPONIN QUANT: CPT

## 2021-06-07 RX ORDER — SERTRALINE HYDROCHLORIDE 25 MG/1
25 TABLET, FILM COATED ORAL DAILY
COMMUNITY

## 2021-06-07 NOTE — ED NOTES
FIRST PROVIDER CONTACT ASSESSMENT NOTE      Department of Emergency Medicine   Admit Date: No admission date for patient encounter.     Chief Complaint: Pneumonia (Sent here by PCP for x-ray that was done today that showed pneumonia. )      History of Present Illness:    Marga Brennan is a 80 y.o. male who presents to the ED for OP XR done today that showed pneumonia.         -----------------END OF FIRST PROVIDER CONTACT ASSESSMENT NOTE--------------  Electronically signed by Carlton Vo PA-C   DD: 6/7/21               Carlton Vo PA-C  06/07/21 175

## 2021-06-08 LAB
EKG ATRIAL RATE: 69 BPM
EKG P AXIS: 82 DEGREES
EKG P-R INTERVAL: 186 MS
EKG Q-T INTERVAL: 450 MS
EKG QRS DURATION: 148 MS
EKG QTC CALCULATION (BAZETT): 482 MS
EKG R AXIS: 55 DEGREES
EKG T AXIS: 34 DEGREES
EKG VENTRICULAR RATE: 69 BPM
TROPONIN, HIGH SENSITIVITY: 18 NG/L (ref 0–11)

## 2021-06-08 PROCEDURE — 93010 ELECTROCARDIOGRAM REPORT: CPT | Performed by: INTERNAL MEDICINE

## 2021-06-08 PROCEDURE — 36415 COLL VENOUS BLD VENIPUNCTURE: CPT

## 2021-06-08 PROCEDURE — 84484 ASSAY OF TROPONIN QUANT: CPT

## 2021-06-08 RX ORDER — DOXYCYCLINE HYCLATE 100 MG
100 TABLET ORAL 2 TIMES DAILY
Qty: 20 TABLET | Refills: 0 | Status: SHIPPED | OUTPATIENT
Start: 2021-06-08 | End: 2021-06-18

## 2021-06-08 RX ORDER — DOXYCYCLINE HYCLATE 100 MG/1
100 CAPSULE ORAL ONCE
Status: DISCONTINUED | OUTPATIENT
Start: 2021-06-08 | End: 2021-06-08

## 2021-06-08 RX ORDER — CEFDINIR 300 MG/1
300 CAPSULE ORAL 2 TIMES DAILY
Qty: 20 CAPSULE | Refills: 0 | Status: SHIPPED | OUTPATIENT
Start: 2021-06-08 | End: 2021-06-18

## 2021-06-08 RX ORDER — CEFDINIR 300 MG/1
300 CAPSULE ORAL ONCE
Status: DISCONTINUED | OUTPATIENT
Start: 2021-06-08 | End: 2021-06-08

## 2021-06-08 NOTE — ED NOTES
Ambulated patient and pulse ox stayed between 90-95%. Patient tolerated well.       Semaj Espinoza RN  06/07/21 [id number]

## 2021-06-08 NOTE — ED PROVIDER NOTES
Department of Emergency Medicine   ED  Provider Note  Admit Date/RoomTime: 6/7/2021  9:44 PM  ED Room: 06/06          History of Present Illness:  6/7/21, Time: 10:18 PM EDT  Chief Complaint   Patient presents with    Other     Abnormal x-ray, dx rk, Dr. Lashon Knowles advised patient to be seen in ER. Patient here ealier but left. Porter Gudino is a 80 y.o. male presenting to the ED for cough, beginning several days ago. The complaint has been persistent, moderate in severity, and worsened by nothing. The patient is a 77-year-old male who presents to the emergency department complaining of a cough. Patient symptoms have been gradual in onset over the past several days, persistent, moderate severity, and nothing makes it better or worse. He has not taken anything at home for symptoms. He did call his family doctor, Dr. Shamika Guevara, who recommended him to come here for chest x-ray. They called and spoke with a nurse who recommended him to come here to be evaluated due to the patient having an abnormal chest x-ray. Chest x-ray did show questionable aspiration pneumonia versus atelectasis in the right lower lobe. The patient has been coughing a cough productive of brown sputum. He also has had intermittent fever and chills over the past several days. He lives at home with his daughter. He does have a history of dementia so history is mostly obtained from the daughter and review of systems is otherwise limited. However, the patient does deny any chest pain at this time. Review of Systems: Limited due to dementia        --------------------------------------------- PAST HISTORY ---------------------------------------------  Past Medical History:  has a past medical history of CAD (coronary artery disease), Cerebral artery occlusion with cerebral infarction ([facility] [address] Hemorrhage, Raynaud disease, and Tremors of nervous system.     Past Surgical History:  has a past surgical history that includes Coronary angioplasty with stent and Upper gastrointestinal endoscopy (N/A, 11/13/2019). Social History:  reports that he quit smoking about 45 years ago. He has never used smokeless tobacco. He reports previous alcohol use. He reports that he does not use drugs. Family History: family history is not on file. . Unless otherwise noted, family history is non contributory    The patients home medications have been reviewed. Allergies: Ativan [lorazepam], Clindamycin/lincomycin, and Pcn [penicillins]    I have reviewed the past medical history, past surgical history, social history, and family history    ---------------------------------------------------PHYSICAL EXAM--------------------------------------    Constitutional/General: Alert and oriented x3, chronically ill-appearing, but no acute distress  Head: Normocephalic and atraumatic  Eyes:  EOMI, sclera non icteric  Mouth: Oropharynx clear, handling secretions, no trismus, no asymmetry of the posterior oropharynx or uvular edema  Neck: Supple, full ROM, no stridor, no meningeal signs  Respiratory: Lungs are slightly diminished in the bilateral bases, no wheezing/rales/rhonchi  Cardiovascular:  Regular rate. Regular rhythm. No murmurs, no aortic murmurs, no gallops, or rubs. Chest: No chest wall tenderness  Gastrointestinal:  Abdomen Soft, Non tender, Non distended. No rebound, guarding, or rigidity. No pulsatile masses. Musculoskeletal: Moves all extremities x 4. Warm and well perfused, no clubbing, no cyanosis, no edema. Capillary refill <3 seconds  Skin: skin warm and dry. No rashes. Neurologic: GCS 15, no focal deficits, symmetric strength 5/5 in the upper and lower extremities bilaterally  Psychiatric: Normal Affect    -------------------------------------------------- RESULTS -------------------------------------------------  I have personally reviewed all laboratory and imaging results for this patient. Results are listed below.      LABS: (Lab results interpreted by me)  Results for orders placed or performed during the hospital encounter of 06/07/21   COVID-19, Rapid    Specimen: Nasopharyngeal Swab   Result Value Ref Range    SARS-CoV-2, NAAT Not Detected Not Detected   CBC auto differential   Result Value Ref Range    WBC 5.2 4.5 - 11.5 E9/L    RBC 2.59 (L) 3.80 - 5.80 E12/L    Hemoglobin 8.0 (L) 12.5 - 16.5 g/dL    Hematocrit 25.9 (L) 37.0 - 54.0 %    .0 (H) 80.0 - 99.9 fL    MCH 30.9 26.0 - 35.0 pg    MCHC 30.9 (L) 32.0 - 34.5 %    RDW 13.5 11.5 - 15.0 fL    Platelets 961 (L) 864 - 450 E9/L    MPV 11.0 7.0 - 12.0 fL    Neutrophils % 61.3 43.0 - 80.0 %    Immature Granulocytes % 0.4 0.0 - 5.0 %    Lymphocytes % 28.7 20.0 - 42.0 %    Monocytes % 8.8 2.0 - 12.0 %    Eosinophils % 0.6 0.0 - 6.0 %    Basophils % 0.2 0.0 - 2.0 %    Neutrophils Absolute 3.21 1.80 - 7.30 E9/L    Immature Granulocytes # 0.02 E9/L    Lymphocytes Absolute 1.50 1.50 - 4.00 E9/L    Monocytes Absolute 0.46 0.10 - 0.95 E9/L    Eosinophils Absolute 0.03 (L) 0.05 - 0.50 E9/L    Basophils Absolute 0.01 0.00 - 0.20 E9/L   Comprehensive Metabolic Panel w/ Reflex to MG   Result Value Ref Range    Sodium 136 132 - 146 mmol/L    Potassium reflex Magnesium 4.4 3.5 - 5.0 mmol/L    Chloride 103 98 - 107 mmol/L    CO2 22 22 - 29 mmol/L    Anion Gap 11 7 - 16 mmol/L    Glucose 130 (H) 74 - 99 mg/dL    BUN 35 (H) 6 - 23 mg/dL    CREATININE 1.1 0.7 - 1.2 mg/dL    GFR Non-African American >60 >=60 mL/min/1.73    GFR African American >60     Calcium 8.4 (L) 8.6 - 10.2 mg/dL    Total Protein 7.6 6.4 - 8.3 g/dL    Albumin 3.9 3.5 - 5.2 g/dL    Total Bilirubin 0.3 0.0 - 1.2 mg/dL    Alkaline Phosphatase 115 40 - 129 U/L    ALT 12 0 - 40 U/L    AST 22 0 - 39 U/L   Troponin   Result Value Ref Range    Troponin, High Sensitivity 17 (H) 0 - 11 ng/L   Brain Natriuretic Peptide   Result Value Ref Range    Pro- (H) 0 - 450 pg/mL   Lactic Acid, Plasma   Result Value Ref Range    Lactic Acid 1.3 0.5 - 2.2 mmol/L   Troponin   Result Value Ref Range    Troponin, High Sensitivity 18 (H) 0 - 11 ng/L   EKG 12 Lead   Result Value Ref Range    Ventricular Rate 69 BPM    Atrial Rate 69 BPM    P-R Interval 186 ms    QRS Duration 148 ms    Q-T Interval 450 ms    QTc Calculation (Bazett) 482 ms    P Axis 82 degrees    R Axis 55 degrees    T Axis 34 degrees   ,       RADIOLOGY:  Interpreted by Radiologist unless otherwise specified  No orders to display         EKG Interpretation  Interpreted by Joby Gambino DO    EKG: This EKG is signed and interpreted by me. Rate: 69  Rhythm: Sinus  Interpretation: Normal sinus rhythm with PACs, right bundle branch block, normal axis, no acute ST elevations or depressions, intervals within normal limits, QTC is 482  Comparison: stable as compared to patient's most recent EKG     ------------------------- NURSING NOTES AND VITALS REVIEWED ---------------------------   The nursing notes within the ED encounter and vital signs as below have been reviewed by myself  /72   Pulse 88   Temp 98.4 °F (36.9 °C) (Oral)   Resp 21   Ht 5' 3\" (1.6 m)   Wt 135 lb (61.2 kg)   SpO2 93%   BMI 23.91 kg/m²     Oxygen Saturation Interpretation: 93 % on room air. Normal    The patients available past medical records and past encounters were reviewed. ------------------------------ ED COURSE/MEDICAL DECISION MAKING----------------------  Medications - No data to display        The cardiac monitor revealed NSR with a heart rate in the 80s as interpreted by me. The cardiac monitor was ordered secondary to the patient's cough and to monitor the patient for dysrhythmia. CPT 95415           Medical Decision Making:     The patient was seen and evaluated by the Attending Emergency Medicine Physician Dr. Jasmeet Wellington.      The patient is a 70-year-old male presents to the emergency department complaining of a cough.   He is hemodynamically stable, nontoxic in appearance, and in no acute distress. Chest x-ray shows questionable atelectasis versus possible aspiration pneumonia. Labs were obtained and within normal limits. Patient was saturating in the 90s on room air at rest and with ambulation was not in any respiratory distress and his pulse ox remained above 90% the entire time. I discussed results in depth with daughter at bedside and recommended trial of outpatient antibiotics with close follow-up with her family doctor. Patient is to return here for any worsening symptoms or other acute symptoms or concerns. Patient's daughter verbalized understanding agreement to treatment plan discharge instructions. Re-Evaluations:  ED Course as of Jun 08 0126   Mon Jun 07, 2021   2224 ATTENDING PROVIDER ATTESTATION:     I have personally performed and/or participated in the history, exam, medical decision making, and procedures and agree with all pertinent clinical information unless otherwise noted. I have also reviewed and agree with the past medical, family and social history unless otherwise noted. I have discussed this patient in detail with the resident, and provided the instruction and education regarding patient here with family, sent in by his family doctor for evaluation. Complaining of about 7 to 10 days of moist cough. No shortness of breath. No fevers. No particular runny nose. No new leg pain or swelling. No lightheadedness or syncope. .  My findings/plan: Patient sitting the bed in no distress. Trace scattered wheezing. No respiratory distress. Moist cough. Heart rate regular. Abdomen soft and nontender. Mild bilateral ankle and foot edema with no mid or proximal pretibial edema and no calf pain. No unilateral leg swelling. No jaundice or icterus. Awake and alert and oriented  with no focal neurologic deficit. [NC]   Tue Jun 08, 2021   0041 Patient is in no acute distress and resting comfortably. He is saturating at 95% on room air.

## 2021-06-13 ENCOUNTER — HOSPITAL ENCOUNTER (EMERGENCY)
Age: 86
Discharge: HOME | End: 2021-06-13
Attending: EMERGENCY MEDICINE
Payer: MEDICARE

## 2021-06-13 VITALS
HEIGHT: 66 IN | RESPIRATION RATE: 18 BRPM | WEIGHT: 135 LBS | TEMPERATURE: 97.8 F | HEART RATE: 67 BPM | DIASTOLIC BLOOD PRESSURE: 73 MMHG | SYSTOLIC BLOOD PRESSURE: 137 MMHG | OXYGEN SATURATION: 98 % | BODY MASS INDEX: 21.69 KG/M2

## 2021-06-13 DIAGNOSIS — L98.9 SKIN PROBLEM: Primary | ICD-10-CM

## 2021-06-13 LAB
BLOOD CULTURE, ROUTINE: NORMAL
CULTURE, BLOOD 2: NORMAL

## 2021-06-13 PROCEDURE — 99282 EMERGENCY DEPT VISIT SF MDM: CPT

## 2021-06-13 ASSESSMENT — ENCOUNTER SYMPTOMS
EYE PAIN: 0
ABDOMINAL PAIN: 0
VOMITING: 0
BACK PAIN: 0
SHORTNESS OF BREATH: 0
EYE INFLAMMATION: 0
SORE THROAT: 0
EYE DISCHARGE: 0
EYE REDNESS: 0
DIARRHEA: 0
EYE WATERING: 0
ALLERGIC/IMMUNOLOGIC NEGATIVE: 1
COUGH: 0

## 2021-06-13 NOTE — ED PROVIDER NOTES
Appearance: He is well-developed. He is not ill-appearing. HENT:      Head: Normocephalic. Right Ear: External ear normal.      Left Ear: External ear normal.      Nose: No rhinorrhea. Mouth/Throat:      Mouth: Mucous membranes are moist.      Pharynx: Oropharynx is clear. Eyes:      General:         Right eye: No discharge. Left eye: No discharge. Extraocular Movements: Extraocular movements intact. Conjunctiva/sclera: Conjunctivae normal.      Pupils: Pupils are equal, round, and reactive to light. Cardiovascular:      Rate and Rhythm: Normal rate and regular rhythm. Heart sounds: Normal heart sounds. No murmur heard. Pulmonary:      Effort: Pulmonary effort is normal. No respiratory distress. Breath sounds: Normal breath sounds. Abdominal:      General: Bowel sounds are normal.      Palpations: Abdomen is soft. Tenderness: There is no abdominal tenderness. Musculoskeletal:      Cervical back: Neck supple. No muscular tenderness. Skin:     General: Skin is warm and dry. Neurological:      Mental Status: He is alert. Cranial Nerves: No cranial nerve deficit. Procedures         MDM  Number of Diagnoses or Management Options  Skin problem  Diagnosis management comments: Patient presented to emergency department after accidentally putting some light off solution used to white out lettering on paper but it the material on his eyelids bilaterally. There is no eye irritation extraocular movements intact patient had no redness erythema of the eyes. He had no pain on the eyes and was at baseline mental status.   The material was removed from the patient's eyelid much as was tolerated well in the urgent care and patient will be discharged with instructions to follow-up with her primary care provider next 3 to 4 days for evaluation.            --------------------------------------------- PAST HISTORY ---------------------------------------------  Past Medical History:  has a past medical history of CAD (coronary artery disease), Cerebral artery occlusion with cerebral infarction ([facility] [address] Hemorrhage, Raynaud disease, and Tremors of nervous system. Past Surgical History:  has a past surgical history that includes Coronary angioplasty with stent and Upper gastrointestinal endoscopy (N/A, 11/13/2019). Social History:  reports that he quit smoking about 45 years ago. He has never used smokeless tobacco. He reports previous alcohol use. He reports that he does not use drugs. Family History: family history is not on file. The patients home medications have been reviewed. Allergies: Ativan [lorazepam], Clindamycin/lincomycin, and Pcn [penicillins]    -------------------------------------------------- RESULTS -------------------------------------------------  Labs:  No results found for this visit on 06/13/21. Radiology:  No orders to display       ------------------------- NURSING NOTES AND VITALS REVIEWED ---------------------------  Date / Time Roomed:  6/13/2021 11:11 AM  ED Bed Assignment:  04/04    The nursing notes within the ED encounter and vital signs as below have been reviewed. /73   Pulse 67   Temp 97.8 °F (36.6 °C) (Temporal)   Resp 18   Ht 5' 6\" (1.676 m)   Wt 135 lb (61.2 kg)   SpO2 98%   BMI 21.79 kg/m²   Oxygen Saturation Interpretation: Normal      ------------------------------------------ PROGRESS NOTES ------------------------------------------  1:06 PM EDT  I have spoken with the Patient and/or Family and discussed todays results, in addition to providing specific details for the plan of care and counseling regarding the diagnosis and prognosis. Labs and Imaging discussed with patient including appropriate follow- up and re-evaluation. Their questions are answered at this time and they are agreeable with the plan.  I discussed at length with them reasons for

## 2021-08-13 ENCOUNTER — HOSPITAL ENCOUNTER (OUTPATIENT)
Age: 86
Setting detail: OBSERVATION
Discharge: HOME | End: 2021-08-14
Attending: EMERGENCY MEDICINE | Admitting: INTERNAL MEDICINE
Payer: MEDICARE

## 2021-08-13 ENCOUNTER — APPOINTMENT (OUTPATIENT)
Dept: CT IMAGING | Age: 86
End: 2021-08-13
Payer: MEDICARE

## 2021-08-13 ENCOUNTER — APPOINTMENT (OUTPATIENT)
Dept: GENERAL RADIOLOGY | Age: 86
End: 2021-08-13
Payer: MEDICARE

## 2021-08-13 DIAGNOSIS — K92.2 GASTROINTESTINAL HEMORRHAGE, UNSPECIFIED GASTROINTESTINAL HEMORRHAGE TYPE: Primary | ICD-10-CM

## 2021-08-13 LAB
ABO/RH: NORMAL
ACANTHOCYTES: ABNORMAL
ALBUMIN SERPL-MCNC: 3.7 G/DL (ref 3.5–5.2)
ALBUMIN SERPL-MCNC: 4.1 G/DL (ref 3.5–5.2)
ALP BLD-CCNC: 110 U/L (ref 40–129)
ALP BLD-CCNC: 119 U/L (ref 40–129)
ALT SERPL-CCNC: 8 U/L (ref 0–40)
ALT SERPL-CCNC: 9 U/L (ref 0–40)
ANION GAP SERPL CALCULATED.3IONS-SCNC: 7 MMOL/L (ref 7–16)
ANION GAP SERPL CALCULATED.3IONS-SCNC: 9 MMOL/L (ref 7–16)
ANTIBODY SCREEN: NORMAL
APTT: 23.3 SEC (ref 24.5–35.1)
AST SERPL-CCNC: 22 U/L (ref 0–39)
AST SERPL-CCNC: 22 U/L (ref 0–39)
BACTERIA: ABNORMAL /HPF
BASOPHILIC STIPPLING: ABNORMAL
BASOPHILS ABSOLUTE: 0 E9/L (ref 0–0.2)
BASOPHILS ABSOLUTE: 0 E9/L (ref 0–0.2)
BASOPHILS RELATIVE PERCENT: 0 % (ref 0–2)
BASOPHILS RELATIVE PERCENT: 0.1 % (ref 0–2)
BILIRUB SERPL-MCNC: 0.3 MG/DL (ref 0–1.2)
BILIRUB SERPL-MCNC: 0.3 MG/DL (ref 0–1.2)
BILIRUBIN URINE: NEGATIVE
BLOOD BANK DISPENSE STATUS: NORMAL
BLOOD BANK PRODUCT CODE: NORMAL
BLOOD, URINE: NEGATIVE
BPU ID: NORMAL
BUN BLDV-MCNC: 35 MG/DL (ref 6–23)
BUN BLDV-MCNC: 41 MG/DL (ref 6–23)
CALCIUM SERPL-MCNC: 8.3 MG/DL (ref 8.6–10.2)
CALCIUM SERPL-MCNC: 8.6 MG/DL (ref 8.6–10.2)
CHLORIDE BLD-SCNC: 104 MMOL/L (ref 98–107)
CHLORIDE BLD-SCNC: 105 MMOL/L (ref 98–107)
CHOLESTEROL, TOTAL: 121 MG/DL (ref 0–199)
CLARITY: CLEAR
CO2: 24 MMOL/L (ref 22–29)
CO2: 24 MMOL/L (ref 22–29)
COLOR: YELLOW
CREAT SERPL-MCNC: 1.1 MG/DL (ref 0.7–1.2)
CREAT SERPL-MCNC: 1.2 MG/DL (ref 0.7–1.2)
DESCRIPTION BLOOD BANK: NORMAL
EKG ATRIAL RATE: 77 BPM
EKG P AXIS: 68 DEGREES
EKG P-R INTERVAL: 192 MS
EKG Q-T INTERVAL: 422 MS
EKG QRS DURATION: 146 MS
EKG QTC CALCULATION (BAZETT): 477 MS
EKG R AXIS: 58 DEGREES
EKG T AXIS: 32 DEGREES
EKG VENTRICULAR RATE: 77 BPM
EOSINOPHILS ABSOLUTE: 0 E9/L (ref 0.05–0.5)
EOSINOPHILS ABSOLUTE: 0.01 E9/L (ref 0.05–0.5)
EOSINOPHILS RELATIVE PERCENT: 0.1 % (ref 0–6)
EOSINOPHILS RELATIVE PERCENT: 0.2 % (ref 0–6)
EPITHELIAL CELLS, UA: ABNORMAL /HPF
FOLATE: 17.2 NG/ML (ref 4.8–24.2)
GFR AFRICAN AMERICAN: >60
GFR AFRICAN AMERICAN: >60
GFR NON-AFRICAN AMERICAN: 56 ML/MIN/1.73
GFR NON-AFRICAN AMERICAN: >60 ML/MIN/1.73
GLUCOSE BLD-MCNC: 102 MG/DL (ref 74–99)
GLUCOSE BLD-MCNC: 114 MG/DL (ref 74–99)
GLUCOSE URINE: NEGATIVE MG/DL
HBA1C MFR BLD: 5.3 % (ref 4–5.6)
HCT VFR BLD CALC: 21.8 % (ref 37–54)
HCT VFR BLD CALC: 22.8 % (ref 37–54)
HCT VFR BLD CALC: 25.8 % (ref 37–54)
HDLC SERPL-MCNC: 59 MG/DL
HEMOGLOBIN: 6.8 G/DL (ref 12.5–16.5)
HEMOGLOBIN: 7.3 G/DL (ref 12.5–16.5)
HEMOGLOBIN: 8.3 G/DL (ref 12.5–16.5)
HYPOCHROMIA: ABNORMAL
IMMATURE GRANULOCYTES #: 0.03 E9/L
IMMATURE GRANULOCYTES %: 0.5 % (ref 0–5)
INR BLD: 1.1
IRON SATURATION: 23 % (ref 20–55)
IRON: 65 MCG/DL (ref 59–158)
KETONES, URINE: NEGATIVE MG/DL
LDL CHOLESTEROL CALCULATED: 53 MG/DL (ref 0–99)
LEUKOCYTE ESTERASE, URINE: NEGATIVE
LYMPHOCYTES ABSOLUTE: 0.53 E9/L (ref 1.5–4)
LYMPHOCYTES ABSOLUTE: 1.02 E9/L (ref 1.5–4)
LYMPHOCYTES RELATIVE PERCENT: 17.1 % (ref 20–42)
LYMPHOCYTES RELATIVE PERCENT: 5.2 % (ref 20–42)
MAGNESIUM: 2.6 MG/DL (ref 1.6–2.6)
MCH RBC QN AUTO: 30.1 PG (ref 26–35)
MCH RBC QN AUTO: 30.4 PG (ref 26–35)
MCHC RBC AUTO-ENTMCNC: 31.2 % (ref 32–34.5)
MCHC RBC AUTO-ENTMCNC: 32 % (ref 32–34.5)
MCV RBC AUTO: 95 FL (ref 80–99.9)
MCV RBC AUTO: 96.5 FL (ref 80–99.9)
MONOCYTES ABSOLUTE: 0.42 E9/L (ref 0.1–0.95)
MONOCYTES ABSOLUTE: 0.59 E9/L (ref 0.1–0.95)
MONOCYTES RELATIVE PERCENT: 3.5 % (ref 2–12)
MONOCYTES RELATIVE PERCENT: 9.9 % (ref 2–12)
NEUTROPHILS ABSOLUTE: 4.33 E9/L (ref 1.8–7.3)
NEUTROPHILS ABSOLUTE: 9.56 E9/L (ref 1.8–7.3)
NEUTROPHILS RELATIVE PERCENT: 72.3 % (ref 43–80)
NEUTROPHILS RELATIVE PERCENT: 91.3 % (ref 43–80)
NITRITE, URINE: NEGATIVE
OVALOCYTES: ABNORMAL
PDW BLD-RTO: 14.6 FL (ref 11.5–15)
PDW BLD-RTO: 14.8 FL (ref 11.5–15)
PH UA: 6 (ref 5–9)
PHOSPHORUS: 2.9 MG/DL (ref 2.5–4.5)
PLATELET # BLD: 103 E9/L (ref 130–450)
PLATELET # BLD: 94 E9/L (ref 130–450)
PLATELET CONFIRMATION: NORMAL
PMV BLD AUTO: 11.4 FL (ref 7–12)
PMV BLD AUTO: 11.4 FL (ref 7–12)
POIKILOCYTES: ABNORMAL
POLYCHROMASIA: ABNORMAL
POLYCHROMASIA: ABNORMAL
POTASSIUM REFLEX MAGNESIUM: 4.9 MMOL/L (ref 3.5–5)
POTASSIUM SERPL-SCNC: 4.5 MMOL/L (ref 3.5–5)
PROTEIN UA: NORMAL MG/DL
PROTHROMBIN TIME: 12.5 SEC (ref 9.3–12.4)
RBC # BLD: 2.26 E12/L (ref 3.8–5.8)
RBC # BLD: 2.4 E12/L (ref 3.8–5.8)
RBC UA: ABNORMAL /HPF (ref 0–2)
SEDIMENTATION RATE, ERYTHROCYTE: 75 MM/HR (ref 0–15)
SODIUM BLD-SCNC: 136 MMOL/L (ref 132–146)
SODIUM BLD-SCNC: 137 MMOL/L (ref 132–146)
SPECIFIC GRAVITY UA: 1.02 (ref 1–1.03)
TOTAL CK: 98 U/L (ref 20–200)
TOTAL IRON BINDING CAPACITY: 280 MCG/DL (ref 250–450)
TOTAL PROTEIN: 7.2 G/DL (ref 6.4–8.3)
TOTAL PROTEIN: 7.9 G/DL (ref 6.4–8.3)
TRIGL SERPL-MCNC: 44 MG/DL (ref 0–149)
TROPONIN, HIGH SENSITIVITY: 18 NG/L (ref 0–11)
TSH SERPL DL<=0.05 MIU/L-ACNC: 1.51 UIU/ML (ref 0.27–4.2)
URIC ACID, SERUM: 6 MG/DL (ref 3.4–7)
UROBILINOGEN, URINE: 0.2 E.U./DL
VITAMIN B-12: 675 PG/ML (ref 211–946)
VLDLC SERPL CALC-MCNC: 9 MG/DL
WBC # BLD: 10.5 E9/L (ref 4.5–11.5)
WBC # BLD: 6 E9/L (ref 4.5–11.5)
WBC UA: ABNORMAL /HPF (ref 0–5)

## 2021-08-13 PROCEDURE — 86850 RBC ANTIBODY SCREEN: CPT

## 2021-08-13 PROCEDURE — 84443 ASSAY THYROID STIM HORMONE: CPT

## 2021-08-13 PROCEDURE — 6370000000 HC RX 637 (ALT 250 FOR IP): Performed by: INTERNAL MEDICINE

## 2021-08-13 PROCEDURE — 93005 ELECTROCARDIOGRAM TRACING: CPT | Performed by: EMERGENCY MEDICINE

## 2021-08-13 PROCEDURE — 83036 HEMOGLOBIN GLYCOSYLATED A1C: CPT

## 2021-08-13 PROCEDURE — G0378 HOSPITAL OBSERVATION PER HR: HCPCS

## 2021-08-13 PROCEDURE — 85014 HEMATOCRIT: CPT

## 2021-08-13 PROCEDURE — 6360000002 HC RX W HCPCS: Performed by: EMERGENCY MEDICINE

## 2021-08-13 PROCEDURE — P9016 RBC LEUKOCYTES REDUCED: HCPCS

## 2021-08-13 PROCEDURE — 93010 ELECTROCARDIOGRAM REPORT: CPT | Performed by: INTERNAL MEDICINE

## 2021-08-13 PROCEDURE — 2580000003 HC RX 258: Performed by: EMERGENCY MEDICINE

## 2021-08-13 PROCEDURE — 86900 BLOOD TYPING SEROLOGIC ABO: CPT

## 2021-08-13 PROCEDURE — 83550 IRON BINDING TEST: CPT

## 2021-08-13 PROCEDURE — 83735 ASSAY OF MAGNESIUM: CPT

## 2021-08-13 PROCEDURE — 84550 ASSAY OF BLOOD/URIC ACID: CPT

## 2021-08-13 PROCEDURE — 36415 COLL VENOUS BLD VENIPUNCTURE: CPT

## 2021-08-13 PROCEDURE — 80053 COMPREHEN METABOLIC PANEL: CPT

## 2021-08-13 PROCEDURE — 36430 TRANSFUSION BLD/BLD COMPNT: CPT

## 2021-08-13 PROCEDURE — C9113 INJ PANTOPRAZOLE SODIUM, VIA: HCPCS | Performed by: EMERGENCY MEDICINE

## 2021-08-13 PROCEDURE — 85730 THROMBOPLASTIN TIME PARTIAL: CPT

## 2021-08-13 PROCEDURE — 81001 URINALYSIS AUTO W/SCOPE: CPT

## 2021-08-13 PROCEDURE — 83540 ASSAY OF IRON: CPT

## 2021-08-13 PROCEDURE — 85651 RBC SED RATE NONAUTOMATED: CPT

## 2021-08-13 PROCEDURE — 71045 X-RAY EXAM CHEST 1 VIEW: CPT

## 2021-08-13 PROCEDURE — 96374 THER/PROPH/DIAG INJ IV PUSH: CPT

## 2021-08-13 PROCEDURE — 85018 HEMOGLOBIN: CPT

## 2021-08-13 PROCEDURE — 99285 EMERGENCY DEPT VISIT HI MDM: CPT

## 2021-08-13 PROCEDURE — 86901 BLOOD TYPING SEROLOGIC RH(D): CPT

## 2021-08-13 PROCEDURE — 2580000003 HC RX 258: Performed by: INTERNAL MEDICINE

## 2021-08-13 PROCEDURE — 72125 CT NECK SPINE W/O DYE: CPT

## 2021-08-13 PROCEDURE — 86923 COMPATIBILITY TEST ELECTRIC: CPT

## 2021-08-13 PROCEDURE — 84100 ASSAY OF PHOSPHORUS: CPT

## 2021-08-13 PROCEDURE — 85025 COMPLETE CBC W/AUTO DIFF WBC: CPT

## 2021-08-13 PROCEDURE — 70450 CT HEAD/BRAIN W/O DYE: CPT

## 2021-08-13 PROCEDURE — 82550 ASSAY OF CK (CPK): CPT

## 2021-08-13 PROCEDURE — 82746 ASSAY OF FOLIC ACID SERUM: CPT

## 2021-08-13 PROCEDURE — 82607 VITAMIN B-12: CPT

## 2021-08-13 PROCEDURE — 80061 LIPID PANEL: CPT

## 2021-08-13 PROCEDURE — 84484 ASSAY OF TROPONIN QUANT: CPT

## 2021-08-13 PROCEDURE — 85610 PROTHROMBIN TIME: CPT

## 2021-08-13 RX ORDER — SERTRALINE HYDROCHLORIDE 25 MG/1
25 TABLET, FILM COATED ORAL DAILY
Status: DISCONTINUED | OUTPATIENT
Start: 2021-08-13 | End: 2021-08-14 | Stop reason: HOSPADM

## 2021-08-13 RX ORDER — DEXTROSE AND SODIUM CHLORIDE 5; .45 G/100ML; G/100ML
INJECTION, SOLUTION INTRAVENOUS CONTINUOUS
Status: DISCONTINUED | OUTPATIENT
Start: 2021-08-13 | End: 2021-08-14 | Stop reason: HOSPADM

## 2021-08-13 RX ORDER — SODIUM CHLORIDE 9 MG/ML
INJECTION, SOLUTION INTRAVENOUS PRN
Status: DISCONTINUED | OUTPATIENT
Start: 2021-08-13 | End: 2021-08-14 | Stop reason: HOSPADM

## 2021-08-13 RX ORDER — SODIUM CHLORIDE 9 MG/ML
25 INJECTION, SOLUTION INTRAVENOUS PRN
Status: DISCONTINUED | OUTPATIENT
Start: 2021-08-13 | End: 2021-08-14 | Stop reason: HOSPADM

## 2021-08-13 RX ORDER — SODIUM CHLORIDE 0.9 % (FLUSH) 0.9 %
5-40 SYRINGE (ML) INJECTION PRN
Status: DISCONTINUED | OUTPATIENT
Start: 2021-08-13 | End: 2021-08-14 | Stop reason: HOSPADM

## 2021-08-13 RX ORDER — SODIUM CHLORIDE 9 MG/ML
10 INJECTION INTRAVENOUS DAILY
Status: DISCONTINUED | OUTPATIENT
Start: 2021-08-13 | End: 2021-08-14 | Stop reason: HOSPADM

## 2021-08-13 RX ORDER — PRIMIDONE 50 MG/1
50 TABLET ORAL 2 TIMES DAILY
Status: DISCONTINUED | OUTPATIENT
Start: 2021-08-13 | End: 2021-08-13 | Stop reason: ALTCHOICE

## 2021-08-13 RX ORDER — PANTOPRAZOLE SODIUM 40 MG/10ML
40 INJECTION, POWDER, LYOPHILIZED, FOR SOLUTION INTRAVENOUS DAILY
Status: DISCONTINUED | OUTPATIENT
Start: 2021-08-13 | End: 2021-08-14 | Stop reason: HOSPADM

## 2021-08-13 RX ORDER — DONEPEZIL HYDROCHLORIDE 10 MG/1
10 TABLET, FILM COATED ORAL NIGHTLY
Status: DISCONTINUED | OUTPATIENT
Start: 2021-08-13 | End: 2021-08-13 | Stop reason: ALTCHOICE

## 2021-08-13 RX ORDER — ONDANSETRON 4 MG/1
4 TABLET, ORALLY DISINTEGRATING ORAL EVERY 8 HOURS PRN
Status: DISCONTINUED | OUTPATIENT
Start: 2021-08-13 | End: 2021-08-14 | Stop reason: HOSPADM

## 2021-08-13 RX ORDER — POLYETHYLENE GLYCOL 3350 17 G/17G
17 POWDER, FOR SOLUTION ORAL 2 TIMES DAILY
Status: DISCONTINUED | OUTPATIENT
Start: 2021-08-13 | End: 2021-08-14 | Stop reason: HOSPADM

## 2021-08-13 RX ORDER — SODIUM CHLORIDE 0.9 % (FLUSH) 0.9 %
5-40 SYRINGE (ML) INJECTION EVERY 12 HOURS SCHEDULED
Status: DISCONTINUED | OUTPATIENT
Start: 2021-08-13 | End: 2021-08-14 | Stop reason: HOSPADM

## 2021-08-13 RX ORDER — AMMONIUM LACTATE 12 G/100G
LOTION TOPICAL PRN
Status: DISCONTINUED | OUTPATIENT
Start: 2021-08-13 | End: 2021-08-14 | Stop reason: HOSPADM

## 2021-08-13 RX ORDER — ACETAMINOPHEN 325 MG/1
650 TABLET ORAL EVERY 4 HOURS PRN
Status: DISCONTINUED | OUTPATIENT
Start: 2021-08-13 | End: 2021-08-14 | Stop reason: HOSPADM

## 2021-08-13 RX ORDER — ONDANSETRON 2 MG/ML
4 INJECTION INTRAMUSCULAR; INTRAVENOUS EVERY 6 HOURS PRN
Status: DISCONTINUED | OUTPATIENT
Start: 2021-08-13 | End: 2021-08-14 | Stop reason: HOSPADM

## 2021-08-13 RX ORDER — PRAVASTATIN SODIUM 20 MG
20 TABLET ORAL DAILY
Status: DISCONTINUED | OUTPATIENT
Start: 2021-08-13 | End: 2021-08-13 | Stop reason: ALTCHOICE

## 2021-08-13 RX ADMIN — PANTOPRAZOLE SODIUM 40 MG: 40 INJECTION, POWDER, FOR SOLUTION INTRAVENOUS at 10:40

## 2021-08-13 RX ADMIN — DEXTROSE AND SODIUM CHLORIDE: 5; 450 INJECTION, SOLUTION INTRAVENOUS at 10:29

## 2021-08-13 RX ADMIN — SODIUM CHLORIDE, PRESERVATIVE FREE 10 ML: 5 INJECTION INTRAVENOUS at 10:37

## 2021-08-13 RX ADMIN — SERTRALINE HYDROCHLORIDE 25 MG: 25 TABLET ORAL at 16:58

## 2021-08-13 NOTE — ED PROVIDER NOTES
HPI:  8/13/21, Time: 12:37 AM EDT         Nivia Dykes is a 80 y.o. male presenting to the ED for fall, beginning unknown. The complaint has been intermittent, mild in severity, and worsened by nothing. Family found the patient down on the ground. They called EMS. Patient did not want to come. He has no complaints. He does not know what happened. Denies any vision changes, speech changes, numbness, tingling, weakness, chest pain, shortness of breath, abdominal pain. ROS:   Pertinent positives and negatives are stated within HPI, all other systems reviewed and are negative.  --------------------------------------------- PAST HISTORY ---------------------------------------------  Past Medical History:  has a past medical history of CAD (coronary artery disease), Cerebral artery occlusion with cerebral infarction ([facility] [address] Hemorrhage, Raynaud disease, and Tremors of nervous system. Past Surgical History:  has a past surgical history that includes Coronary angioplasty with stent and Upper gastrointestinal endoscopy (N/A, 11/13/2019). Social History:  reports that he quit smoking about 45 years ago. He has never used smokeless tobacco. He reports previous alcohol use. He reports that he does not use drugs. Family History: family history is not on file. The patients home medications have been reviewed. Allergies: Ativan [lorazepam], Clindamycin/lincomycin, and Pcn [penicillins]    ---------------------------------------------------PHYSICAL EXAM--------------------------------------    Constitutional/General: Alert and oriented x2, well appearing, non toxic in NAD  Head: Normocephalic and atraumatic  Eyes: PERRL, EOMI  Mouth: Oropharynx clear, handling secretions, no trismus  Neck: Supple, full ROM, non tender to palpation in the midline, no stridor, no crepitus, no meningeal signs  Pulmonary: Lungs clear to auscultation bilaterally, no wheezes, rales, or rhonchi.  Not in respiratory distress  Cardiovascular:  Regular rate. Regular rhythm. No murmurs, gallops, or rubs. 2+ distal pulses  Chest: no chest wall tenderness  Abdomen: Soft. Non tender. Non distended. +BS. No rebound, guarding, or rigidity. No pulsatile masses appreciated. Musculoskeletal: Moves all extremities x 4. Warm and well perfused, no clubbing, cyanosis, or edema. Capillary refill <3 seconds  Skin: warm and dry. No rashes. Neurologic: GCS 15, CN 2-12 grossly intact, no focal deficits, symmetric strength 5/5 in the upper and lower extremities bilaterally  Psych: Normal Affect    -------------------------------------------------- RESULTS -------------------------------------------------  I have personally reviewed all laboratory and imaging results for this patient. Results are listed below.      LABS:  Results for orders placed or performed during the hospital encounter of 08/13/21   CBC Auto Differential   Result Value Ref Range    WBC 10.5 4.5 - 11.5 E9/L    RBC 2.40 (L) 3.80 - 5.80 E12/L    Hemoglobin 7.3 (L) 12.5 - 16.5 g/dL    Hematocrit 22.8 (L) 37.0 - 54.0 %    MCV 95.0 80.0 - 99.9 fL    MCH 30.4 26.0 - 35.0 pg    MCHC 32.0 32.0 - 34.5 %    RDW 14.6 11.5 - 15.0 fL    Platelets 255 (L) 309 - 450 E9/L    MPV 11.4 7.0 - 12.0 fL    Neutrophils % 91.3 (H) 43.0 - 80.0 %    Lymphocytes % 5.2 (L) 20.0 - 42.0 %    Monocytes % 3.5 2.0 - 12.0 %    Eosinophils % 0.1 0.0 - 6.0 %    Basophils % 0.1 0.0 - 2.0 %    Neutrophils Absolute 9.56 (H) 1.80 - 7.30 E9/L    Lymphocytes Absolute 0.53 (L) 1.50 - 4.00 E9/L    Monocytes Absolute 0.42 0.10 - 0.95 E9/L    Eosinophils Absolute 0.00 (L) 0.05 - 0.50 E9/L    Basophils Absolute 0.00 0.00 - 0.20 E9/L    Polychromasia 1+     Hypochromia 1+     Poikilocytes 1+     Acanthocytes 1+     Ovalocytes 1+     Basophilic Stippling 1+    Comprehensive Metabolic Panel w/ Reflex to MG   Result Value Ref Range    Sodium 137 132 - 146 mmol/L    Potassium reflex Magnesium 4.9 3.5 - 5.0 mmol/L    Chloride XR CHEST PORTABLE   Final Result   No acute process. EKG Interpretation  Interpreted by emergency department physician    Rhythm: normal sinus   Rate: normal  Axis: right  Conduction: right bundle branch block (complete)  ST Segments: no acute change  T Waves: no acute change    Clinical Impression: non-specific EKG  Comparison to prior EKG: stable as compared to patient's most recent EKG and None      ------------------------- NURSING NOTES AND VITALS REVIEWED ---------------------------   The nursing notes within the ED encounter and vital signs as below have been reviewed by myself. BP (!) 113/44   Pulse 88   Temp 98.5 °F (36.9 °C) (Oral)   Resp 19   Wt 135 lb (61.2 kg)   SpO2 95%   BMI 21.79 kg/m²   Oxygen Saturation Interpretation: Normal    The patients available past medical records and past encounters were reviewed. ------------------------------ ED COURSE/MEDICAL DECISION MAKING----------------------  Medications   pantoprazole (PROTONIX) injection 40 mg (has no administration in time range)     And   sodium chloride (PF) 0.9 % injection 10 mL (has no administration in time range)   sodium chloride flush 0.9 % injection 5-40 mL (has no administration in time range)   sodium chloride flush 0.9 % injection 5-40 mL (has no administration in time range)   0.9 % sodium chloride infusion (has no administration in time range)   acetaminophen (TYLENOL) tablet 650 mg (has no administration in time range)   ondansetron (ZOFRAN-ODT) disintegrating tablet 4 mg (has no administration in time range)     Or   ondansetron (ZOFRAN) injection 4 mg (has no administration in time range)             Medical Decision Making:    Vitals stable. Labs and imaging obtained. Hemoglobin 7.3. Physical exam is remarkable for Hemoccult positive stool. Patient started on Protonix. CT of the head does not show an acute process. Chest x-ray is unremarkable. Urinalysis does not show signs of infection. Discussed admission with daughter. She is agreeable with the patient being admitted for further work-up. I spoke with Dr. Nohemy Llanes. He accepts admission. Re-Evaluations:             Re-evaluation. Patients symptoms show no change      This patient's ED course included: a personal history and physicial examination, re-evaluation prior to disposition, multiple bedside re-evaluations, IV medications, cardiac monitoring, continuous pulse oximetry and a personal history and physicial eaxmination    This patient has remained hemodynamically stable during their ED course. Counseling: The emergency provider has spoken with the patient and family member patient and daughter and discussed todays results, in addition to providing specific details for the plan of care and counseling regarding the diagnosis and prognosis. Questions are answered at this time and they are agreeable with the plan.       --------------------------------- IMPRESSION AND DISPOSITION ---------------------------------    IMPRESSION  1. Gastrointestinal hemorrhage, unspecified gastrointestinal hemorrhage type        DISPOSITION  Disposition: Admit to telemetry  Patient condition is stable        NOTE: This report was transcribed using voice recognition software.  Every effort was made to ensure accuracy; however, inadvertent computerized transcription errors may be present          Cristopher King MD  08/13/21 7414

## 2021-08-13 NOTE — H&P
[address]                              HISTORY AND PHYSICAL    PATIENT NAME: Nito Chávez                     :        1925  MED REC NO:   [id number]                            ROOM:       7157  ACCOUNT NO:   [de-identified]                           ADMIT DATE: 2021  PROVIDER:     Tanisha Barahona DO    CHIEF COMPLAINT AND HISTORY OF CHIEF COMPLAINT:  This is a 80-year-old  white male who was admitted to [address] [place] [address]. The patient  apparently presented to the hospital here through the emergency room  after sustaining a fall, duration of this was uncertain. Apparently,  the patient presented here with underlying history of dementia. The  complaint has been intermittent. The family saw the patient lying on  the ground. They did call EMS. The patient did not want to come in. He does suffer from dementia. The patient presented to the hospital  here and was seen and evaluated. It was noted at that time that he was  anemic and was admitted to the hospital here with a diagnosis of  gastrointestinal bleed. The patient was admitted under the services of  Dr. Pedro Luis Slade and Dr. Shagufta Babb. The patient was seen at this time. No family  members are present. From a cardiac standpoint view, he currently  denies any chest pain, although he does suffer from dementia. He does  have a history of known coronary artery disease. Respiratory wise, he  did not appear to be dyspneic and did not appear to be wearing oxygen. Gastrointestinal wise, he denies any abdominal pain, but it is noted  that his hemoglobin has dropped down to 6.8 at this time. He has been  seen by Gastroenterology. Genitourinary wise, he denies any dysuria,  but does relate to having frequency. Neurologically, he is moving all  his extremities, but is somewhat limited at this time due to underlying  dementia.   The patient was admitted to the hospital here with a  diagnosis of fall and possible GI bleed. ALLERGIES:  He is allergic to ATIVAN, CLINDAMYCIN, and PENICILLIN. MEDICATIONS PRIOR TO ADMISSION:  Include Zoloft 25 daily, MiraLax 17 gm  daily, Pravachol 40 daily, Vasotec 2.5 daily. Mysoline has been  discontinued. He is on Aricept 10 daily. PAST MEDICAL HISTORY:  Positive for usual childhood diseases, history of  coronary artery disease, history of stroke in the past, history of  hemorrhage in the right eye, Raynaud's phenomenon, and tremors. PAST SURGICAL HISTORY:  Includes coronary artery disease with stent  placement and history of upper GI panendoscopy in 2019. FAMILY HISTORY:  Parents are . SOCIAL HISTORY:  Uncertain whether or not the patient smoked. Denies  any use of alcohol or drugs. Currently appeared to be . Lives  at home with family member. He does have at least one child; otherwise  he is not quite sure. He relates to even having three or four. REVIEW OF THE CHART:  Iron studies are normal.  Sed rate is 75. BUN and  creatinine 35 and 1.1 respectively. Electrolytes were satisfactory. Hemoglobin and hematocrit is 6.8 and 21.8 respectively. White count is  6000. EKG showed normal sinus rhythm. CT of the cervical spine showed  no acute fractures. CT of the head showed chronic findings. Chest  x-ray is unremarkable. Urinalysis is negative. PHYSICAL EXAMINATION:  VITAL SIGNS:  Show height to be 6 feet, weight 135 pounds, BMI 18.31,  blood pressure 148/68, pulse 80, and respirations 18. GENERAL APPEARANCE:  This is a 70-year-old white male who at this time  is alert to person only, not place or time. HEENT: Hair distribution normal for age and sex. Pupils equal  and reactive to light. Buccal mucosa is dry. NECK:  Revealed flat JVD. Trachea midline. Thyroid not palpable. LUNGS:  Diminished. HEART:  Fairly regular. Grade 1/6 murmur. No S3. No S4.  ABDOMEN:  Soft.   EXTREMITIES: Without clubbing, cyanosis or edema. Moving all  extremities. BREASTS:  Normal male. RECTAL:  Not performed. GENITAL:  Not performed. NEUROLOGIC:  Revealed moving all extremities, although he does have  cognitive impairment. IMPRESSION:  At this time includes:  1. Status post mechanical fall without obvious fracture. 2.  History of dementia. 3.  Normochromic normocytic anemia with suspected gastrointestinal  bleed. 4.  History of depression. 5.  Hyperlipidemia. 6.  Essential hypertension. 7.  Dementia with cognitive impairment. PLAN AND RECOMMENDATIONS:  At this time, currently the patient does  appear stable. No family members are present. He has been admitted to  the hospital to the general telemetry floor. We did suspect drop in  hemoglobin reflecting a GI bleed. Aspirin will be withheld. We will  place him on a PPI. GI has been consulted at this time. He will be  transfused accordingly and further workup will be based upon further  testing.         Deja Mcguire DO    D: 08/13/2021 14:35:46       T: 08/13/2021 17:12:11     [email or web address]  Job#: [id number]     Doc#: [id number]    CC:

## 2021-08-13 NOTE — ED NOTES
Patient standing at bottom of bed. Pulled all telemetry equipment off.      Cristóbal Sanches RN  08/13/21 7732

## 2021-08-13 NOTE — ED NOTES
Daughter verbalizing concern for sitter when he is admitted. States that father will repeatedly get out of bed and pull off equipment. Also states that he will be hungry. Educated on order and need for NPO status with condition. Verbalized understanding.          Leonel James RN  08/13/21 4753

## 2021-08-13 NOTE — CONSULTS
[facility]  Dr. Lucien Huggins M.D., Dr. Luiz Farias M.D., NEYMAR Ulloa.O., Dr. Malinda Scales M.D., Dr. Efren Rapp D.O. Patient Name: Herber Fisher  MRN: [id number]  : 1925 (80 y.o. male)  Allergies: is allergic to ativan [lorazepam], clindamycin/lincomycin, and pcn [penicillins]. Date of Service: 2021       Reason for Consultation: Acute on chronic anemia    HISTORY OF PRESENT ILLNESS:    Patient is a 78-year-old  with a past medical significant for dementia, and essential hypertension. Patient presented to [facility] emergency room with main point of falls. Patient was CT of the head and cervical neck and back negative for any acute pathology. Patient had routine lab work obtained patient did have a hemoglobin of 7.3 with MCV of 95.0. This is down from hemoglobin of 10.1 in May 2018. Patient found to have faintly positive Hemoccult stool GI was consulted for this. Patient was seen and evaluated on the fourth floor this a.m. patient underlying dementia and history is obtained through chart review and discussion with patient's power of  Geoffry Seats. He states that her father has increasing fatigue and some weakness over the last several days to weeks. He denies patient taking any ibuprofen Aleve or NSAIDs at home. Discussion for possible EGD evaluation this afternoon was discussed with her in detail including risks and benefits. At this time due to her father's advanced age she wishes to hold off on endoscopic evaluation proceed with conservative measures. REVIEW OF SYSTEMS:   Constitutional: Denies fever, chills, or unintentional weight loss. HEENT: Denies double or blurry vision, headaches, ear pain or ringing in the ears. No drainage from the ears, nose or throat. Cardiovascular: Denies any chest pain, irregular heartbeats, or palpitations.    Respiratory: Denies shortness of breath, coughing, sputum production, hemoptysis, or wheezing. Gastrointestinal: Denies nausea, vomiting, diarrhea, or constipation. Denies any abdominal pain, black or bloody stools. Genitourinary: Denies any urinary urgency, frequency, hematuria. Voiding without difficulty. Endocrine: Denies sensitivity to heat or cold. Denies changes in hair, skin or nails. Denies excessive thirst, hunger or going to the bathroom more frequently than usual.  Extremities: Denies swelling or calf pain. Musculoskeletal: Denies muscle or joint pain, stiffness, arthritis, gout, or instability. Dermatology / Skin: Denies any rashes, ulcers, or excoriations. Denies bruising. Neurology: Denies any bowel or bladder incontinence, headache or focal neurological deficits. No weakness or paresthesia. Denies numbness or tingling in the hands or feet. Psychiatric: Denies nervousness/anxiety, depression or memory loss. Past Medical History:  Past Medical History:   Diagnosis Date    CAD (coronary artery disease)     Cerebral artery occlusion with cerebral infarction ([facility] [address]     Hemorrhage     right eye    Raynaud disease     Tremors of nervous system        Past Surgical History:  Past Surgical History:   Procedure Laterality Date    CORONARY ANGIOPLASTY WITH STENT PLACEMENT      UPPER GASTROINTESTINAL ENDOSCOPY N/A 11/13/2019    EGD BIOPSY performed by Trudy Wilcox MD at [address] [place] [address] Medications:  Prior to Admission medications    Medication Sig Start Date End Date Taking? Authorizing Provider   sertraline (ZOLOFT) 25 MG tablet Take 25 mg by mouth daily    Historical Provider, MD   [facility] VITAMIN A PO Take 2,400 mcg by mouth Daily    Historical Provider, MD   polyethylene glycol (MIRALAX) PACK packet Take 17 g by mouth 2 times daily    Historical Provider, MD   ammonium lactate (LAC-HYDRIN) 12 % lotion Apply topically as needed for Dry Skin Apply topically as needed.     Historical Provider, MD   mineral oil-hydrophilic petrolatum (HYDROPHOR) ointment Apply topically as needed for Dry Skin Apply topically as needed. Historical Provider, MD   aspirin 81 MG chewable tablet Take 81 mg by mouth daily    Historical Provider, MD   vitamin D 1000 UNITS CAPS Take by mouth daily     Historical Provider, MD   pravastatin (PRAVACHOL) 40 MG tablet Take 20 mg by mouth daily Take one half tablet at bedtime. Historical Provider, MD   enalapril (VASOTEC) 2.5 MG tablet Take 2.5 mg by mouth daily. Historical Provider, MD   primidone (MYSOLINE) 50 MG tablet Take 50 mg by mouth 2 times daily. Historical Provider, MD   donepezil (ARICEPT) 10 MG tablet Take 10 mg by mouth nightly. Historical Provider, MD       Allergies: Ativan [lorazepam], Clindamycin/lincomycin, and Pcn [penicillins]    Social History:  Social History     Socioeconomic History    Marital status:      Spouse name: Not on file    Number of children: Not on file    Years of education: Not on file    Highest education level: Not on file   Occupational History    Not on file   Tobacco Use    Smoking status: Former Smoker     Quit date: 1976     Years since quittin.6    Smokeless tobacco: Never Used   Vaping Use    Vaping Use: Never used   Substance and Sexual Activity    Alcohol use: Not Currently     Comment:      Drug use: No    Sexual activity: Not Currently   Other Topics Concern    Not on file   Social History Narrative    Not on file     Social Determinants of Health     Financial Resource Strain:     Difficulty of Paying Living Expenses:    Food Insecurity:     Worried About Running Out of Food in the Last Year:     [address] in the Last Year:    Transportation Needs:     Lack of Transportation (Medical):      Lack of Transportation (Non-Medical):    Physical Activity:     Days of Exercise per Week:     Minutes of Exercise per Session:    Stress:     Feeling of Stress :    Social Connections:     Frequency of Communication with Friends and Family:  Frequency of Social Gatherings with Friends and Family:     Attends [employer] Services:     Active Member of Clubs or Organizations:     Attends Club or Organization Meetings:     Marital Status:    Intimate Partner Violence:     Fear of Current or Ex-Partner:     Emotionally Abused:     Physically Abused:     Sexually Abused:        Family History:  History reviewed. No pertinent family history. PHYSICAL EXAM:  Vital Signs: BP (!) 128/54   Pulse 79   Temp 98.5 °F (36.9 °C) (Oral)   Resp 19   Wt 135 lb (61.2 kg)   SpO2 94%   BMI 21.79 kg/m²   GENERAL APPEARANCE:  awake, alert, oriented, cooperative, and in no acute distress  EYES:  Lids and lashes normal, PERRLA, EOMI, sclera clear, conjunctiva normal  HENT:  Normocephalic, without obvious abnormality, atramatic, oral pharynx with moist mucus membranes, tonsils without erythema or exudates  NECK:  Supple with no carotid bruits, JVD or thyromegaly. No cervical adenopathy  LUNGS:  Clear to auscultation bilaterally with no wheezes, rales or rhonchi. No increased work of breathing, good air exchange. CARDIOVASCULAR: Regular rate and rhythm, no murmur  ABDOMEN:  normal bowel sounds in all 4 quadrants, soft, non-distended, non-tender, no masses palpated, no hepatosplenomegally  MUSCULOSKELETAL:  There is no redness, warmth, or swelling of the joints. Full range of motion noted. Motor strength is 5 out of 5 all extremities bilaterally. Tone is normal.  EXTREMITIES: No edema, 2+ pulses bilaterally (radial and dorsalis pedis)  NEUROLOGIC:  Awake, alert, oriented to name, place and time. Cranial nerves II-XII are grossly intact. Motor is 5 out of 5 bilaterally. SKIN: Normal skin color, texture, and turgor. There is no redness, warmth, or swelling. No bruising or bleeding, no mottling. PSYCH: Affect, behavior and insight are all within normal limits.       DATA:  Results for orders placed or performed during the hospital encounter of 08/13/21 Negative Negative    Ketones, Urine Negative Negative mg/dL    Specific Gravity, UA 1.020 1.005 - 1.030    Blood, Urine Negative Negative    pH, UA 6.0 5.0 - 9.0    Protein, UA TRACE Negative mg/dL    Urobilinogen, Urine 0.2 <2.0 E.U./dL    Nitrite, Urine Negative Negative    Leukocyte Esterase, Urine Negative Negative   CK   Result Value Ref Range    Total CK 98 20 - 200 U/L   Microscopic Urinalysis   Result Value Ref Range    WBC, UA 2-5 0 - 5 /HPF    RBC, UA NONE 0 - 2 /HPF    Epithelial Cells, UA FEW /HPF    Bacteria, UA FEW (A) None Seen /HPF   EKG 12 Lead   Result Value Ref Range    Ventricular Rate 77 BPM    Atrial Rate 77 BPM    P-R Interval 192 ms    QRS Duration 146 ms    Q-T Interval 422 ms    QTc Calculation (Bazett) 477 ms    P Axis 68 degrees    R Axis 58 degrees    T Axis 32 degrees   TYPE AND SCREEN   Result Value Ref Range    ABO/Rh O POS     Antibody Screen NEG          IMAGING:  CT Head WO Contrast    Result Date: 8/13/2021  EXAMINATION: CT OF THE HEAD WITHOUT CONTRAST  8/13/2021 3:34 am TECHNIQUE: CT of the head was performed without the administration of intravenous contrast. Dose modulation, iterative reconstruction, and/or weight based adjustment of the mA/kV was utilized to reduce the radiation dose to as low as reasonably achievable. COMPARISON: None. HISTORY: ORDERING SYSTEM PROVIDED HISTORY: fall TECHNOLOGIST PROVIDED HISTORY: Reason for exam:->fall Has a \"code stroke\" or \"stroke alert\" been called? ->No Decision Support Exception - unselect if not a suspected or confirmed emergency medical condition->Emergency Medical Condition (MA) FINDINGS: BRAIN/VENTRICLES: There is no acute intracranial hemorrhage, mass effect or midline shift. No abnormal extra-axial fluid collection. The gray-white differentiation is maintained without evidence of an acute infarct. There is no evidence of hydrocephalus. There is senescent cerebral and cerebellar atrophy.   Patchy periventricular white matter hypodensity is noted, consistent with moderate chronic small vessel ischemic change. Bilateral carotid siphon calcification is noted. ORBITS: The visualized portion of the orbits demonstrate no acute abnormality. The native right ocular lens is not seen, compatible with prior unilateral cataract surgery. SINUSES: The visualized paranasal sinuses and mastoid air cells demonstrate no acute abnormality. SOFT TISSUES/SKULL:  No acute abnormality of the visualized skull or soft tissues. Marginally imaged periodontal disease with periapical lucency notably about the left maxillary canine. No acute intracranial abnormality. Chronic findings as described     CT CERVICAL SPINE WO CONTRAST    Result Date: 8/13/2021  EXAMINATION: CT OF THE CERVICAL SPINE WITHOUT CONTRAST 8/13/2021 3:34 am TECHNIQUE: CT of the cervical spine was performed without the administration of intravenous contrast. Multiplanar reformatted images are provided for review. Dose modulation, iterative reconstruction, and/or weight based adjustment of the mA/kV was utilized to reduce the radiation dose to as low as reasonably achievable. COMPARISON: Cervical CT from 02/28/2019. HISTORY: ORDERING SYSTEM PROVIDED HISTORY: fall TECHNOLOGIST PROVIDED HISTORY: Reason for exam:->fall Decision Support Exception - unselect if not a suspected or confirmed emergency medical condition->Emergency Medical Condition (MA) FINDINGS: BONES/ALIGNMENT: There is no acute fracture or traumatic malalignment. DEGENERATIVE CHANGES: Atlantoaxial spurring superiorly, which abuts the basion. Relatively mild multilevel osteophytosis, worse at C5-C6. Osseous neural foraminal narrowing bilaterally at C3-C4, also C5-C6 on the right. Multilevel loss of disc height, worse at C6-C7. Multilevel bilateral facet arthropathy, notably on the left at C4-C5. SOFT TISSUES: There is no prevertebral soft tissue swelling.   Relatively advanced centrilobular emphysema of the visualized bilateral upper lobes, with component of biapical pleuroparenchymal scarring/fibro atelectatic change. No acute abnormality of the cervical spine. XR CHEST PORTABLE    Result Date: 8/13/2021  EXAMINATION: ONE XRAY VIEW OF THE CHEST 8/13/2021 2:17 am COMPARISON: None. HISTORY: ORDERING SYSTEM PROVIDED HISTORY: chest pain TECHNOLOGIST PROVIDED HISTORY: Reason for exam:->chest pain FINDINGS: The lungs are without acute focal process. There is no effusion or pneumothorax. Cardiomegaly. The osseous structures are without acute process. No acute process. ASSESSMENT/PLAN:      1. Acute on chronic anemia  -Vital signs stable no overt signs of GI bleed on exam  -Baseline hemoglobin 10.1 in 2018  -Hemoglobin 7.3 this a.m.  -Protonix 40mg IV BID/Type and Cross 2 Units on hold/Elevate HOB 30 Degrees/q 6 Hr Hgb transfuse for Hgb less than 7.0 per admitting  -EGD in 11/20/2019   esophagitis present  -Patient did not wish to pursue colonoscopy in 2018    Discussion for possible EGD evaluation this afternoon was discussed with patient's power of  Esta Speak in detail including risks and benefits. At this time due to her father's advanced age she wishes to hold off on endoscopic evaluation proceed with conservative measures. Please see orders for further plan of care. Reviewed above with Dr. Mark Zapata DO, D.O.   GI Fellow  8/13/2021 at 9:02 AM    Pt seen and independently examined. Pertinent notes and lab work reviewed. D/w Dr. Atif Rudolph. Agree with physical exam and A&P. Discussed with patient/family (daughter) at bedside - all questions answered -I have explained that in case of continuous decrease in hemoglobin/ongoing bleeding, patient is at risk for worsening clinical condition and death  -she voiced understanding and states she does not want invasive procedures including endoscopy evidence for failure to have his diet restarted.   Thank you for the opportunity to see this patient in consultation.     Ethan Lim MD  8/13/2021  11:54 AM

## 2021-08-13 NOTE — ED NOTES
Patient ambulated from stretcher to toilet. Produced large bowel movement.      Kathie Lion RN  08/13/21 [id number]

## 2021-08-13 NOTE — ED NOTES
Patient found standing at bedside with IV pulled from arm. Cleaned and re-directed back into bed.      Sinai Khanna RN  08/13/21 [id number]

## 2021-08-14 VITALS
TEMPERATURE: 97.8 F | WEIGHT: 135 LBS | OXYGEN SATURATION: 94 % | RESPIRATION RATE: 16 BRPM | SYSTOLIC BLOOD PRESSURE: 179 MMHG | DIASTOLIC BLOOD PRESSURE: 72 MMHG | HEART RATE: 67 BPM | BODY MASS INDEX: 18.28 KG/M2 | HEIGHT: 72 IN

## 2021-08-14 LAB
ANION GAP SERPL CALCULATED.3IONS-SCNC: 10 MMOL/L (ref 7–16)
BASOPHILS ABSOLUTE: 0 E9/L (ref 0–0.2)
BASOPHILS RELATIVE PERCENT: 0.3 % (ref 0–2)
BUN BLDV-MCNC: 24 MG/DL (ref 6–23)
CALCIUM SERPL-MCNC: 8.5 MG/DL (ref 8.6–10.2)
CHLORIDE BLD-SCNC: 106 MMOL/L (ref 98–107)
CO2: 21 MMOL/L (ref 22–29)
CREAT SERPL-MCNC: 1 MG/DL (ref 0.7–1.2)
EOSINOPHILS ABSOLUTE: 0.03 E9/L (ref 0.05–0.5)
EOSINOPHILS RELATIVE PERCENT: 0.9 % (ref 0–6)
GFR AFRICAN AMERICAN: >60
GFR NON-AFRICAN AMERICAN: >60 ML/MIN/1.73
GLUCOSE BLD-MCNC: 113 MG/DL (ref 74–99)
HCT VFR BLD CALC: 31.4 % (ref 37–54)
HEMOGLOBIN: 9.9 G/DL (ref 12.5–16.5)
LYMPHOCYTES ABSOLUTE: 0.99 E9/L (ref 1.5–4)
LYMPHOCYTES RELATIVE PERCENT: 26.1 % (ref 20–42)
MCH RBC QN AUTO: 30.3 PG (ref 26–35)
MCHC RBC AUTO-ENTMCNC: 31.5 % (ref 32–34.5)
MCV RBC AUTO: 96 FL (ref 80–99.9)
MONOCYTES ABSOLUTE: 0.15 E9/L (ref 0.1–0.95)
MONOCYTES RELATIVE PERCENT: 4.3 % (ref 2–12)
NEUTROPHILS ABSOLUTE: 2.62 E9/L (ref 1.8–7.3)
NEUTROPHILS RELATIVE PERCENT: 68.7 % (ref 43–80)
PDW BLD-RTO: 14.6 FL (ref 11.5–15)
PLATELET # BLD: 96 E9/L (ref 130–450)
PLATELET CONFIRMATION: NORMAL
PMV BLD AUTO: 11.9 FL (ref 7–12)
POTASSIUM SERPL-SCNC: 4.2 MMOL/L (ref 3.5–5)
RBC # BLD: 3.27 E12/L (ref 3.8–5.8)
SODIUM BLD-SCNC: 137 MMOL/L (ref 132–146)
WBC # BLD: 3.8 E9/L (ref 4.5–11.5)

## 2021-08-14 PROCEDURE — 96376 TX/PRO/DX INJ SAME DRUG ADON: CPT

## 2021-08-14 PROCEDURE — 85025 COMPLETE CBC W/AUTO DIFF WBC: CPT

## 2021-08-14 PROCEDURE — C9113 INJ PANTOPRAZOLE SODIUM, VIA: HCPCS | Performed by: EMERGENCY MEDICINE

## 2021-08-14 PROCEDURE — 6370000000 HC RX 637 (ALT 250 FOR IP): Performed by: INTERNAL MEDICINE

## 2021-08-14 PROCEDURE — 2580000003 HC RX 258: Performed by: INTERNAL MEDICINE

## 2021-08-14 PROCEDURE — 80048 BASIC METABOLIC PNL TOTAL CA: CPT

## 2021-08-14 PROCEDURE — 36415 COLL VENOUS BLD VENIPUNCTURE: CPT

## 2021-08-14 PROCEDURE — G0378 HOSPITAL OBSERVATION PER HR: HCPCS

## 2021-08-14 PROCEDURE — 6360000002 HC RX W HCPCS: Performed by: EMERGENCY MEDICINE

## 2021-08-14 RX ORDER — PANTOPRAZOLE SODIUM 40 MG/1
40 TABLET, DELAYED RELEASE ORAL
Qty: 30 TABLET | Refills: 0 | Status: SHIPPED | OUTPATIENT
Start: 2021-08-14 | End: 2021-01-01

## 2021-08-14 RX ADMIN — SERTRALINE HYDROCHLORIDE 25 MG: 25 TABLET ORAL at 10:45

## 2021-08-14 RX ADMIN — PANTOPRAZOLE SODIUM 40 MG: 40 INJECTION, POWDER, FOR SOLUTION INTRAVENOUS at 09:33

## 2021-08-14 RX ADMIN — DEXTROSE AND SODIUM CHLORIDE: 5; 450 INJECTION, SOLUTION INTRAVENOUS at 10:45

## 2021-08-14 NOTE — DISCHARGE SUMMARY
Internal Medicine Progress Note     [facility]     Joline Angelucci. Herman Alvarez.JAVIER D.O., CARLEY Dominguez, MSN, APRN, NP-C  Kalie Qiu. Lina Tinajero, MSN, APRN-CNP       Internal Medicine  Discharge Summary    NAME: Sulaiman Valentino  :  1925  MRN:  [id number]  PCP:Pavan Avendano DO  ADMITTED: 2021      DISCHARGED: 21    ADMITTING PHYSICIAN: Mateo Crandall DO    CONSULTANT(S):   IP CONSULT TO GI  IP CONSULT TO SPIRITUAL SERVICES     ADMITTING DIAGNOSIS:   GI bleed [K92.2]  Gastrointestinal hemorrhage, unspecified gastrointestinal hemorrhage type [K92.2]     DISCHARGE DIAGNOSES:   · Acute on chronic blood loss anemia with resultant syncopal event status post transfusion  · Significant essential tremor disorder without Parkinson's disease per family  · Advanced dementia with mood disturbance  · Essential hypertension hyperlipidemia  · DNR CCA, family opts against invasive work-up    BRIEF HISTORY OF PRESENT ILLNESS:   This is a 59-year-old  white male who was admitted to [address] [place] [address]. The patient  apparently presented to the hospital here through the emergency room  after sustaining a fall, duration of this was uncertain. Apparently,  the patient presented here with underlying history of dementia. The  complaint has been intermittent. The family saw the patient lying on  the ground. They did call EMS. The patient did not want to come in. He does suffer from dementia. The patient presented to the hospital  here and was seen and evaluated. It was noted at that time that he was  anemic and was admitted to the hospital here with a diagnosis of  gastrointestinal bleed. The patient was admitted under the services of  Dr. Otto Trammell and  [facility]. The patient was seen at this time. No family  members are present.   From a cardiac standpoint view, he currently  denies any chest pain, although he does suffer from dementia. He does  have a history of known coronary artery disease. Respiratory wise, he  did not appear to be dyspneic and did not appear to be wearing oxygen. Gastrointestinal wise, he denies any abdominal pain, but it is noted  that his hemoglobin has dropped down to 6.8 at this time. He has been  seen by Gastroenterology. Genitourinary wise, he denies any dysuria,  but does relate to having frequency. Neurologically, he is moving all  his extremities, but is somewhat limited at this time due to underlying  dementia.   The patient was admitted to the hospital here with a  diagnosis of fall and possible GI bleed.       LABS[de-identified]  Lab Results   Component Value Date    WBC 6.0 08/13/2021    HGB 8.3 (L) 08/13/2021    HCT 25.8 (L) 08/13/2021    PLT 94 (L) 08/13/2021     08/13/2021    K 4.5 08/13/2021     08/13/2021    CREATININE 1.1 08/13/2021    BUN 35 (H) 08/13/2021    CO2 24 08/13/2021    GLUCOSE 102 (H) 08/13/2021    ALT 8 08/13/2021    AST 22 08/13/2021    INR 1.1 08/13/2021     Lab Results   Component Value Date    INR 1.1 08/13/2021    INR 1.1 02/09/2018    INR 1.1 03/21/2017    PROTIME 12.5 (H) 08/13/2021    PROTIME 12.6 (H) 02/09/2018    PROTIME 12.3 03/21/2017      Lab Results   Component Value Date    TSH 1.510 08/13/2021     Lab Results   Component Value Date    TRIG 44 08/13/2021    TRIG 46 05/13/2018    TRIG 44 08/07/2015     Lab Results   Component Value Date    HDL 59 08/13/2021    HDL 56 05/13/2018    HDL 55 08/07/2015     Lab Results   Component Value Date    LDLCALC 53 08/13/2021    LDLCALC 47 05/13/2018    LDLCALC 32 08/07/2015     Lab Results   Component Value Date    LABA1C 5.3 08/13/2021       IMAGING:  CT Head WO Contrast    Result Date: 8/13/2021  EXAMINATION: CT OF THE HEAD WITHOUT CONTRAST  8/13/2021 3:34 am TECHNIQUE: CT of the head was performed without the administration of intravenous contrast. Dose modulation, iterative reconstruction, and/or weight based adjustment of the mA/kV was utilized to reduce the radiation dose to as low as reasonably achievable. COMPARISON: None. HISTORY: ORDERING SYSTEM PROVIDED HISTORY: fall TECHNOLOGIST PROVIDED HISTORY: Reason for exam:->fall Has a \"code stroke\" or \"stroke alert\" been called? ->No Decision Support Exception - unselect if not a suspected or confirmed emergency medical condition->Emergency Medical Condition (MA) FINDINGS: BRAIN/VENTRICLES: There is no acute intracranial hemorrhage, mass effect or midline shift. No abnormal extra-axial fluid collection. The gray-white differentiation is maintained without evidence of an acute infarct. There is no evidence of hydrocephalus. There is senescent cerebral and cerebellar atrophy. Patchy periventricular white matter hypodensity is noted, consistent with moderate chronic small vessel ischemic change. Bilateral carotid siphon calcification is noted. ORBITS: The visualized portion of the orbits demonstrate no acute abnormality. The native right ocular lens is not seen, compatible with prior unilateral cataract surgery. SINUSES: The visualized paranasal sinuses and mastoid air cells demonstrate no acute abnormality. SOFT TISSUES/SKULL:  No acute abnormality of the visualized skull or soft tissues. Marginally imaged periodontal disease with periapical lucency notably about the left maxillary canine. No acute intracranial abnormality. Chronic findings as described     CT CERVICAL SPINE WO CONTRAST    Result Date: 8/13/2021  EXAMINATION: CT OF THE CERVICAL SPINE WITHOUT CONTRAST 8/13/2021 3:34 am TECHNIQUE: CT of the cervical spine was performed without the administration of intravenous contrast. Multiplanar reformatted images are provided for review. Dose modulation, iterative reconstruction, and/or weight based adjustment of the mA/kV was utilized to reduce the radiation dose to as low as reasonably achievable. COMPARISON: Cervical CT from 02/28/2019.  HISTORY: ORDERING SYSTEM PROVIDED HISTORY: fall TECHNOLOGIST PROVIDED HISTORY: Reason for exam:->fall Decision Support Exception - unselect if not a suspected or confirmed emergency medical condition->Emergency Medical Condition (MA) FINDINGS: BONES/ALIGNMENT: There is no acute fracture or traumatic malalignment. DEGENERATIVE CHANGES: Atlantoaxial spurring superiorly, which abuts the basion. Relatively mild multilevel osteophytosis, worse at C5-C6. Osseous neural foraminal narrowing bilaterally at C3-C4, also C5-C6 on the right. Multilevel loss of disc height, worse at C6-C7. Multilevel bilateral facet arthropathy, notably on the left at C4-C5. SOFT TISSUES: There is no prevertebral soft tissue swelling. Relatively advanced centrilobular emphysema of the visualized bilateral upper lobes, with component of biapical pleuroparenchymal scarring/fibro atelectatic change. No acute abnormality of the cervical spine. XR CHEST PORTABLE    Result Date: 8/13/2021  EXAMINATION: ONE XRAY VIEW OF THE CHEST 8/13/2021 2:17 am COMPARISON: None. HISTORY: ORDERING SYSTEM PROVIDED HISTORY: chest pain TECHNOLOGIST PROVIDED HISTORY: Reason for exam:->chest pain FINDINGS: The lungs are without acute focal process. There is no effusion or pneumothorax. Cardiomegaly. The osseous structures are without acute process. No acute process. HOSPITAL COURSE:   Nicole Gautam is a 80-year-old male patient who was admitted to [address] [place] [address] last evening due to a mechanical fall that was presumed to be precipitated by acute anemia. He responded well to transfusion. Blood counts were reassessed today to ensure stability. GI provided consultation and, after further discussion with the family, they have opted against any further aggressive work-up. This would include extensive syncopal evaluation as they would not be open to considering intervention if work-up was positive. His home medications were resumed including a baby aspirin, PPI therapy was added. Close outpatient follow-up with his primary care provider was discussed with the patient's daughter as well. He has extensive familial support at home as well as 24-hour assistance. He is acceptable for discharge home as discussed with conservative medical management. BRIEF PHYSICAL EXAMINATION AND LABORATORIES ON DAY OF DISCHARGE:  VITALS:  BP (!) 179/72   Pulse 67   Temp 97.8 °F (36.6 °C) (Oral)   Resp 16   Ht 6' (1.829 m)   Wt 135 lb (61.2 kg)   SpO2 94%   BMI 18.31 kg/m²     HEENT:  PERRLA. EOMI. Sclera clear. Buccal mucosa moist.    Neck:  Supple. Trachea midline. No thyromegaly. No JVD. No bruits. Heart:  Rhythm regular, rate controlled. S1 and S2. Systolic murmur. Lungs:  Symmetrical. Mildly diminished. Otherwise clear to auscultation bilaterally. No wheezes. No rhonchi. No rales. Abdomen: Soft. Non-tender. Non-distended. Bowel sounds positive. No organomegaly or masses. No pain on palpation    Extremities:  Peripheral pulses present. No peripheral edema. No ulcers. Neurologic:  Alert pleasant and conversant. Essentially body wide tremors appreciated. Very hard of hearing which prohibits much meaningful discussion. Skin:  No petechia. No hemorrhage. No wounds. DISPOSITION:  The patient's condition is fair. At this time the patient is without objective evidence of an acute process requiring continuing hospitalization or inpatient management. They are stable for discharge with outpatient follow-up. I have spoken with the patient and adult daughter and discussed the results of the current hospitalization, in addition to providing specific details for the plan of care and counseling regarding the diagnosis and prognosis. The plan has been discussed in detail and they are aware of the specific conditions for emergent return, as well as the importance of follow-up.   Their questions are answered at this time and they are agreeable with the plan for discharge to home    DISCHARGE MEDICATIONS:    [facility]   Home Medication Instructions MONCHO:[id number]    Printed on:08/14/21 4001   Medication Information                      ammonium lactate (LAC-HYDRIN) 12 % lotion  Apply topically as needed for Dry Skin Apply topically as needed. aspirin 81 MG chewable tablet  Take 81 mg by mouth daily             [employer] VITAMIN A PO  Take 2,400 mcg by mouth Daily             donepezil (ARICEPT) 10 MG tablet  Take 10 mg by mouth nightly. enalapril (VASOTEC) 2.5 MG tablet  Take 2.5 mg by mouth daily. mineral oil-hydrophilic petrolatum (HYDROPHOR) ointment  Apply topically as needed for Dry Skin Apply topically as needed. pantoprazole (PROTONIX) 40 MG tablet  Take 1 tablet by mouth daily (with breakfast)             polyethylene glycol (MIRALAX) PACK packet  Take 17 g by mouth 2 times daily             pravastatin (PRAVACHOL) 40 MG tablet  Take 20 mg by mouth daily Take one half tablet at bedtime. primidone (MYSOLINE) 50 MG tablet  Take 50 mg by mouth 2 times daily. sertraline (ZOLOFT) 25 MG tablet  Take 25 mg by mouth daily             vitamin D 1000 UNITS CAPS  Take by mouth daily                  FOLLOW UP/INSTRUCTIONS:  · This patient is instructed to follow-up with his primary care physician. · Patient is instructed to follow-up with the consults listed above as directed by them. · he is instructed to resume home medications and take new medications as indicated in the list above. · If the patient has a recurrence of symptoms, he is instructed to go to the ED. Preparing for this patient's discharge, including paperwork, orders, instructions, and meeting with patient did require > 40 minutes.     STEPHON Hoover CNP     8/14/2021  9:27 AM

## 2021-08-14 NOTE — PROGRESS NOTES
PATIENT IV D/YOSHI DAUGHTER GIVEN DISCHARGE INSTRUCTIOS, VERBALIZED UNDERSTANDING. TO DISCHARGE AREA BY TRANSPORT HOME WITH DAUGHTER PER CAR. IV DISCONTINUED.  PATIENT STABLE

## 2021-08-14 NOTE — PROGRESS NOTES
PROGRESS NOTE    By Olegario Lee D.O GI Fellow    [facility]  Dr. Virginia Lea MD, Dr. La Reyes MD, Dr Rambo Chino, Dr. Wendie Parks MD, Dr. Clifton Angeles, DO      Dana Oppenheim  80 y.o.  male    SUBJECTIVE    Resting comfortably. OBJECTIVE:    BP (!) 179/72   Pulse 67   Temp 97.8 °F (36.6 °C) (Oral)   Resp 16   Ht 6' (1.829 m)   Wt 135 lb (61.2 kg)   SpO2 94%   BMI 18.31 kg/m²     Gen: NAD,   HEENT:PEERL, no icterus  Heart: RRR, no M/R/G  Lungs: CTAB  Abd.: soft, NT, ND, BS +, no G/R, no HSM  Extr.: no C/C/E, no bruising         Lab Results   Component Value Date    WBC 6.0 08/13/2021    WBC 10.5 08/13/2021    WBC 5.2 06/07/2021    HGB 8.3 08/13/2021    HGB 6.8 08/13/2021    HGB 7.3 08/13/2021    HCT 25.8 08/13/2021    MCV 96.5 08/13/2021    RDW 14.8 08/13/2021    PLT 94 08/13/2021     08/13/2021     06/07/2021     Lab Results   Component Value Date     08/13/2021    K 4.5 08/13/2021    K 4.9 08/13/2021     08/13/2021    CO2 24 08/13/2021    BUN 35 08/13/2021    CREATININE 1.1 08/13/2021    CALCIUM 8.3 08/13/2021    PROT 7.2 08/13/2021    LABALBU 3.7 08/13/2021    BILITOT 0.3 08/13/2021    BILITOT 0.3 08/13/2021    BILITOT 0.3 06/07/2021    ALKPHOS 110 08/13/2021    ALKPHOS 119 08/13/2021    ALKPHOS 115 06/07/2021    AST 22 08/13/2021    AST 22 08/13/2021    AST 22 06/07/2021    ALT 8 08/13/2021    ALT 9 08/13/2021    ALT 12 06/07/2021     Lab Results   Component Value Date    LIPASE 51 02/02/2018     No results found for: AMYLASE      ASSESSMENT/PLAN:    1.   Acute on chronic anemia  -Vital signs stable no overt signs of GI bleed on exam  -Baseline hemoglobin 10.1 in 2018  -Hemoglobin 7.3 this a.m.  -Protonix 40mg IV BID/Type and Cross 2 Units on hold/Elevate HOB 30 Degrees/q 6 Hr Hgb transfuse for Hgb less than 7.0 per admitting  -EGD in 11/20/2019   esophagitis present  -Patient did not wish to pursue colonoscopy in 2018     Discussion for possible EGD   was discussed with patient's power of  Andrews Bray in detail including risks and benefits. .in case of continuous decrease in hemoglobin/ongoing bleeding, patient is at risk for worsening clinical condition and death  -she voiced understanding and states she does not want invasive procedures including endoscopy.       Pt was discussed with Dr. Ricci Palmer DO  GI Fellow   8/14/2021  8:33 AM

## 2021-09-01 ENCOUNTER — OFFICE VISIT (OUTPATIENT)
Dept: FAMILY MEDICINE CLINIC | Age: 86
End: 2021-09-01
Payer: MEDICARE

## 2021-09-01 VITALS
BODY MASS INDEX: 18.28 KG/M2 | SYSTOLIC BLOOD PRESSURE: 116 MMHG | HEIGHT: 72 IN | TEMPERATURE: 96.8 F | RESPIRATION RATE: 16 BRPM | WEIGHT: 135 LBS | HEART RATE: 62 BPM | DIASTOLIC BLOOD PRESSURE: 65 MMHG

## 2021-09-01 DIAGNOSIS — T14.8XXA MULTIPLE SKIN TEARS: Primary | ICD-10-CM

## 2021-09-01 PROCEDURE — 99213 OFFICE O/P EST LOW 20 MIN: CPT | Performed by: NURSE PRACTITIONER

## 2021-09-01 SDOH — ECONOMIC STABILITY: FOOD INSECURITY: WITHIN THE PAST 12 MONTHS, THE FOOD YOU BOUGHT JUST DIDN'T LAST AND YOU DIDN'T HAVE MONEY TO GET MORE.: NEVER TRUE

## 2021-09-01 SDOH — ECONOMIC STABILITY: FOOD INSECURITY: WITHIN THE PAST 12 MONTHS, YOU WORRIED THAT YOUR FOOD WOULD RUN OUT BEFORE YOU GOT MONEY TO BUY MORE.: NEVER TRUE

## 2021-09-01 ASSESSMENT — PATIENT HEALTH QUESTIONNAIRE - PHQ9
SUM OF ALL RESPONSES TO PHQ QUESTIONS 1-9: 0
SUM OF ALL RESPONSES TO PHQ QUESTIONS 1-9: 0
2. FEELING DOWN, DEPRESSED OR HOPELESS: 0
SUM OF ALL RESPONSES TO PHQ9 QUESTIONS 1 & 2: 0
SUM OF ALL RESPONSES TO PHQ QUESTIONS 1-9: 0
1. LITTLE INTEREST OR PLEASURE IN DOING THINGS: 0

## 2021-09-01 ASSESSMENT — ENCOUNTER SYMPTOMS
DIARRHEA: 0
WHEEZING: 0
VOMITING: 0
ROS SKIN COMMENTS: SEE HPI
CONSTIPATION: 0
NAUSEA: 0
SHORTNESS OF BREATH: 0
COUGH: 0

## 2021-09-01 ASSESSMENT — SOCIAL DETERMINANTS OF HEALTH (SDOH): HOW HARD IS IT FOR YOU TO PAY FOR THE VERY BASICS LIKE FOOD, HOUSING, MEDICAL CARE, AND HEATING?: NOT HARD AT ALL

## 2021-09-01 NOTE — PROGRESS NOTES
Chief Complaint   Patient presents with   Flynn Clayton Fall     Has skin tears on left arm and shoulder from fall on Monday       HPI:  Patient presents today for  Multiple skin tears to his left forearm that occurred 2 days ago while he was at home. He is with his daughter today who lives with him. She reports that he was at home and bent over to pick something up and fell over. He then used his arm to help pull himself up. The daughter has been placing a dry guaze dressing on the area. She tells me that the patient will take it off before she even realized what is going on. She has had to change the dressing several times since this occurred d/t bleeding. He is on a baby asa, no other blood thinners. He also has a tear to his left upper shoulder that is healing well. Denies any concern for fracture to this arm or shoulder. He is able to move his arm and shoulder without any pain. Prior to Visit Medications    Medication Sig Taking? Authorizing Provider   pantoprazole (PROTONIX) 40 MG tablet Take 1 tablet by mouth daily (with breakfast) Yes STEPHON Bonds - CNP   sertraline (ZOLOFT) 25 MG tablet Take 25 mg by mouth daily Yes Historical Provider, MD   [employer]S VITAMIN A PO Take 2,400 mcg by mouth Daily Yes Historical Provider, MD   polyethylene glycol (MIRALAX) PACK packet Take 17 g by mouth 2 times daily Yes Historical Provider, MD   ammonium lactate (LAC-HYDRIN) 12 % lotion Apply topically as needed for Dry Skin Apply topically as needed. Yes Historical Provider, MD   mineral oil-hydrophilic petrolatum (HYDROPHOR) ointment Apply topically as needed for Dry Skin Apply topically as needed. Yes Historical Provider, MD   aspirin 81 MG chewable tablet Take 81 mg by mouth daily Yes Historical Provider, MD   vitamin D 1000 UNITS CAPS Take by mouth daily  Yes Historical Provider, MD   pravastatin (PRAVACHOL) 40 MG tablet Take 20 mg by mouth daily Take one half tablet at bedtime.   Yes Historical Provider, MD   enalapril (VASOTEC) 2.5 MG tablet Take 2.5 mg by mouth daily. Yes Historical Provider, MD   primidone (MYSOLINE) 50 MG tablet Take 50 mg by mouth 2 times daily. Yes Historical Provider, MD   donepezil (ARICEPT) 10 MG tablet Take 10 mg by mouth nightly. Yes Historical Provider, MD         Allergies   Allergen Reactions    Ativan [Lorazepam] Other (See Comments)     Per family- makes pt more agitated and anxious    Clindamycin/Lincomycin     Pcn [Penicillins]          Review of Systems  Review of Systems   Constitutional: Negative for chills and fever. HENT: Negative for congestion and nosebleeds. Respiratory: Negative for cough, shortness of breath and wheezing. Cardiovascular: Negative for chest pain, palpitations and leg swelling. Gastrointestinal: Negative for constipation, diarrhea, nausea and vomiting. Genitourinary: Negative for dysuria and urgency. Musculoskeletal: Negative for neck pain. Skin: Positive for wound. See HPI      Neurological: Negative for headaches. VS:  /65 (Site: Right Upper Arm, Position: Sitting, Cuff Size: Medium Adult)   Pulse 62   Temp 96.8 °F (36 °C) (Temporal)   Resp 16   Ht 6' (1.829 m)   Wt 135 lb (61.2 kg)   BMI 18.31 kg/m²     Patient's medical, social, and family history reviewed      Physical Exam  Physical Exam  Constitutional:       Appearance: He is well-developed. HENT:      Head: Normocephalic. Eyes:      Pupils: Pupils are equal, round, and reactive to light. Neck:      Thyroid: No thyromegaly. Cardiovascular:      Rate and Rhythm: Normal rate and regular rhythm. Pulmonary:      Effort: Pulmonary effort is normal.      Breath sounds: Normal breath sounds. Abdominal:      General: Bowel sounds are normal.      Palpations: Abdomen is soft. Musculoskeletal:         General: Normal range of motion. Cervical back: Normal range of motion and neck supple. Lymphadenopathy:      Cervical: No cervical adenopathy.    Skin: Comments: Multiple skin tears to left forearm. Skin tear to left shoulder    Neurological:      Mental Status: He is alert and oriented to person, place, and time. Assessment/Plan:    1. Multiple skin tears  Wounds cleansed with NSS  Bacitracin and non adherent guaze applied to tears. Dressings held in place with kerlex and outer layer of coban  Daughter instructed how to change dressing daily or prn  Patient referred to wound care  - [address] [place] [address] Road      Return if symptoms worsen or fail to improve.         Layton Sosa, APRN - CNP

## 2021-09-03 PROBLEM — T14.8XXA MULTIPLE SKIN TEARS: Status: ACTIVE | Noted: 2021-01-01

## 2021-09-03 NOTE — PROGRESS NOTES
[facility] Comprehensive History and Physical      CHIEF COMPLAINT:    Chief Complaint   Patient presents with    Wound Check     left arm          The Story of the Wound Debilitated and demented 79 YO man with multiple skin tears Left forearm    The patient is a 80 y.o. male patient of Haris Kang DO  who presents with  a ulceration due to tearing skin   wound which is located on the arm Current symptoms include pain: mild. Pain is rated 1/10.      Past Medical History:    Past Medical History:   Diagnosis Date    CAD (coronary artery disease)     Cerebral artery occlusion with cerebral infarction ([address]     Hemorrhage     right eye    Raynaud disease     Tremors of nervous system        Past Surgical History:    Past Surgical History:   Procedure Laterality Date    CORONARY ANGIOPLASTY WITH STENT PLACEMENT      UPPER GASTROINTESTINAL ENDOSCOPY N/A 11/13/2019    EGD BIOPSY performed by Susan Olivarez MD at [facility] ENDOSCOPY       Allergies:    Ativan [lorazepam], Clindamycin/lincomycin, and Pcn [penicillins]    Labs:   CBC with Differential:    Lab Results   Component Value Date    WBC 3.8 08/14/2021    RBC 3.27 08/14/2021    HGB 9.9 08/14/2021    HCT 31.4 08/14/2021    PLT 96 08/14/2021    MCV 96.0 08/14/2021    MCH 30.3 08/14/2021    MCHC 31.5 08/14/2021    RDW 14.6 08/14/2021    LYMPHOPCT 26.1 08/14/2021    MONOPCT 4.3 08/14/2021    BASOPCT 0.3 08/14/2021    MONOSABS 0.15 08/14/2021    LYMPHSABS 0.99 08/14/2021    EOSABS 0.03 08/14/2021    BASOSABS 0.00 08/14/2021     CMP:    Lab Results   Component Value Date     08/14/2021    K 4.2 08/14/2021    K 4.9 08/13/2021     08/14/2021    CO2 21 08/14/2021    BUN 24 08/14/2021    CREATININE 1.0 08/14/2021    GFRAA >60 08/14/2021    LABGLOM >60 08/14/2021    GLUCOSE 113 08/14/2021    PROT 7.2 08/13/2021    LABALBU 3.7 08/13/2021    CALCIUM 8.5 08/14/2021    BILITOT 0.3 08/13/2021    ALKPHOS 110 08/13/2021    AST 22 08/13/2021    ALT 8 08/13/2021     BMP:    Lab Results   Component Value Date     08/14/2021    K 4.2 08/14/2021    K 4.9 08/13/2021     08/14/2021    CO2 21 08/14/2021    BUN 24 08/14/2021    LABALBU 3.7 08/13/2021    CREATININE 1.0 08/14/2021    CALCIUM 8.5 08/14/2021    GFRAA >60 08/14/2021    LABGLOM >60 08/14/2021    GLUCOSE 113 08/14/2021     Hepatic Function Panel:    Lab Results   Component Value Date    ALKPHOS 110 08/13/2021    ALT 8 08/13/2021    AST 22 08/13/2021    PROT 7.2 08/13/2021    BILITOT 0.3 08/13/2021    BILIDIR <0.2 05/12/2018    IBILI see below 05/12/2018    LABALBU 3.7 08/13/2021     Uric Acid:    Lab Results   Component Value Date    LABURIC 6.0 08/13/2021     PT/INR:    Lab Results   Component Value Date    PROTIME 12.5 08/13/2021    INR 1.1 08/13/2021     Warfarin PT/INR:  No components found for: PTPATWAR, PTINRWAR  PTT:    Lab Results   Component Value Date    APTT 23.3 08/13/2021   [APTT}  U/A:    Lab Results   Component Value Date    COLORU Yellow 08/13/2021    PROTEINU TRACE 08/13/2021    PHUR 6.0 08/13/2021    LABCAST MODERATE 08/07/2015    WBCUA 2-5 08/13/2021    RBCUA NONE 08/13/2021    MUCUS Present 08/07/2015    BACTERIA FEW 08/13/2021    CLARITYU Clear 08/13/2021    SPECGRAV 1.020 08/13/2021    LEUKOCYTESUR Negative 08/13/2021    UROBILINOGEN 0.2 08/13/2021    BILIRUBINUR Negative 08/13/2021    BLOODU Negative 08/13/2021    GLUCOSEU Negative 08/13/2021     HgBA1c:    Lab Results   Component Value Date    LABA1C 5.3 08/13/2021     Microalbumen/Creatinine ratio:  No components found for: RUCREAT      Medications Prior to Admission:    Not in a hospital admission. Social History:    reports that he quit smoking about 45 years ago. He has never used smokeless tobacco. He reports previous alcohol use. He reports that he does not use drugs. Family History:   family history is not on file.     REVIEW OF SYSTEMS:  Review of Systems     PHYSICAL EXAM:    Vitals:  Vitals:    09/03/21 1049 BP: 100/70   Pulse: 66   Resp: 16   Temp: 97.1 °F (36.2 °C)       General:  Awake, alert, oriented X 3. Well developed, well nourished, 80 y.o. male. No apparent distress. HEENT:  Normocephalic, atraumatic. Pupils equal, round, reactive to light. No scleral icterus. No conjunctival injection. Normal lips, teeth, and gums. No nasal discharge. Neck:  Supple  Lungs:  CTA bilaterally, bilat symmetrical expansion, no wheeze, rales, or rhonchi  Heart:  RRR, no murmurs, gallops, rubs  Abdomen: Bowel sounds present, soft, nontender, no masses, no organomegaly, no peritoneal signs  Extremities:  negative   Skin:  Warm and dry, satisfactory turgor   Neuro:  Cranial nerves 2-12 intact, no focal deficits. Wound Metric Flow:   /70   Pulse 66   Temp 97.1 °F (36.2 °C) (Temporal)   Resp 16   Ht 5' 9\" (1.753 m)   Wt 135 lb (61.2 kg)   BMI 19.94 kg/m²   Wound serous exudate noted      Wound 09/03/21 Shoulder Left #1 (Active)   Wound Image   09/03/21 1100   Dressing Status New dressing applied 09/03/21 1258   Wound Cleansed Irrigated with saline 09/03/21 1258   Dressing/Treatment Xeroform;ABD 09/03/21 1258   Wound Length (cm) 3.7 cm 09/03/21 1055   Wound Width (cm) 5.6 cm 09/03/21 1055   Wound Depth (cm) 0.1 cm 09/03/21 1055   Wound Surface Area (cm^2) 20.72 cm^2 09/03/21 1055   Wound Volume (cm^3) 2.072 cm^3 09/03/21 1055   Wound Assessment Purple/maroon;Dry 09/03/21 1055   Drainage Amount None 09/03/21 1055   Mine-wound Assessment Ecchymosis;Fragile 09/03/21 1055   Number of days: 0       Wound 09/03/21 Arm Left; Lower #2 block (Active)   Wound Image   09/03/21 1100   Dressing Status New dressing applied 09/03/21 1258   Wound Cleansed Irrigated with saline 09/03/21 1258   Dressing/Treatment Xeroform;ABD 09/03/21 1258   Wound Length (cm) 14.5 cm 09/03/21 1100   Wound Width (cm) 6.3 cm 09/03/21 1100   Wound Depth (cm) 0.1 cm 09/03/21 1100   Wound Surface Area (cm^2) 91.35 cm^2 09/03/21 1100   Wound Volume (cm^3) 9.135 cm^3 09/03/21 1100   Wound Assessment Pink/red;Bleeding 09/03/21 1100   Drainage Amount Moderate 09/03/21 1100   Drainage Description Sanguinous 09/03/21 1100   Odor None 09/03/21 1100   Mine-wound Assessment Fragile;Ecchymosis 09/03/21 1100   Number of days: 0               Procedure: None      Active Problems: Thrombocytopenia ([address]    Protein-calorie malnutrition, moderate ([address]    Delirium secondary to multiple medical problems    Multiple skin tears  Resolved Problems:    * No resolved hospital problems. *       Diagnosis:           Multiple skin tears   @[email or web address])@              Plan:  1. H&P, General medical evaluation for the purpose of Comprehensive wound management for maximum healing and minimal morbidity. 2. Xeroform and spandagrip  3. We had a lengthy discussion about the diagnosis and aspects  to consider.          Ila Mejia DO       9/3/2021    1:11 PM

## 2021-09-03 NOTE — PROGRESS NOTES
[address] Floor:     [facility] [id number] [address] p: [phone] f: [phone]     Ordering Center:     [address] [place] [address] Road  [address] [place] [address]  Monik Hernández [address]  [phone]  WOUND CARE Dept: Marilyn Lewis GTSVKJ [phone]    Patient Information:      Cathy   Kel [place]kes [place] [address]   [phone]   : 1925  AGE: 80 y.o. GENDER: male   EPISODE DATE: 9/3/2021    Insurance:      PRIMARY INSURANCE:  Plan: [employer] ESSENTIAL/PLUS  Coverage: BCBS MEDICARE  Effective Date: 2016  Group Number: [unfilled]  Subscriber Number: [id number] - (Medicare Managed)    Payor/Plan Subscr  Sex Relation Sub. Ins. ID Effective Group Num   1. BCBS MEDICARELarkin Oddi 1925 Male Self [id number]                                    [id number]       Patient Wound Information:      Problem List Items Addressed This Visit     Multiple skin tears          WOUNDS REQUIRING DRESSING SUPPLIES:     Wound 21 Shoulder Left #1 (Active)   Wound Image   21 1100   Dressing Status New dressing applied 21 1258   Wound Cleansed Irrigated with saline 21 1258   Dressing/Treatment Xeroform;ABD 21 1258   Wound Length (cm) 3.7 cm 21 1055   Wound Width (cm) 5.6 cm 21 1055   Wound Depth (cm) 0.1 cm 21 1055   Wound Surface Area (cm^2) 20.72 cm^2 21 1055   Wound Volume (cm^3) 2.072 cm^3 21 1055   Wound Assessment Purple/maroon 21 1100   Drainage Amount Moderate 21 1100   Drainage Description Sanguinous 21 1100   Odor None 21 1100   Mine-wound Assessment Ecchymosis;Fragile 21 1055   Number of days: 0       Wound 21 Arm Left; Lower #2 block (Active)   Wound Image   21 1100   Dressing Status New dressing applied 21 1258   Wound Cleansed Irrigated with saline 21 1258   Dressing/Treatment Xeroform;ABD 09/03/21 1258   Wound Length (cm) 14.5 cm 09/03/21 1100   Wound Width (cm) 6.3 cm 09/03/21 1100   Wound Depth (cm) 0.1 cm 09/03/21 1100   Wound Surface Area (cm^2) 91.35 cm^2 09/03/21 1100   Wound Volume (cm^3) 9.135 cm^3 09/03/21 1100   Wound Assessment Pink/red;Bleeding 09/03/21 1100   Drainage Amount Moderate 09/03/21 1100   Drainage Description Sanguinous 09/03/21 1100   Odor None 09/03/21 1100   Mine-wound Assessment Fragile;Ecchymosis 09/03/21 1100   Number of days: 0          Supplies Requested :      WOUND #: 1 and 2   PRIMARY DRESSING:  Other: Xeroform   Cover and Secure with: ABD pad  Bulky roll gauze  Other tape     FREQUENCY OF DRESSING CHANGES:  Daily     ADDITIONAL ITEMS:  [] Gloves Small  [x] Gloves Medium [] Gloves Large [] Gloves XLarge  [] Tape 1\" [x] Tape 2\" [] Tape 3\"  [] Medipore Tape  [x] Saline  [] Skin Prep   [] Adhesive Remover   [] Cotton Tip Applicators   [] Other:    Patient Wound(s) Debrided: [x] Yes if yes please add date 9/3/2021   [] No    Debribement Type: Excisional/Sharp    Patient currently being seen by Home Health: [] Yes   [x] No    Duration for needed supplies:  []15  []30  []60  [x]90 Days    Electronically signed by Jackelin Marin on 9/3/2021 at 2:17 PM     Provider Information:      PROVIDER'S NAME: Ofelia Beaver DO    NPI: [id number]

## 2021-09-20 NOTE — ED PROVIDER NOTES
HPI:  9/20/21,   Time: 6:44 PM EDT         Cathy Romano is a 80 y.o. male presenting to the ED for itchy rash on the back of neck and occasionally on the legs, beginning 2 weeks ago. The caregiver did find tick on him but it was not embedded. ROS:   Pertinent positives and negatives are stated within HPI, all other systems reviewed and are negative.  --------------------------------------------- PAST HISTORY ---------------------------------------------  Past Medical History:  has a past medical history of CAD (coronary artery disease), Cerebral artery occlusion with cerebral infarction ([facility] [address] Hemorrhage, Raynaud disease, and Tremors of nervous system. Past Surgical History:  has a past surgical history that includes Coronary angioplasty with stent and Upper gastrointestinal endoscopy (N/A, 11/13/2019). Social History:  reports that he quit smoking about 45 years ago. He has never used smokeless tobacco. He reports previous alcohol use. He reports that he does not use drugs. Family History: family history is not on file. The patients home medications have been reviewed. Allergies: Ativan [lorazepam], Clindamycin/lincomycin, and Pcn [penicillins]    -------------------------------------------------- RESULTS -------------------------------------------------  All laboratory and radiology results have been personally reviewed by myself   LABS:  No results found for this visit on 09/20/21. RADIOLOGY:  Interpreted by Radiologist.  No orders to display       ------------------------- NURSING NOTES AND VITALS REVIEWED ---------------------------   The nursing notes within the ED encounter and vital signs as below have been reviewed.    BP (!) 119/59   Pulse 65   Temp 97.7 °F (36.5 °C) (Temporal)   Ht 5' 9\" (1.753 m)   Wt 135 lb (61.2 kg)   SpO2 95%   BMI 19.94 kg/m²   Oxygen Saturation Interpretation: Normal      ---------------------------------------------------PHYSICAL Take by mouth daily    Modified Medications    No medications on file   Discontinued Medications    No medications on file           Counseling: The emergency provider has spoken with the patient and family member patient and daughter and discussed todays results, in addition to providing specific details for the plan of care and counseling regarding the diagnosis and prognosis. Questions are answered at this time and they are agreeable with the plan.      --------------------------------- IMPRESSION AND DISPOSITION ---------------------------------    IMPRESSION  1.  Contact dermatitis, unspecified contact dermatitis type, unspecified trigger        DISPOSITION  Disposition: Discharge to home  Patient condition is good                 Kiersten Lovell MD  09/20/21 2976

## 2021-10-13 PROBLEM — S72.002A CLOSED LEFT HIP FRACTURE, INITIAL ENCOUNTER (HCC): Status: ACTIVE | Noted: 2021-01-01

## 2021-10-13 NOTE — ED NOTES
Sent to [facility] ER in [place] via [facility]. Jah Kahn NP spoke with ER Dr. Chika Rodriguez requested Abelardo Gilford. Family here and aware of transfer.      SUSAN Lombardi  35/83/91 203 [facility]  96/72/57 0182

## 2021-10-13 NOTE — ED PROVIDER NOTES
[10/13/21 1019]   BP Temp Temp Source Pulse Resp SpO2 Height Weight   (!) 165/63 97.3 °F (36.3 °C) Infrared 64 20 91 % -- 145 lb (65.8 kg)       Physical Exam  · Constitutional/General: Alert , pleasant, in a wheelchair-- history given by the son  · HEENT:  NC/NT. · Neck: Supple, full ROM, non tender to palpation in the midline,   · Respiratory: Lungs clear to auscultation bilaterally, no wheezes, rales, or rhonchi. Not in respiratory distress  · CV:  Regular rate. Regular rhythm. · Musculoskeletal: Not able to move the left leg without pain. · Integument: skin warm and dry. No rashes. · Lymphatic: no lymphadenopathy noted  · Neurologic: GCS 15, no focal deficits,  · Psychiatric: Normal Affect    Lab / Imaging Results   (All laboratory and radiology results have been personally reviewed by myself)  Labs:  No results found for this visit on 10/13/21. Imaging: All Radiology results interpreted by Radiologist unless otherwise noted. XR FEMUR LEFT (MIN 2 VIEWS)   Final Result   1. Acute oblique fracture through the left subcapital region, with up to 2.5   cm of override/superomedial displacement of the left femoral shaft relative   to the left femoral head, contributing to moderate \"bearden's crook\"   configuration, further accompanied by mild surrounding soft tissue swelling. 2. Osteopenia and osseous degenerative disease, as described. 3.  Vascular calcifications, as described. .      RECOMMENDATION:   1. Recommend orthopedic consultation, with post-treatment follow-up imaging,   as directed clinically. .             ED Course / Medical Decision Making   Medications - No data to display       Consult(s):   None        MDM:   Patient fell in the kitchen this morning the son lifted him up and put him in the car and brought him to urgent care. Patient is not able to bear weight at all on that left leg.   We did obtain an x-ray of the left femur the patient states the pain is in the mid lower to lower femur area and he does have a hip fracture. I did discuss this with the son I said he will need to go to the emergency department. I said I would prefer he be transferred by ambulance to keep the hip stabilized. Son wants him to go to another doctor's office or to a outpatient surgery center he does not want him to go to the emergency department. I did tell the son that I do not think any of those places would accept transfer for an acute hip fracture. The son did call some  places and initially said he wanted him transferred to a doctor's office. I did tell him that I did not believe a doctor's office would accept an ambulance he did discuss with other family members and they decided to allow him to be transferred to the emergency department. They request to go to [facility]. They are requesting pain medication for him but his oxygen level was running on the low end 92% I did tell the son that he would need to wait until he gets in the ambulance or to the emergency department for pain medication since we do not have any way to give IV pain medication here. And with his oxygen level being 92 he would need to be monitored if he does get anything for pain. I did call and discussed with the ED attending Dr. Leonora Pfeiffer. He said they would accept the transfer but to inform the son that there waiting time for a bed can be hours. I did inform the son of that and he is continues to agree to transfer to [facility]. EMS was called he was transferred to [facility] via ambulance. Assessment      1. Fall, initial encounter    2. Closed fracture of left hip, initial encounter [facility])      Plan   Transferred to [facility] via ambulance    New Medications     Discharge Medication List as of 10/13/2021 11:54 AM        Electronically signed by STEPHON Amezquita CNP   DD: 10/13/21  **This report was transcribed using voice recognition software.  Every effort was made to ensure accuracy; however, inadvertent computerized transcription errors may be present.   END OF ED PROVIDER NOTE     Esther Caro, APRN - CNP  10/13/21 2009

## 2021-10-13 NOTE — LETTER
Address: [address] [place] [address] Sex: Male   [place] city: [place] [facility] [address]         Marital Status:    Employer: RETIRED         Gnosticist: Mormon   Primary Care Provider: My Siu DO         Primary Phone: [phone]   EMERGENCY CONTACT   Contact Name Legal Guardian? Relationship to Patient Home Phone Work Phone   1. Fabienne Snyder  2. ClaudioKris No  No Child  Other [phone]              GUARANTOR            Guarantor: Tia Sutton     : 1925   Address: [address] [place] [address] Sex: Male     [place] [address]     Relation to Patient: Self       Home Phone: [phone]   Guarantor ID: [id number]       Work Phone:     Guarantor Employer: RETIRED         Status: RETIRED      COVERAGE        PRIMARY INSURANCE   Payor: [facility] MEDICARE Plan: ANTHEM MEDIBLUE ESSENTIA*   Payor Address: [facility] [id number] [place] [address]       Group Number: [id number] Type: INDEMNITY   Subscriber Name: Sai Butterfield : 1925   Subscriber ID: [id number] [facility] Boxer. Rel. to Sub: Self   SECONDARY INSURANCE   Payor:   Plan:     Payor Address:  ,           Group Number:   Insurance Type:     Subscriber Name:   Subscriber :     Subscriber ID:   Pat.  Rel. to Sub:             CSN: [id number]

## 2021-10-13 NOTE — CONSULTS
Department of Orthopedic Surgery  Resident Consult Note          Reason for Consult: Left hip pain    HISTORY OF PRESENT ILLNESS:       Patient is a 80 y.o. male who presents with his son and daughter after fall at home earlier today. They previously went to the urgent care where the patient was found to have a displaced left femoral neck fracture. They were subsequently transported here for further care and management. Orthopedic surgery was subsequently consulted. Timing the patient is a he is not able to provide adequate history. All of the subjective history is given by his children. They state that he fell at home in the kitchen earlier today directly onto his left side and was able to get up or bear weight. They state that he previously walked with a cane. They state that he has a history of tremors, has been evaluated for Parkinson's but was never diagnosed. They also tell me that he had a MI approximately 10 to 15 years ago with subsequent stenting. He has had no cardiovascular problems since then. Denies numbness/tingling/paresthesias. Denies any other orthopedic complaints at this time. Past Medical History:        Diagnosis Date    CAD (coronary artery disease)     [facility]erebral [facility] occlusion with cerebral infarction [facility] [address]     Hemorrhage     right eye    Raynaud disease     Tremors of nervous system      Past Surgical History:        Procedure Laterality Date    CORONARY ANGIOPLASTY WITH STENT PLACEMENT      UPPER GASTROINTESTINAL ENDOSCOPY N/A 11/13/2019    EGD BIOPSY performed by Trent Tenorio MD at [facility] ENDOSCOPY     Current Medications:   Current Facility-Administered Medications: 0.9 % sodium chloride infusion, , IntraVENous, Continuous  sodium chloride flush 0.9 % injection 10 mL, 10 mL, IntraVENous, Once  Allergies:  Ativan [lorazepam], Clindamycin/lincomycin, and Pcn [penicillins]    Social History:   TOBACCO:   reports that he quit smoking about 45 years ago.  His smoking use included cigarettes. He has never used smokeless tobacco.  ETOH:   reports previous alcohol use. DRUGS:   reports no history of drug use. ACTIVITIES OF DAILY LIVING:    OCCUPATION:    Family History:   No family history on file. REVIEW OF SYSTEMS:  Unable to be obtained due to patient status    PHYSICAL EXAM:    VITALS:  /88   Pulse 61   Temp 98.3 °F (36.8 °C) (Oral)   Resp 17   SpO2 91%   CONSTITUTIONAL: Awake, tremoring, alert to self, frequently asking to get up and leave the ED, difficulty participating in exam  MUSCULOSKELETAL:  Left lower Extremity:  · Skin circumferentially intact with no superficial lacerations or abrasions. No evident drainage or erythema noted. · TTP about the proximal hip. No tenderness palpation about the toes, foot, ankle, shin, knee, thigh  · Sensation tact light touch distally in sural, saphenous, tibial, deep and superficial peroneal nerve distributions. · Motor function grossly intact distally in tibial, deep and superficial peroneal nerve distributions. · Compartments soft and compressible  · +2/4 DP and PT pulses, foot warm and well-perfused    Secondary Exam:   · bilateralUE: No obvious signs of trauma. -TTP to fingers, hand, wrist, forearm, elbow, humerus, shoulder or clavicle. · rightLE: No obvious signs of trauma. -TTP to foot, ankle, leg, knee, thigh, hip. · Pelvis: -TTP, +Log roll on the left, +Heel strike on the left    DATA:    CBC:   Lab Results   Component Value Date    WBC 10.3 10/13/2021    RBC 2.77 10/13/2021    HGB 8.5 10/13/2021    HCT 26.8 10/13/2021    MCV 96.8 10/13/2021    MCH 30.7 10/13/2021    MCHC 31.7 10/13/2021    RDW 14.4 10/13/2021     10/13/2021    MPV 11.0 10/13/2021     PT/INR:    Lab Results   Component Value Date    PROTIME 12.1 10/13/2021    INR 1.1 10/13/2021       Radiology Review:  XR 6 views of the left femur and left hip reviewed demonstrating a displaced left femoral neck fracture.  Advanced atherosclerotic disease noted. Osteopenia noted. No other acute fracture dislocations. No other bony or soft tissue abnormalities noted. IMPRESSION:  · Closed, left femoral neck fracture    PLAN:  · Nonweightbearing left lower extremity  · Admission per ED  · Pain control per ED  · Antibiotics preoperatively for surgical prophylaxis  · DVT Prophylaxis following surgery  · Plan for hemihip arthroplasty tomorrow with Dr. Bobby Servin  · N.p.o. at midnight  · Hold anticoagulants  · Medical management appreciated  · All patient/family questions have been answered and patient is currently agreeable to plan. · Discuss with Attending       Electronically signed by Lars Huerta DO on 10/13/2021 at 7:31 PM       I have seen and evaluated the patient and agree with the above assessment on today's visit. I have performed the key components of the history and physical examination and concur completely with the findings and plans as documented. Agree with ROS, examination, FMH, PMH, PSH, SocHx, and allergies as above. Patient physically seen and examined. Patient with a left displaced femoral neck fracture. History is obtained from the patient's children. Patient does have some dementia tremors and ambulated minimally with a cane. I explained the surgery in detail. I explained the risks and complications of surgery with the patient including but not limited to death from anesthesia, possible neurovascular damage, possible infection,  Possible wound healing issues, possible perioperative fracture, leg length discrepancy, implant failure, possible need for further surgery, etc.  Patient understood this, asked appropriate questions and decided to go forward with the procedure. Therefore plan for left hip hemiarthroplasty in the near future.       Past Medical History:   Diagnosis Date    CAD (coronary artery disease)     Cerebral artery occlusion with cerebral infarction ([facility] [address]     Hemorrhage     right eye    pulses   Sensation intact   Wiggles toes   Fires L4 L5 and S1   Pain with log roll and heel strike          ELECTRONICALLY signed by:    Yen Concepcion MD  10/14/21   This is been dictated utilizing voice recognition software. All efforts have been made to make the note accurate although inadvertent errors may be present.

## 2021-10-14 NOTE — H&P
[address] [facility]  History and Physical      CHIEF COMPLAINT:    Chief Complaint   Patient presents with    Fall     transfer from [facility] left hip frx. Patient of Phu Hinson DO presents with:  Closed left hip fracture, initial encounter [facility])    History of Present Illness:   Patient is pretty drowsy as he had just received pain medication prior to my arrival.  His daughter lives with him and provided me involved in the history. He states that yesterday he had a witnessed fall. He did not lose consciousness, he just lost his balance while using his cane. He states that he is fallen about 3 times in the last year. There are no other associated symptoms. No modifying factors. In terms of his baseline health the patient can walk about 3 house lengths. He never complains of chest pain or chest pressure with exertion. He does have a history of stroke. He is a non-smoker. Overall his daughter states that he is in pretty good health for his age but he does have a distant history of coronary artery disease with a stent placed many many years ago. REVIEW OF SYSTEMS:  Pertinent negatives are above in HPI. 10 point ROS otherwise negative. Past Medical History:   Diagnosis Date    CAD (coronary artery disease)     Cerebral artery occlusion with cerebral infarction ([address]     Hemorrhage     right eye    Raynaud disease     Tremors of nervous system          Past Surgical History:   Procedure Laterality Date    CORONARY ANGIOPLASTY WITH STENT PLACEMENT      UPPER GASTROINTESTINAL ENDOSCOPY N/A 11/13/2019    EGD BIOPSY performed by Maddie Beaver MD at [facility] ENDOSCOPY       Medications Prior to Admission:    Not in a hospital admission.     Note that the patient's home medications were reviewed and the above list is accurate to the best of my knowledge at the time of the exam.    Allergies:    Ativan [lorazepam], Clindamycin/lincomycin, and Pcn [penicillins]    Social History:    reports that he quit smoking about 45 years ago. His smoking use included cigarettes. He has never used smokeless tobacco. He reports previous alcohol use. He reports that he does not use drugs. Family History:   No fhx hip fx      PHYSICAL EXAM:    Vitals:  BP (!) 140/82   Pulse 64   Temp 98.3 °F (36.8 °C) (Oral)   Resp 16   Ht 5' 9\" (1.753 m)   Wt 145 lb 1 oz (65.8 kg)   SpO2 94%   BMI 21.42 kg/m²       General appearance: Nontoxic male a little drowsy in NAD  Eyes: Sclerae anicteric, PERRLA  HEENT: AT/NC, MMM  Neck: FROM, supple, no thyromegaly  Lymph: No cervical / supraclavicular lymphadenopathy  Lungs: Clear to auscultation, WOB normal  CV: RRR, no MRGs, no lower extremity edema  Abdomen: Soft, non-tender; no masses or HSM, +BS  Extremities: L hip with some bruising and deformity. No clubbing or cyanosis of the hands. Skin: no rash, induration, lesions, or ulcers  Psych: Calm and cooperative. Limited judgement and insight. Normal mood and affect. Neuro: Sleepy but arousable    LABS:  All labs reviewed.   Of note:  CBC with Differential:    Lab Results   Component Value Date    WBC 10.3 10/13/2021    RBC 2.77 10/13/2021    HGB 8.5 10/13/2021    HCT 26.8 10/13/2021     10/13/2021    MCV 96.8 10/13/2021    MCH 30.7 10/13/2021    MCHC 31.7 10/13/2021    RDW 14.4 10/13/2021    LYMPHOPCT 6.2 10/13/2021    MONOPCT 3.7 10/13/2021    BASOPCT 0.1 10/13/2021    MONOSABS 0.38 10/13/2021    LYMPHSABS 0.64 10/13/2021    EOSABS 0.03 10/13/2021    BASOSABS 0.01 10/13/2021     CMP:    Lab Results   Component Value Date     10/13/2021    K 4.3 10/13/2021    K 4.9 08/13/2021     10/13/2021    CO2 21 10/13/2021    BUN 28 10/13/2021    CREATININE 0.9 10/13/2021    GFRAA >60 10/13/2021    LABGLOM >60 10/13/2021    GLUCOSE 117 10/13/2021    PROT 7.9 10/13/2021    LABALBU 4.1 10/13/2021    CALCIUM 8.6 10/13/2021    BILITOT 0.4 10/13/2021    ALKPHOS 126 10/13/2021    AST 27 10/13/2021 ALT 13 10/13/2021       Imaging:  I've personally reviewed the patient's CXR  Clear    I've personally reviewed the patient's L hip  1. Acute oblique fracture through the left subcapital region, with up to 2.5 cm of override/superomedial displacement of the left femoral shaft relative to the left femoral head, contributing to moderate \"bearden's crook\" configuration, further accompanied by mild surrounding soft tissue swelling. 2. Osteopenia and osseous degenerative disease, as described. 3.  Vascular calcifications, as described. CTA chest and CT C-spine  1. When compared to Noncontrast CT of the Cervical Spine 08/13/2021, all   previously-demonstrated/described, chronic, multilevel, asymmetric vertebral   compression deformities, related vertebral column curvature/alignment   abnormalities, degenerative disease, central spinal canal stenoses, neural   foraminal narrowing, and background diffuse osteopenia, all appear not   significantly changed. 2.  Otherwise, no significant change. .   Note that there was a delay in receiving images. No evidence of pulmonary embolism. No evidence of acute aortic injury. Extensive emphysema. Extensive atherosclerotic disease. EKG:  I've personally reviewed the patient's EKG:  NSR no acute ischemic changes. RBBB    ASSESSMENT/PLAN:  Principal Problem:    Closed left hip fracture, initial encounter [facility])  Active Problems:    CAD (coronary artery disease)    Hyperlipidemia    H/O: CVA (cerebrovascular accident)    Essential hypertension    Dementia (HCC)    Thrombocytopenia (HCC)    Anemia    Gait disorder  Resolved Problems:    * No resolved hospital problems. *      OR today    Per NSQIP he is above average risk of medical complications including death, MI, CVA. I explained this to his daughter and showed her the NSQIP charts.   She understands that he is at elevated risk of complications though not prohibitive and there is no realistic alternative, as elderly patients rapidly deteriorate when bedbound. BP OK    Hold enalapril    Not sure why so many ABG's are being checked on a linden who is 94%/RA! He has mild thrombocytopenia that should be OK for surgery. Also mildly chronically anemic. Check iron studies. I wonder if he might have underlying mild MDS, given his age and cytopenias.   Will do outpatient heme referral.    Code status: Full  Requires inpatient level of care  Michelle Crisostomo MD    7:24 AM  10/14/2021

## 2021-10-14 NOTE — ANESTHESIA PRE PROCEDURE
Department of Anesthesiology  Preprocedure Note       Name:  Halina Rose   Age:  80 y.o.  :  1925                                          MRN:  [id number]         Date:  10/14/2021      Surgeon: Carter Jones):  Natty Sellers MD    Procedure: HIP HEMIARTHROPLASTY (Left )    Medications prior to admission:   Prior to Admission medications    Medication Sig Start Date End Date Taking? Authorizing Provider   aspirin 81 MG chewable tablet Take 81 mg by mouth daily   Yes Historical Provider, MD   pantoprazole (PROTONIX) 40 MG tablet Take 1 tablet by mouth daily (with breakfast) 21   Sarah Shepherd APRN - CNP   sertraline (ZOLOFT) 25 MG tablet Take 25 mg by mouth daily    Historical Provider, MD   [employer] VITAMIN A PO Take 2,400 mcg by mouth Daily    Historical Provider, MD   polyethylene glycol (MIRALAX) PACK packet Take 17 g by mouth 2 times daily    Historical Provider, MD   ammonium lactate (LAC-HYDRIN) 12 % lotion Apply topically as needed for Dry Skin Apply topically as needed. Historical Provider, MD   mineral oil-hydrophilic petrolatum (HYDROPHOR) ointment Apply topically as needed for Dry Skin Apply topically as needed. Historical Provider, MD   vitamin D 1000 UNITS CAPS Take by mouth daily     Historical Provider, MD   pravastatin (PRAVACHOL) 40 MG tablet Take 20 mg by mouth daily Take one half tablet at bedtime. Historical Provider, MD   enalapril (VASOTEC) 2.5 MG tablet Take 2.5 mg by mouth daily. Historical Provider, MD   primidone (MYSOLINE) 50 MG tablet Take 50 mg by mouth 2 times daily. Historical Provider, MD   donepezil (ARICEPT) 10 MG tablet Take 10 mg by mouth nightly.     Historical Provider, MD       Current medications:    Current Facility-Administered Medications   Medication Dose Route Frequency Provider Last Rate Last Admin    [START ON 10/15/2021] Vitamin D (CHOLECALCIFEROL) tablet 2,000 Units  2,000 Units Oral Daily Eri Wills MD        potassium chloride 10 Tonye Jointer, APRN - CNP        Or    potassium chloride 10 mEq/100 mL IVPB (Peripheral Line)  10 mEq IntraVENous PRN Tonye Jointer, APRN - CNP        acetaminophen (TYLENOL) tablet 650 mg  650 mg Oral Q4H PRN Tonye Jointer, APRN - CNP        oxyCODONE (ROXICODONE) immediate release tablet 2.5 mg  2.5 mg Oral Q6H PRN Tonye Jointer, APRN - CNP   2.5 mg at 10/13/21 2250    magnesium hydroxide (MILK OF MAGNESIA) 400 MG/5ML suspension 30 mL  30 mL Oral Daily PRN Tonye Jointer, APRN - CNP           Allergies: Allergies   Allergen Reactions    Ativan [Lorazepam] Other (See Comments)     Per family- makes pt more agitated and anxious    Clindamycin/Lincomycin     Pcn [Penicillins]        Problem List:    Patient Active Problem List   Diagnosis Code    Sepsis ([facility] [address] A41.9    Pneumonia, community acquired J18.9    CAD (coronary artery disease) I25.10    Hyperlipidemia E78.5    Dysphagia R13.10    Subdural hematoma ([address] S06.5X9A    H/O: CVA (cerebrovascular accident) Z80.78    Essential hypertension I10    Tremors of nervous system R25.1    Dementia ([facility] [address] F03.90    Thrombocytopenia ([address] D69.6    Anemia D64.9    Urinary retention R33.9    Drop attack R55    Protein-calorie malnutrition, moderate ([address] E44.0    Delirium secondary to multiple medical problems F05    Anemia associated with acute blood loss D62    Peptic ulcer disease with hemorrhage K27.4    Influenza B J10.1    Sepsis secondary to influenza B ([address] A41.9    GI bleed K92.2    Multiple skin tears T14. 8XXA    Closed left hip fracture, initial encounter ([facility] [address] S72.002A       Past Medical History:        Diagnosis Date    CAD (coronary artery disease)     Cerebral artery occlusion with cerebral infarction ([address]     Hemorrhage     right eye    Raynaud disease     Tremors of nervous system        Past Surgical History:        Procedure Laterality Date    CORONARY ANGIOPLASTY WITH STENT PLACEMENT      UPPER GASTROINTESTINAL ENDOSCOPY N/A 2019    EGD BIOPSY performed by Suhail Snow MD [address] History:    Social History     Tobacco Use    Smoking status: Former Smoker     Types: Cigarettes     Quit date: 1976     Years since quittin.8    Smokeless tobacco: Never Used   Substance Use Topics    Alcohol use: Not Currently     Comment:                                  Counseling given: Not Answered      Vital Signs (Current):   Vitals:    10/13/21 2240 10/14/21 0145 10/14/21 0751 10/14/21 0832   BP:  (!) 140/82 134/82 127/63   Pulse:  64 67 79   Resp:  16 16 16   Temp:   36.2 °C (97.1 °F) 36.9 °C (98.5 °F)   TempSrc:    Temporal   SpO2:  94% 94% 93%   Weight: 145 lb 1 oz (65.8 kg)      Height: 5' 9\" (1.753 m)                                                 BP Readings from Last 3 Encounters:   10/14/21 127/63   10/13/21 (!) 165/63   21 (!) 119/59       NPO Status:                                                                                 BMI:   Wt Readings from Last 3 Encounters:   10/13/21 145 lb 1 oz (65.8 kg)   10/13/21 145 lb (65.8 kg)   21 135 lb (61.2 kg)     Body mass index is 21.42 kg/m². CBC:   Lab Results   Component Value Date    WBC 7.2 10/14/2021    RBC 2.46 10/14/2021    HGB 7.8 10/14/2021    HCT 23.5 10/14/2021    MCV 95.5 10/14/2021    RDW 14.5 10/14/2021    PLT 88 10/14/2021       CMP:   Lab Results   Component Value Date     10/14/2021    K 3.0 10/14/2021     10/14/2021    CO2 17 10/14/2021    BUN 19 10/14/2021    CREATININE 0.8 10/14/2021    GFRAA >60 10/14/2021    LABGLOM >60 10/14/2021    GLUCOSE 79 10/14/2021    PROT 7.9 10/13/2021    CALCIUM 6.1 10/14/2021    BILITOT 0.4 10/13/2021    ALKPHOS 126 10/13/2021    AST 27 10/13/2021    ALT 13 10/13/2021       POC Tests: No results for input(s): POCGLU, POCNA, POCK, POCCL, POCBUN, POCHEMO, POCHCT in the last 72 hours.     Coags:   Lab Results   Component Value Date    PROTIME 12.1 10/13/2021    INR 1.1 10/13/2021    APTT 28.2 10/13/2021       HCG (If Applicable): No results found for: PREGTESTUR, PREGSERUM, HCG, HCGQUANT     ABGs: No results found for: PHART, PO2ART, NBQ2MQO, VBS4SSL, BEART, S7GEQVZB     Type & Screen (If Applicable):  No results found for: LABABO, [address]    Anesthesia Evaluation  Patient summary reviewed and Nursing notes reviewed no history of anesthetic complications:   Airway: Mallampati: III  TM distance: >3 FB   Neck ROM: full  Mouth opening: > = 3 FB Dental:      Comment: Poor dentition with multiple missing teeth    Pulmonary: breath sounds clear to auscultation  (+) pneumonia: resolved,                            ROS comment: Former smoker 2lpm Nasal canula   Cardiovascular:  Exercise tolerance: good (>4 METS),   (+) hypertension: moderate, CAD: obstructive, CABG/stent: no interval change,       ECG reviewed  Rhythm: regular  Rate: normal  Echocardiogram reviewed                  Neuro/Psych:   (+) CVA: residual symptoms, psychiatric history: stable with treatmentdepression/anxiety              ROS comment: Left eye blindness  Essential tremors  Dementia GI/Hepatic/Renal:   (+) PUD,          ROS comment: Ortiz in place with hematuria in urine. Endo/Other:    (+) blood dyscrasia: anemia and thrombocytopenia:., .                 Abdominal:         (-) obese       Vascular:   + PVD, aortic or cerebral, . Other Findings:               Anesthesia Plan      general     ASA 3     (PIV 22g L wrist)  Induction: intravenous. MIPS: Postoperative opioids intended, Prophylactic antiemetics administered and Postoperative trial extubation. Anesthetic plan and risks discussed with patient, child/children and healthcare power of . Use of blood products discussed with patient whom consented to blood products. Plan discussed with CRNA and attending.                   Sharon Wei RN   10/14/2021

## 2021-10-14 NOTE — PROGRESS NOTES
Ortiz catheter irrigated, no clots noted.  Electronically signed by Lucía Schreiber RN on 10/14/2021 at 5:51 PM

## 2021-10-14 NOTE — CONSULTS
10/14/2021 4:26 PM  Service: Urology  Group: [facility] ([facility])    Edith Phillips  [id number]     Chief Complaint:    Gross hematuria     History of Present Illness: The patient is a 80 y.o. male patient who presented to the hospital 1 day ago after suffering from a fall and subsequent left femoral neck fracture. He had left hip hemiarthroplasty with orthopedic surgery today. He is seen and examined in PACU. Confused and unable to provide medical history at this time. We were asked to evaluate him for gross hematuria. He had a maxwell inserted in the ER for unknown reasons and unknown output. Per nursing, gross hematuria has been noted since arrival to the floor. Past Medical History:   Diagnosis Date    CAD (coronary artery disease)     Cerebral artery occlusion with cerebral infarction ([facility] [address]     Hemorrhage     right eye    Raynaud disease     Tremors of nervous system          Past Surgical History:   Procedure Laterality Date    CORONARY ANGIOPLASTY WITH STENT PLACEMENT      UPPER GASTROINTESTINAL ENDOSCOPY N/A 11/13/2019    EGD BIOPSY performed by Alfreda Stuart MD at [place] [address]       Medications Prior to Admission:    Medications Prior to Admission: pantoprazole (PROTONIX) 40 MG tablet, Take 1 tablet by mouth daily (with breakfast)  sertraline (ZOLOFT) 25 MG tablet, Take 25 mg by mouth daily  [employer] VITAMIN A PO, Take 2,400 mcg by mouth Daily  polyethylene glycol (MIRALAX) PACK packet, Take 17 g by mouth 2 times daily  ammonium lactate (LAC-HYDRIN) 12 % lotion, Apply topically as needed for Dry Skin Apply topically as needed. mineral oil-hydrophilic petrolatum (HYDROPHOR) ointment, Apply topically as needed for Dry Skin Apply topically as needed. vitamin D 1000 UNITS CAPS, Take by mouth daily   [DISCONTINUED] aspirin 81 MG chewable tablet, Take 81 mg by mouth daily  pravastatin (PRAVACHOL) 40 MG tablet, Take 20 mg by mouth daily Take one half tablet at bedtime. enalapril (VASOTEC) 2.5 MG tablet, Take 2.5 mg by mouth daily. primidone (MYSOLINE) 50 MG tablet, Take 50 mg by mouth 2 times daily. donepezil (ARICEPT) 10 MG tablet, Take 10 mg by mouth nightly. Allergies:    Ativan [lorazepam], Clindamycin/lincomycin, and Pcn [penicillins]    Social History:    reports that he quit smoking about 45 years ago. His smoking use included cigarettes. He has never used smokeless tobacco. He reports previous alcohol use. He reports that he does not use drugs. Family History:   Non-contributory to this Urological problem  family history is not on file. Review of Systems:  Unable to obtain due to confusion     Physical Exam:     Vitals:  /65   Pulse 66   Temp 98.2 °F (36.8 °C)   Resp 16   Ht 5' 9\" (1.753 m)   Wt 145 lb 1 oz (65.8 kg)   SpO2 100%   BMI 21.42 kg/m²     General:  Sleeping, examined in pacu, responds to tactile stimuli, confused   HEENT:  Normocephalic, atraumatic. Lungs:  Respirations symmetric and non-labored. Abdomen:  soft, nontender, no masses  Extremities:  Left extremity dressing intact   Skin:  Warm and dry, no open lesions or rashes  Neuro: There are no motor or sensory deficits in the 4 quadrant extremities   Rectal: deferred  Genitourinary:   Maxwell catheter draining cherry colored urine     Labs:     Recent Labs     10/13/21  1348 10/14/21  0652   WBC 10.3 7.2   RBC 2.77* 2.46*   HGB 8.5* 7.8*   HCT 26.8* 23.5*   MCV 96.8 95.5   MCH 30.7 31.7   MCHC 31.7* 33.2   RDW 14.4 14.5   * 88*   MPV 11.0 11.2         Recent Labs     10/13/21  1348 10/14/21  0652   CREATININE 0.9 0.8       Lab Results   Component Value Date    PSA 2.12 02/03/2018       Assessment/plan:  Gross Hematuria     Hand irrigated maxwell catheter for small amount of clot retrieval. Urine returned to a light pink in color     Cont the maxwell catheter, this was inserted for unknown immediate output  Cont the manual irrigations every 2 hours and PRN   If  Hematuria does not color consistent with ethnicity/race

## 2021-10-14 NOTE — OP NOTE
Operative Note      Patient: Yamilet Viera  YOB: 1925  MRN: [id number]    Date of Procedure: 10/14/2021    Pre-Op Diagnosis: Left displaced femoral neck fracture    Post-Op Diagnosis: Same           Procedure:  Left hip hemiarthroplasty for displaced femoral neck fracture        Surgeon(s):  Geena Villarreal MD    Assistant:   Resident: Bertha Salmeron DO; Jewel Newby DO; Concetta Bowman DO    Anesthesia: General    Estimated Blood Loss (mL): less than 047     Complications: None    Specimens:   * No specimens in log *    Implants:  * No implants in log *      Drains: * No LDAs found *    Findings: Stable implant with no identifiable fractures. [place] HF X size #4 stem with a -3 neck bipolar head. Detailed Description of Procedure:   Brief Hospital Course:  Patient was admitted through the ER c/o Left hip pain. Xrays revealed a Left femoral neck fx. After examination of the patient, review of the radiologic studies, and appropriate pre-operative risk assessment, I recommended Left hip arthroplasty, which the patient was agreeable towards. Operative Course: Outside the operating suite, the patient was seen and identified. The operative site was marked and identified as appropriate by the patient, staff, surgeon, and Anesthesia. The patient was taken into the operating room, placed on the operative table, and placed under the appropriate amount of sedation under the care of the anesthesia team. The patient was placed into a lateral decubitus position. All bony prominences and neurovascular structures were well padded and protected. The operative site was then prepped and draped in a standard sterile fashion. An incision was made over the lateral trochanteric region and carried down to the level of the fascia kevin maintaining appropriate hemostasis with electrocautery. A small rent was placed in the fascia kevin.  Yan scissors were used to extend the fascia kevin incision in line with its fibers to the length of the skin incision. A Charnley retractor was then placed in the anterior and posterior margin of the tensor fascia kevin incision, taking care not to violate any neurovascular structures. The anterior one-third of the gluteus medius tendon was identified. In line with its fibers, it was reflected using electrocautery off its trochanteric insertion. The gluteus medius and minimus tendons were reflected anteriorly down to the level of the hip capsule. The hip capsule was T'd up to the acetabulum and around the posterior medial and inferior aspects of the hip. The fracture was visualized. An oscillating saw was then used to make the femoral neck cut at a 45-degree angle, 1 cm proximal to the lesser trochanter. After this was done, a corkscrew was then placed into the femoral head. The femoral head was levered out of the acetabulum, and taken for sizing. At this point, the acetabulum was then inspected, removing all bony fragments and interposed tissue. A Woody retractor was placed under the femoral neck cut. A box osteotome was introduced into the femoral neck which was removed. Then a T-handle canal finder was then introduced and removed. Broaching began at a size 0 and was carried up sequentially to an appropriately sized broach, where we found appropriate stability, axially and rotationally. The broach was then removed. The corresponding sized femoral component was then impacted in the appropriate position, which was found to have appropriate stable fit. An appropriately sized femoral head trial was then placed. The hip was reduced, taken through a range of motion, and was found to be appropriately stable. The hip was then dislocated. The femoral head trial component was then removed. The bipolar component was impacted onto the femoral stem into the appropriate position. The hip was then reduced, taken through a range of motion, and was found to be appropriately stable.  Copious irrigation was performed of the joint. Platelet-rich plasma gel was then injected into the joint. Also, #5 Ethibond was used to reapproximate the gluteus medius tendon to its trochanteric insertion. Copious irrigation was performed once more. The tensor fascia kevin was then closed with an #0 Vicryl. Copious irrigation was performed once more. #0 and 2-0 Vicryl were used to close the subcutaneous layer. Staples were used for the skin. A sterile layered dressing was placed over the wound. The patient was awakened from anesthesia, transferred to a hospital bed, and taken to the PACU in stable condition. Disposition: The patient was taken to PACU in stable condition. Once stable, he will be transferred to the floor. Orders have been provided to begin physical therapy, weight bear as tolerated Left lower extremity. Patient received a dose of Ancef preoperatively. We will continue this for 24 hours postoperatively for infection prophylaxis. The patient will also be started on  Lovenox while in the hospital and transition to aspirin orally as an outpatient for DVT prophylaxis. ,    Electronically signed by Pérez Zelaya MD on 10/14/2021 at 12:27 PM

## 2021-10-14 NOTE — ED PROVIDER NOTES
Department of Emergency Medicine   ED  Provider Note  Admit Date/RoomTime: 10/13/2021  1:15 PM  ED Room: [facility]          History of Present Illness:  10/13/21, Time: 8:55 PM EDT  Chief Complaint   Patient presents with    Fall     transfer from [facility] left hip frx. Tia Sutton is a 80 y.o. male presenting to the ED for fall. Patient mechanical fall. Unwitnessed. Did hit his head, there is no loss of conscious, is on no anticoagulation. Does claim of left hip pain, aching in nature, worsening moves, improves with rest.  Does not rating her. Was evaluated in an urgent care and sent in for fracture. Patient denies nausea, vomiting, change in bowel or bladder, neck pain or stiffness, lethargy, peer nausea, or any other symptoms or complaints. Review of Systems:   Pertinent positives and negatives are stated within HPI, all other systems reviewed and are negative.        --------------------------------------------- PAST HISTORY ---------------------------------------------  Past Medical History:  has a past medical history of CAD (coronary artery disease), Cerebral artery occlusion with cerebral infarction ([facility] [address] Hemorrhage, Raynaud disease, and Tremors of nervous system. Past Surgical History:  has a past surgical history that includes Coronary angioplasty with stent and Upper gastrointestinal endoscopy (N/A, 11/13/2019). Social History:  reports that he quit smoking about 45 years ago. His smoking use included cigarettes. He has never used smokeless tobacco. He reports previous alcohol use. He reports that he does not use drugs. Family History: family history is not on file. . Unless otherwise noted, family history is non contributory    The patients home medications have been reviewed.     Allergies: Ativan [lorazepam], Clindamycin/lincomycin, and Pcn [penicillins]        ---------------------------------------------------PHYSICAL EXAM--------------------------------------    Constitutional/General: Alert and oriented x 1  Head: Normocephalic and atraumatic  Eyes: PERRL, EOMI, sclera non icteric  Mouth: Oropharynx clear, handling secretions, no trismus, no asymmetry of the posterior oropharynx or uvular edema  Neck: Supple, full ROM, no stridor, no meningeal signs  Respiratory: Lungs clear to auscultation bilaterally, no wheezes, rales, or rhonchi. Not in respiratory distress  Cardiovascular:  Regular rate. Regular rhythm. 2+ distal pulses. Equal extremity pulses. Chest: No chest wall tenderness  GI:  Abdomen Soft, Non tender, Non distended. No rebound, guarding, or rigidity. No pulsatile masses. Musculoskeletal: Moves all extremities x 3. LLQ painful ROM  Integument: skin warm and dry. No rashes. Neurologic: GCS 15, no focal deficits, symmetric strength 5/5 in the upper and lower extremities bilaterally  Psychiatric: Normal Affect          -------------------------------------------------- RESULTS -------------------------------------------------  I have personally reviewed all laboratory and imaging results for this patient. Results are listed below.      LABS: (Lab results interpreted by me)  Results for orders placed or performed during the hospital encounter of 10/13/21   COVID-19, Rapid    Specimen: Nasopharyngeal Swab   Result Value Ref Range    SARS-CoV-2, NAAT Not Detected Not Detected   Comprehensive Metabolic Panel   Result Value Ref Range    Sodium 133 132 - 146 mmol/L    Potassium 4.3 3.5 - 5.0 mmol/L    Chloride 100 98 - 107 mmol/L    CO2 21 (L) 22 - 29 mmol/L    Anion Gap 12 7 - 16 mmol/L    Glucose 117 (H) 74 - 99 mg/dL    BUN 28 (H) 6 - 23 mg/dL    CREATININE 0.9 0.7 - 1.2 mg/dL    GFR Non-African American >60 >=60 mL/min/1.73    GFR African American >60     Calcium 8.6 8.6 - 10.2 mg/dL    Total Protein 7.9 6.4 - 8.3 g/dL    Albumin 4.1 3.5 - 5.2 g/dL    Total Bilirubin 0.4 0.0 - 1.2 mg/dL    Alkaline Phosphatase 126 40 - 129 U/L    ALT 13 0 - 40 U/L    AST 27 0 - 39 U/L   CBC Auto Differential   Result Value Ref Range    WBC 10.3 4.5 - 11.5 E9/L    RBC 2.77 (L) 3.80 - 5.80 E12/L    Hemoglobin 8.5 (L) 12.5 - 16.5 g/dL    Hematocrit 26.8 (L) 37.0 - 54.0 %    MCV 96.8 80.0 - 99.9 fL    MCH 30.7 26.0 - 35.0 pg    MCHC 31.7 (L) 32.0 - 34.5 %    RDW 14.4 11.5 - 15.0 fL    Platelets 297 (L) 510 - 450 E9/L    MPV 11.0 7.0 - 12.0 fL    Neutrophils % 88.3 (H) 43.0 - 80.0 %    Immature Granulocytes % 1.4 0.0 - 5.0 %    Lymphocytes % 6.2 (L) 20.0 - 42.0 %    Monocytes % 3.7 2.0 - 12.0 %    Eosinophils % 0.3 0.0 - 6.0 %    Basophils % 0.1 0.0 - 2.0 %    Neutrophils Absolute 9.08 (H) 1.80 - 7.30 E9/L    Immature Granulocytes # 0.14 E9/L    Lymphocytes Absolute 0.64 (L) 1.50 - 4.00 E9/L    Monocytes Absolute 0.38 0.10 - 0.95 E9/L    Eosinophils Absolute 0.03 (L) 0.05 - 0.50 E9/L    Basophils Absolute 0.01 0.00 - 0.20 E9/L   Protime-INR   Result Value Ref Range    Protime 12.1 9.3 - 12.4 sec    INR 1.1    APTT   Result Value Ref Range    aPTT 28.6 24.5 - 35.1 sec   Blood Gas, Arterial   Result Value Ref Range    Date Analyzed [id number]     Time Analyzed 1430     Source: Blood Arterial     pH, Blood Gas 7.434 7.350 - 7.450    PCO2 35.5 35.0 - 45.0 mmHg    PO2 43.4 (L) 75.0 - 100.0 mmHg    HCO3 23.2 22.0 - 26.0 mmol/L    B.E. -0.7 -3.0 - 3.0 mmol/L    O2 Sat 80.8 (L) 92.0 - 98.5 %    O2Hb 80.0 (L) 94.0 - 97.0 %    COHb 0.3 0.0 - 1.5 %    MetHb 0.7 0.0 - 1.5 %    O2 Content 10.9 mL/dL    HHb 19.0 (H) 0.0 - 5.0 %    tHb (est) 9.7 (L) 11.5 - 16.5 g/dL    Mode RA     Date Of Collection      Time Collected      Pt Temp 37.0 C     ID 1926     Lab [id number]     Critical(s) Notified .  No Critical Values    Brain Natriuretic Peptide   Result Value Ref Range    Pro- 0 - 450 pg/mL   Troponin   Result Value Ref Range    Troponin, High Sensitivity 15 (H) 0 - 11 ng/L   ,       RADIOLOGY:  Interpreted by Radiologist unless otherwise specified  CT HEAD WO CONTRAST   Final Result   No acute intracranial abnormality. Age-related loss of brain volume and   chronic white matter ischemic changes. CT CERVICAL SPINE WO CONTRAST   Final Result   1. When compared to Noncontrast CT of the Cervical Spine 08/13/2021, all   previously-demonstrated/described, chronic, multilevel, asymmetric vertebral   compression deformities, related vertebral column curvature/alignment   abnormalities, degenerative disease, central spinal canal stenoses, neural   foraminal narrowing, and background diffuse osteopenia, all appear not   significantly changed. 2.  Otherwise, no significant change. .         CTA PULMONARY W CONTRAST   Final Result   Note that there was a delay in receiving images. No evidence of pulmonary embolism. No evidence of acute aortic injury. Extensive emphysema. Extensive atherosclerotic disease. XR CHEST PORTABLE   Final Result   1. Radiographically-negative for acute process, with no significant change. .               EKG Interpretation  Interpreted by emergency department physician, Dr. Dominique Perry         ------------------------- NURSING NOTES AND VITALS REVIEWED ---------------------------   The nursing notes within the ED encounter and vital signs as below have been reviewed by myself  /88   Pulse 61   Temp 98.3 °F (36.8 °C) (Oral)   Resp 17   SpO2 91%     Oxygen Saturation Interpretation: Normal    The patients available past medical records and past encounters were reviewed.         ------------------------------ ED COURSE/MEDICAL DECISION MAKING----------------------  Medications   0.9 % sodium chloride infusion ( IntraVENous New Bag 10/13/21 6165)   sodium chloride flush 0.9 % injection 10 mL (10 mLs IntraVENous Not Given 10/13/21 1917)   iopamidol (ISOVUE-370) 76 % injection 75 mL (75 mLs IntraVENous Given 10/13/21 1802)   ziprasidone (GEODON) injection 10 mg (10 mg IntraMUSCular Given 10/13/21 1915) sterile water injection (  Given 10/13/21 1918)           The cardiac monitor revealed sinus with a heart rate in the 80s as interpreted by me. The cardiac monitor was ordered secondary to the patient's hip fracture and to monitor the patient for dysrhythmia. CPT 86485         Medical Decision Making:    Labs and imaging reviewed, patient will be admitted. Counseling: The emergency provider has spoken with the patient and discussed todays results, in addition to providing specific details for the plan of care and counseling regarding the diagnosis and prognosis. Questions are answered at this time and they are agreeable with the plan.       --------------------------------- IMPRESSION AND DISPOSITION ---------------------------------    IMPRESSION  1. Closed left hip fracture, initial encounter ([facility]    2. Closed fracture of hip, unspecified laterality, initial encounter ([facility]        DISPOSITION  Disposition: Admit to med/surg floor  Patient condition is stable        NOTE: This report was transcribed using voice recognition software.  Every effort was made to ensure accuracy; however, inadvertent computerized transcription errors may be present       Miriam Jones MD  10/15/21 [id number]

## 2021-10-15 PROBLEM — N17.9 AKI (ACUTE KIDNEY INJURY) (HCC): Status: ACTIVE | Noted: 2021-01-01

## 2021-10-15 NOTE — CARE COORDINATION
Patient from home, live with daughter and has 24 hour care through family and hired aide services. He also has 10 hours of aide services a week through the [facility]. He is typically ambulatory with a cane but often forgets to use an assistive device when he gets up to walk. Daughter, Nicolas Rose at bedside identifies herself as POA, we do not have those documents on file. She is asking for [facility] when released. Referral pending. She will review [facility] list for next choice, was asking about Hina Reyes also but wants to check with her brother first.     Rajinder accepted and will initiate precert once OT available. For questions I can be reached at [phone]. Zackary Said, [place]      The Plan for Transition of Care is related to the following treatment goals: discharge planning when stable     The Patient and/or patient representative Sona Bella was provided with a choice of provider and agrees   with the discharge plan. [x] Yes [] No    Freedom of choice list was provided with basic dialogue that supports the patient's individualized plan of care/goals, treatment preferences and shares the quality data associated with the providers.  [x] Yes [] No

## 2021-10-15 NOTE — PROGRESS NOTES
[address]      GRTM:10/05/2463                                                  Patient Name: Ju Medina  MRN: [id number]  : 1925  Room: [address] [place] [address]    Evaluating OT: BEL Sethi, OTR/L  # [id number]    Referring Provider:  Mary Borja DO  Specific Provider Orders:  Pearl Ramono and Treat\"  10-13-21    Diagnosis: Closed left hip fracture, initial encounter ([facility] [address] Miriam Zambrano    Pt was admitted after falling at home. Sustained fracture L LE    Pertinent Medical History:  Pt has a past medical history of CAD (coronary artery disease), Cerebral artery occlusion with cerebral infarction ([facility] [address] Hemorrhage, Raynaud disease, and Tremors of nervous system. ,  has a past surgical history that includes Coronary angioplasty with stent; Upper gastrointestinal endoscopy (N/A, 2019); and hip surgery (Left, 10/14/2021).     Surgeries this admission: 10-14-21:  Left hip hemiarthroplasty for displaced femoral neck fracture       Precautions:  Fall Risk  WBAT L LE  90* Hip Precautions  Dysphagia - Soft/Bite-Sized, Thin Liquids    Assessment of current deficits   [x] Functional mobility   [x]ADLs  [x] Strength               [x]Cognition   [x] Functional transfers   [x] IADLs         [x] Safety Awareness   [x]Endurance   [x] Fine Coordination              [x] Balance      [] Vision/perception   []Sensation    [x]Gross Motor Coordination  [] ROM  [] Delirium                   [x] Motor Control       OT PLAN OF CARE   OT POC based on physician orders, patient diagnosis and results of clinical assessment    Frequency/Duration 1-3 days/wk for 2 weeks PRN   Specific OT Treatment to include:   * Instruction/training on adapted ADL techniques and AE recommendations to increase functional independence within precautions       * Training on energy conservation strategies, correct breathing pattern and techniques to improve independence/tolerance for self-care routine  * Functional transfer/mobility training/DME recommendations for increased independence, safety, and fall prevention  * Patient/Family education to increase follow through with safety techniques and functional independence  * Recommendation of environmental modifications for increased safety with functional transfers/mobility and ADLs  * Cognitive retraining/development of therapeutic activities to improve problem solving, judgement, memory, and attention for increased safety/participation in ADL/IADL tasks  * Therapeutic exercise to improve motor endurance, ROM, and functional strength for ADLs/functional transfers  * Therapeutic activities to facilitate/challenge dynamic balance, stand tolerance for increased safety and independence with ADLs  * Therapeutic activities to facilitate gross/fine motor skills for increased independence with ADLs  * Neuro-muscular re-education: facilitation of righting/equilibrium reactions, midline orientation, scapular stability/mobility, normalization of muscle tone, and facilitation of volitional active controled movement  * Positioning to improve skin integrity, interaction with environment and functional independence  * Delirium prevention/treatment  Other:    Recommended Adaptive Equipment: TBD as pt progresses       Home Living:  Pt lives in his home with his Dtr - 2-story house, Bed/bath on the 2nd floor.  (+) Basement. Bathroom setup:  Tub-Shower w/ Grab bar and Shower chair, High Commode w/ Grab bar  Equipment owned:  [facility], [employer]     Available Family Assist:  Dtr works ~ 10-12 hours/day - Pt has Aides for ~ 12 hours when Dtr is at Work. 24/7 SUP    Prior Level of Function:  Per Dtr, pt was IND with Dressing/Toileting, Transfers and Mobility using No AD for household ambulation. SUP for safety w/ bathing. Pt reported pt \"usually forgets to use his Cane. \"   Driving:  No  Occupation:  Not Reported;  [place]    Pain Level:  Unable to quantify. No indication of pain at rest, FACES Scale 5/10 pain w/ Bed mobility, transfers;  Relief w/ Rest and Repositioning, Nsg Notified   Additional Complaints:  None indicated - Denied dizziness or lightheadedness    Cognition: A & O x 2. Pt was able to state his full name and , ID current place as \"hospital\" w/ Min VCs, unable to ID current Month/Year w/ Mod VCs given 2 choices   Able to Follow Multi-Step Commands w/ Mod-Max VCs   Memory:  fair (-)   Sequencing:  fair (-)   Problem solving:  fair (-)   Judgement/safety:  fair (-)  Additional Comments:  Pt was sleeping in supine at start of session, required increased time and upright position to increase level of alertness.   At end of session seated in his b/s chair, Pt was more alert and attentive pleasant and cooperative, however, somewhat impulsive, attempting to transfer from chair w/o Assist.  Dtr/RN present and aware of need for Close SUP for safety    Vitals/Lab Values:  WFL w/ 4L O2         Functional Assessment:  AM-PAC Daily Activity Raw Score:      Initial Eval Status  Date: 10/15/21   Treatment Status  Date: STGs = LTGs  Time frame: 10-14 days   Feeding Min A/Set up    Intention Tremor, varied in severity during task, initially required mod A to grasp container w/ L Hand and Mod A to grasp and manipulate Utensil w/ R hand, able to bring utensil to mouth, Improved to requiring Min A for steadying of tremors R UE only -able to feed self - uncertain if pt has Weighted utensils at home    SUP/Set up   Grooming Min A/Set up    Min A for quality of task to wash hands/face/comb hair in semi-supine and seated in chair, Min VCs   Unsafe to attempt to ambulate to or stand at sink    SUP/set up  Seated/Standing at the Sink   UB Dressing Mod A/Set up    Mod A, Mod VCs to don robe seated in chair  Unsafe to attempt item retrieval for activities    SUP/Set up     LB Dressing Dep    Max A of 1  to don socks, thread LEs into pants, pull pants over hips + Mod A of 1 for safety w/ dynamic standing balance (pants simulated) w/ use of 88 Drybar  Pt ed for safe/adaptive techs    Mod A     Bathing NT      Mod A      Toileting NT    Ortiz Catheter  Requested BSC for use in room    Mod A     Bed Mobility  Supine to sit: Max A of 2  Sit to supine:  NT     Mod VCs for safety, adherence to hip precautions, initially required Max A for upright position at EOB, gradually improved to Close SUP seated EOB w/ VCs and Tactile cues, placement of R UE to steady self    Supine to sit: Min A  Sit to supine: Min A     Functional Transfers Mod A    EOB 1x, Chair 2x  Pt ed for safety/hand placement    Min A     Functional Mobility Mod A w/ WW    B/t surfaces, short distance at b/s - seated rest break b/t trials  Pt ed for safety/improved safety awareness, walker safety    Min A     Balance Sitting:     Static:  Max A -> Close SUP unsupported at EOB, SUP for safety in armed chair    Dynamic:  Close SUP w/ functional ax EOB, chair     Standing:     Static:  Mod A w/ 88 Drybar    Dynamic:  Mod A w/ 88 Drybar for functional ax/mobility     Sitting:     Static:  Remote SUP    Dynamic:  SUP w/ functional ax      Standing:     Static:  SUP w/ 88 TransferGo Curtis    Dynamic:  Min A w/ functional ax/mobility w/ 88 Drybar   Activity Tolerance Fair    Limited by general weakness, fatigue, pain, tremors, cognition    Fair(+)   Visual/  Perceptual    Hearing: WNL   Glasses: None at b/s    [facility]  Hearing Aids:  Per Dtr, has hearing aids but does not wear them               Hand Dominance: Right   AROM Strength Additional Info:    RUE  WFL 4/5 proximally  4+/5 distally Good(-) ;    Fair FMC/dexterity noted during ADL tasks  Mild<>Moderate<>Moderately Severe Intention Tremors Naeem UEs     LUE WFL 4/5 proximally  4+/5 distally Good(-) ;    Fair(-) FMC/dexterity noted during ADL tasks       Sensation:  Denies numbness or tingling Naeem UEs   Tone: WFL Naeem UEs   Edema: None Noted Naeem UEs Comments: Upon arrival, patient was found sleeping soundly in semi-supine. He was agreeable to participate in therapeutic ax. His Dtr was present during most of session. Received permission from RN prior to engaging pt in OT services. Completed Transfers w/ PT - pt required 2 Skilled Therapists for safety     At the end of the session, patient was properly positioned in b/s chair. Call light and phone within reach, all lines and tubes intact. Oriented pt to call bell. Made all appropriate Environmental Modifications to facilitate pt's level of IND and safety. All needs met. Dtr remained at b/s. RN made aware of pt's position in chair. Dtr agreeable not to leave pt unattended without notifying Staff for pt's safety         Overall patient demonstrated decreased independence and safety during completion of ADL/functional transfer/mobility tasks. Pt would benefit from continued skilled OT to increase safety and independence with completion of ADL/IADL tasks for functional independence and quality of life.     Treatment: OT treatment provided this date includes:    Instruction/training on safety and adapted techniques for completion of ADLs, use of DME/AD/Adaptive equip: within limits of precautions    Instruction/training on safe functional mobility/transfer techniques, use of DME/AD: within limits of precautions    Instruction/training on energy conservation techs (EC)/Pursed-Lip Breathing (PLB)/work simplification for completion of ADLs:      Neuromuscular Reeducation to facilitate balance/righting reactions for increased function with ADLs:     Skilled positioning/alignment for Pain Mgmt, Skin Integrity, to maximize Pt's safety and ability to [facility] interact w/ his/her environment, maximize respiratory status   Activity tolerance - Sitting/Standing to improve endurance w/ functional ax    Cognitive retraining -  Oriented pt to current Date, Place and Situation; Cues for safety/safety awareness, sequencing, problem solving    Skilled monitoring of pt's response to tx ax       Neuromuscular Facilitation of  UE functional movement/ROM/fine motor dexterity      Consulted RN, Family, PT     Made all appropriate Environmental Modifications to facilitate pt's level of IND and safety.  Recommendations for Continued Participation in OT services during Hospitalization and at D/C - SNF    Pt and/or Family verbalized/demonstrated a Limited understanding of education provided. Will Review PRN. Rehab Potential: Fair(+) for established goals     Patient / Family Goal: Not stated at this time      Patient and/or family were instructed on functional diagnosis, prognosis/goals and OT plan of care. Demonstrated Limited understanding. Eval Complexity: Low    Time In: 1006  Time Out: 1041  Total Treatment Time: 20 minutes    Min Units   OT Eval Low 97165  X  1   OT Eval Medium 05396      OT Eval High 68212      OT Re-Eval [id number]       Therapeutic Ex 73850       Therapeutic Activities 04087       ADL/Self Care 04493  20  1   Orthotic Management 21546       Manual 32470     Neuro Re-Ed 75850       Non-Billable Time              Evaluation Time additionally includes thorough review of current medical information, gathering information on past medical history/social history and prior level of function, completion of standardized testing/informal observation of tasks, assessment of data and education on plan of care and goals.             Flory Mclain, BEL, OTR/L  # [id number]

## 2021-10-15 NOTE — PROGRESS NOTES
Ortiz catheter irrigated not clots noted.  Electronically signed by Kaylie Palomares RN on 10/15/2021 at 9:09 AM

## 2021-10-15 NOTE — ANESTHESIA POSTPROCEDURE EVALUATION
Department of Anesthesiology  Postprocedure Note    Patient: Paulo Hope  MRN: [id number]  YOB: 1925  Date of evaluation: 10/15/2021  Time:  5:31 AM     Procedure Summary     Date: 10/14/21 Room / Location: [facility] OR 08 / [facility]    Anesthesia Start: 3146 Anesthesia Stop: 1259    Procedure: HIP HEMIARTHROPLASTY (Left Hip) Diagnosis: (FRACTURE)    Surgeons: Hazel Salmeron MD Responsible Provider: Karen Hutchinson DO    Anesthesia Type: general ASA Status: 3          Anesthesia Type: general    Ramos Phase I: Ramos Score: 9    Ramos Phase II:      Last vitals: Reviewed and per EMR flowsheets.        Anesthesia Post Evaluation    Patient location during evaluation: PACU  Patient participation: complete - patient participated  Level of consciousness: awake and alert  Airway patency: patent  Nausea & Vomiting: no nausea and no vomiting  Complications: no  Cardiovascular status: blood pressure returned to baseline  Respiratory status: acceptable  Hydration status: euvolemic

## 2021-10-15 NOTE — PROGRESS NOTES
10/15/2021 10:30 AM  Service: Urology  Group: [facility] urology (Venkat/Verna/Elmer)    Yelitza Dickens  [id number]    Subjective:  Pt awake  Confused  Attempts to pull at catheter at times  Urine light red  Ortiz irrigated manually by myself  No clots obtained, clears easily    Review of Systems  Unable to obtain      Scheduled Meds:   vitamin D  2,000 Units Oral Daily    tranexamic acid (CYKLOKAPRON) irrigation  1,000 mg Irrigation Once    sodium chloride flush  5-40 mL IntraVENous 2 times per day    sennosides-docusate sodium  1 tablet Oral BID    acetaminophen  650 mg Oral Q6H    sodium chloride flush  10 mL IntraVENous Once    donepezil  10 mg Oral Nightly    [Held by provider] enalapril  2.5 mg Oral Daily    pantoprazole  40 mg Oral Daily with breakfast    pravastatin  20 mg Oral Daily    primidone  50 mg Oral BID    sertraline  25 mg Oral Daily    sodium chloride flush  5-40 mL IntraVENous 2 times per day    enoxaparin  40 mg SubCUTAneous Daily       Objective:  Vitals:    10/15/21 0740   BP: 137/62   Pulse: 77   Resp: 16   Temp: 98.5 °F (36.9 °C)   SpO2: 98%         Allergies: Ativan [lorazepam], Clindamycin/lincomycin, and Pcn [penicillins]    General Appearance: alert and oriented to person, place and time and in no acute distress  Skin: no rash or erythema  Head: normocephalic and atraumatic  Pulmonary/Chest: normal air movement, no respiratory distress and no chest wall tenderness  Abdomen: soft, non-tender, non-distended  Genitalia: No penile or scrotal swelling or masses. Ortiz well placed and draining light red urine, no clots      Labs:     Recent Labs     10/15/21  0428      K 4.8   *   CO2 19*   BUN 33*   CREATININE 1.3*   GLUCOSE 94   CALCIUM 7.7*       Lab Results   Component Value Date    HGB 7.1 10/15/2021    HCT 21.8 10/15/2021     Results for Twyla Treadwell (MRN [id number]) as of 10/15/2021 10:34   Ref.  Range 8/13/2021 01:43   Color, UA Latest Ref Range: Straw/Yellow Yellow   Clarity, UA Latest Ref Range: Clear  Clear   Glucose, UA Latest Ref Range: Negative mg/dL Negative   Bilirubin, Urine Latest Ref Range: Negative  Negative   Ketones, Urine Latest Ref Range: Negative mg/dL Negative   Specific Gravity, UA Latest Ref Range: 1.005 - 1.030  1.020   Blood, Urine Latest Ref Range: Negative  Negative   pH, UA Latest Ref Range: 5.0 - 9.0  6.0   Protein, UA Latest Ref Range: Negative mg/dL TRACE   Urobilinogen, Urine Latest Ref Range: <2.0 E.U./dL 0.2   Nitrite, Urine Latest Ref Range: Negative  Negative   Leukocyte Esterase, Urine Latest Ref Range: Negative  Negative   WBC, UA Latest Ref Range: 0 - 5 /HPF 2-5   RBC, UA Latest Ref Range: 0 - 2 /HPF NONE   Epithelial Cells, UA Latest Units: /HPF FEW   Bacteria, UA Latest Ref Range: None Seen /HPF FEW (A)       Assessment:    Gross hematuria    Plan:  Continue maxwell  Continue manual irrigation q 2 hours and prn  Consider 22 fr 3-way maxwell and CBI if clot retention  Monitor HGB  Transfusions per primary  Urine cytology pending  Urine was negative for blood prior to maxwell insertion  Hematuria likely related to traumatic maxwell insertion or pt pulling on maxwell  Please keep maxwell secured to leg with catheter fixation device  Please take measures to keep pt from pulling at catheter  Discontinue catheter as soon as clinically able  PT is working with this pt now  Will defer to primary      STEPHON Ponce - CNP   [facility]  Urology

## 2021-10-15 NOTE — PROGRESS NOTES
Chief Complaint:  Chief Complaint   Patient presents with    Fall     transfer from [facility] left hip frx. Closed left hip fracture, initial encounter ([address]     Subjective:    Doing OK post op. His family notes a mild junky cough which is intermittent. No other complaints. Objective:    BP (!) 111/49   Pulse 73   Temp 98.3 °F (36.8 °C) (Oral)   Resp 16   Ht 5' 9\" (1.753 m)   Wt 145 lb 1 oz (65.8 kg)   SpO2 93%   BMI 21.42 kg/m²     Current medications that patient is taking have been reviewed. General appearance: NAD, conversant, frail/elderly appearing  HEENT: AT/NC, MMM  Neck: FROM, supple  Lungs: Clear to auscultation, WOB normal  CV: RRR, no MRGs  Abdomen: Soft, non-tender; no masses or HSM, +BS  Extremities: No peripheral edema or digital cyanosis.   L hip dressing c/d/i  Skin: no rash, lesions or ulcers  Psych: Calm and cooperative  Neuro: Alert and interactive, face symmetric, moving all extremities, speech fluent    Labs:  CBC with Differential:    Lab Results   Component Value Date    WBC 8.5 10/15/2021    RBC 2.30 10/15/2021    HGB 7.1 10/15/2021    HCT 21.8 10/15/2021    PLT 73 10/15/2021    MCV 94.8 10/15/2021    MCH 30.9 10/15/2021    MCHC 32.6 10/15/2021    RDW 15.9 10/15/2021    LYMPHOPCT 10.4 10/14/2021    MONOPCT 6.1 10/14/2021    BASOPCT 0.1 10/14/2021    MONOSABS 0.43 10/14/2021    LYMPHSABS 0.72 10/14/2021    EOSABS 0.06 10/14/2021    BASOSABS 0.00 10/14/2021     CMP:    Lab Results   Component Value Date     10/15/2021    K 4.8 10/15/2021     10/15/2021    CO2 19 10/15/2021    BUN 33 10/15/2021    CREATININE 1.3 10/15/2021    GFRAA >60 10/15/2021    LABGLOM 51 10/15/2021    GLUCOSE 94 10/15/2021    PROT 7.9 10/13/2021    LABALBU 4.1 10/13/2021    CALCIUM 7.7 10/15/2021    BILITOT 0.4 10/13/2021    ALKPHOS 126 10/13/2021    AST 27 10/13/2021    ALT 13 10/13/2021        Imaging:  I've personally reviewed the patient's CXR  Similar to prior  Per rads,   Questionable increased opacity in the right base which could represent a   developing infiltrate otherwise chest is stable to previous. Assessment/Plan:  Principal Problem:    Closed left hip fracture, initial encounter ([address]  Active Problems:    CAD (coronary artery disease)    H/O: CVA (cerebrovascular accident)    Essential hypertension    Dementia (HCC)    Thrombocytopenia (HCC)    Anemia    Postoperative anemia due to acute blood loss    MARIETTA (acute kidney injury) ([address]  Resolved Problems:    * No resolved hospital problems. *       Post op MARIETTA    Start IV fluids    Continue to hold ARB    The infiltrate isn't convincing. Monitor symptoms, WBC, respiratory status - low threshold to start antibiotics    BP well controlled    Mild post op anemia as well, not meeting transfusion criteria.     Reduce pain med doses, they are a bit high for patient of his age    Requires continued inpatient level of care     Roma Hinkle MD    4:38 PM  10/15/2021

## 2021-10-15 NOTE — PROGRESS NOTES
denies numbness and tingling to extremities  Edema:  unremarkable    Patient education  Pt educated on role of therapy, safety with bed mobility/transfers, use of FWW    Patient response to education:   Pt verbalized understanding Pt demonstrated skill Pt requires further education in this area   yes yes yes     ASSESSMENT:    Conditions Requiring Skilled Therapeutic Intervention:    [x]Decreased strength     []Decreased ROM  [x]Decreased functional mobility  [x]Decreased balance   [x]Decreased endurance   [x]Decreased posture  [x]Decreased sensation  [x]Decreased coordination   [x]Decreased vision  [x]Decreased safety awareness   []Increased pain       Comments:  Pt resting semi-supine upon arrival, agreeable to PT eval. Pt required manual assist for B LE and trunk negotiation due to poor ability to initiate transition with verbal/tactile cues. Pt initially requiring heavy assist for upright trunk posture at EOB but quickly progressed to CGA. Pt required ongoing tactile cues to avoid standing from EOB prematurely. Pt required lift/lower assist for sit<>stadn transfers, manual assist for balance and walker stabilization during bed<>chair transfer. Pt amb with antalgic gait leading with L LE and decreased R LE progression with R LE lagging behind during forward amb. Pt seated in chair upon completion of session with all needs in reach and daughter at bedside. He lacks insight to current level of deficits and will require frequent reinforcement of all education topics. Pt will benefit from continued skilled PT services to improve strength, balance, and safety awareness for independence with functional mobility.     Treatment:  Patient practiced and was instructed in the following treatment:     Bed mobility: cues for sequencing, manual assist for B LE and trunk negotiation   Transfer training: cues for hand placement, manual assist for lift/lower, manual assist for balance during bed>chair transfer   Gait training: cues for upright posture and walker placement, manual assist for balance throughout    Pt's/ family goals   1. To return home with family    Prognosis is good for reaching above PT goals. Patient and or family understand(s) diagnosis, prognosis, and plan of care. yes    PHYSICAL THERAPY PLAN OF CARE:    PT POC is established based on physician order and patient diagnosis     Referring provider/PT Order:    10/13/21 2245  PT evaluation and treat Start: 10/13/21 2245, End: 10/13/21 2245, ONE TIME, Standing Count: 1 Occurrences, R     Last continued at transfer on Thu Oct 14, 2021  4:22 PM    Dax Porter DO     10/14/21 1630  PT eval and treat Start: 10/14/21 1630, End: 10/14/21 1630, ONE TIME, Standing Count: 1 Occurrences, R      Dax Porter DO     Diagnosis:  Closed left hip fracture, initial encounter ([facility] [address] [S72.002A]  Specific instructions for next treatment:  Progress amb with FWW    Current Treatment Recommendations:     [x] Strengthening to improve independence with functional mobility   [x] ROM to improve independence with functional mobility   [x] Balance Training to improve static/dynamic balance and to reduce fall risk  [x] Endurance Training to improve activity tolerance during functional mobility   [x] Transfer Training to improve safety and independence with all functional transfers   [x] Gait Training to improve gait mechanics, endurance and asses need for appropriate assistive device  [x] Stair Training in preparation for safe discharge home and/or into the community   [x] Positioning to prevent skin breakdown and contractures  [x] Safety and Education Training   [x] Patient/Caregiver Education   [x] HEP  [] Other     PT long term treatment goals are located in above grid    Frequency of treatments: 2-5x/week x 1-2 weeks.     Time in  1016  Time out  1041    Total Treatment Time  10 minutes     Evaluation Time includes thorough review of current medical information, gathering information on past medical history/social history and prior level of function, completion of standardized testing/informal observation of tasks, assessment of data and education on plan of care and goals.     CPT codes:  [] Low Complexity PT evaluation 86762  [x] Moderate Complexity PT evaluation 35051  [] High Complexity PT evaluation 20273  [] PT Re-evaluation 76889  [] Gait training 53099 0 minutes  [] Manual therapy 53091 0 minutes  [x] Therapeutic activities 12998 10 minutes  [] Therapeutic exercises 88360 0 minutes  [] Neuromuscular reeducation 67041 0 minutes     Tyesha Goddard, PT, DPT  [id number]

## 2021-10-15 NOTE — PROGRESS NOTES
Department of Orthopedic Surgery  Resident Progress Note    Patient seen and examined. Pain controlled. No complaints. Denies chest pain, shortness of breath, dizziness/lightheadedness. Confused this AM.    VITALS:  BP (!) 124/55   Pulse 79   Temp 98.2 °F (36.8 °C) (Axillary)   Resp 16   Ht 5' 9\" (1.753 m)   Wt 145 lb 1 oz (65.8 kg)   SpO2 93%   BMI 21.42 kg/m²     General: Confused, patient with electronic sitter at bedside. Does not follow commands appropriately. MUSCULOSKELETAL:   left lower extremity:  · New dressing applied. Removed Aquacel yesterday. · Compartments soft and compressible  · +2/4 DP & PT pulses, Brisk Cap refill, Toes warm and perfused  · Motor and distal sensation cannot be appropriately assessed this AM.    CBC:   Lab Results   Component Value Date    WBC 8.5 10/15/2021    HGB 7.1 10/15/2021    HCT 21.8 10/15/2021    PLT 73 10/15/2021     PT/INR:    Lab Results   Component Value Date    PROTIME 12.1 10/13/2021    INR 1.1 10/13/2021       ASSESSMENT  · S/P L Hip David arthroplasty - 10/14/21    PLAN      · Continue physical therapy and protocol: WBAT - LLE  · 24 hour abx coverage  · Deep venous thrombosis prophylaxis - Eliquis, early mobilization  · Doing well POD #1  · PT/OT  · Pain Control: IV and PO  · Monitor H&H-7.1, treat for less than 7 or symptomatic. Vitals stable. · D/C Plan:  Plan for DC when medically stable.

## 2021-10-15 NOTE — PROGRESS NOTES
Upon entering the room patient had removed nasal cannula, IV, and surgical dressing to left hip. Dry dressing applied to left hip.

## 2021-10-16 NOTE — PROGRESS NOTES
.  Pt will complete BOTR strength/ ROM exercises to reduce pharyngeal residuals and improve epiglottic inversion moderate verbal prompts  Pt will complete laryngeal strength/ ROM therapeutic exercises to improve airway protection for the least restrictive PO diet moderate verbal prompts    Long Term Goals:   Pt will improve oropharyngeal swallow function to ensure airway protection during PO intake to maintain adequate nutrition/hydration and decrease signs/symptoms of aspiration to less than 1 x/day. Patient/family Goal:    Did not state. Will further assess during treatment.     Plan of care discussed with Patient   The Patient did not demonstrate complete understanding of the diagnosis, prognosis and plan of care     Rehabilitation Potential/Prognosis: fair-good                      ADMITTING DIAGNOSIS: Closed left hip fracture, initial encounter ([facility] [S72.002A]     VISIT DIAGNOSIS:   Visit Diagnoses       Codes    Closed fracture of hip, unspecified laterality, initial encounter ([facility]     S72.009A              PATIENT REPORT/COMPLAINT: did not state    PRIOR LEVEL OF SWALLOW FUNCTION:    Past History of Dysphagia?:  yes    Diet during hospital admission: NPO     PROCEDURE:  Consistencies Administered During the Evaluation   Liquids: nectar thick liquid   Solids:  pureed foods      Method of Intake:   spoon  Fed by clinician      Position:   Seated, upright, Lateral plane    INSTRUMENTAL ASSESSMENT:    ORAL PREP/ ORAL PHASE:    Delayed A-P transit due to: reduced lingual strength  and cognitive function      PHARYNGEAL PHASE:     ONSET TIME       Delayed initiation of the pharyngeal swallow was noted with swallow reflex triggered at the level of the tongue base         PHARYNGEAL RESIDUALS        Vallecula/Pharyngeal Wall           Reduced tongue base retraction resulting in residuals in vallecula and/or posterior pharyngeal wall for pureed foods which partially cleared  with cued double swallow Pyriform Sinuses      No significant residuals were noted in the pyriform sinuses     LARYNGEAL PENETRATION   Laryngeal penetration occurred prior to aspiration. Further details under aspiration section. Laryngeal penetration occurred in the absence of aspiration DURING the swallow for pureed foods due to  inadequate laryngeal closure which remained in the laryngeal vestibule. . Laryngeal penetration was mild-moderate and occurred inconsistently   an absent cough/throat clear was noted    ASPIRATION  Aspiration occurred AFTER the swallow for nectar consistency liquid due to pharyngeal residuals . Aspiration was moderate-severe and occurred consistently . a delayed cough was noted    PENETRATION-ASPIRATION SCALE (PAS):  THIN item not administered  MILDLY THICK 7 = Material enters the airway, passes below the vocal folds, and is not ejected from the trachea despite effort   MODERATELY THICK item not administered  PUREE 3 = Material enters the airway, remains above the vocal folds, and is not ejected from the airway  HARD SOLID item not administered       COMPENSATORY STRATEGIES    Compensatory strategies that were not beneficial included Double swallow      STRUCTURAL/FUNCTIONAL ANOMALIES   No structural/functional anomalies were noted    CERVICAL ESOPHAGEAL STAGE :     The cervical esophagus appeared adequate          ___________    Cognition:   Confusion noted and Hard of hearing    Oral Peripheral Examination   Generalized oral weakness    Current Respiratory Status   4 liters nasal cannula     Parameters of Speech Production  Respiration:  Adequate for speech production  Quality:   Within functional limits  Intensity: Within functional limits    Pain: No pain reported. EDUCATION:   The Speech Language Pathologist (SLP) completed education regarding results of evaluation and that intervention is warranted at this time.   Learner: Patient  Education: Reviewed results and recommendations of this evaluation  Evaluation of Education:  Needs further instruction    This plan may be re-evaluated and revised as warranted. Evaluation Time includes thorough review of current medical information, gathering information on past medical history/social history and prior level of function, completion of standardized testing/informal observation of tasks, assessment of data and education on plan of care and goals. [x]The admitting diagnosis and active problem list, have been reviewed prior to initiation of this evaluation.     CPT Code: 40500  dysphagia study    ACTIVE PROBLEM LIST:   Patient Active Problem List   Diagnosis    Sepsis ([facility]    Pneumonia, community acquired    CAD (coronary artery disease)    Hyperlipidemia    Dysphagia    Subdural hematoma ([address]    H/O: CVA (cerebrovascular accident)    Essential hypertension    Tremors of nervous system    Dementia ([address]    Thrombocytopenia ([address]    Anemia    Urinary retention    Drop attack    Protein-calorie malnutrition, moderate ([address]    Delirium secondary to multiple medical problems    Postoperative anemia due to acute blood loss    Peptic ulcer disease with hemorrhage    Influenza B    Sepsis secondary to influenza B ([address]    GI bleed    Multiple skin tears    Closed left hip fracture, initial encounter ([facility]    MARIETTA (acute kidney injury) ([facility]

## 2021-10-16 NOTE — PROGRESS NOTES
Department of Orthopedic Surgery  Resident Progress Note    Patient seen and examined. Pain controlled. No complaints. Denies chest pain, shortness of breath, dizziness/lightheadedness. Confused this AM but still able to converse and follow commands. VITALS:  BP (!) 133/97   Pulse 100   Temp 100.4 °F (38 °C) (Axillary)   Resp 16   Ht 5' 9\" (1.753 m)   Wt 145 lb 1 oz (65.8 kg)   SpO2 93%   BMI 21.42 kg/m²     General: Slightly confused but able to follow commands and converse. MUSCULOSKELETAL:   left lower extremity:  · Dressing clean dry and intact  · Compartments soft and compressible  · +2/4 DP & PT pulses, Brisk Cap refill, Toes warm and perfused  · Patient will spontaneously move digits and ankles  · Sensation intact with tactile stimuli about dermatomes of the foot    CBC:   Lab Results   Component Value Date    WBC 6.9 10/16/2021    HGB 7.5 10/16/2021    HCT 23.0 10/16/2021    PLT 94 10/16/2021     PT/INR:    Lab Results   Component Value Date    PROTIME 12.1 10/13/2021    INR 1.1 10/13/2021       ASSESSMENT  · S/P L Hip David arthroplasty - 10/14/21    PLAN      · Continue physical therapy and protocol: WBAT - LLE  · 24 hour abx coverage completed  · Deep venous thrombosis prophylaxis - Eliquis, early mobilization  · Doing well   · PT/OT  · Pain Control: IV and PO wean to orals  · Monitor H&H, treat for less than 7 or symptomatic. Vitals stable. · Orthopedics to follow peripherally at this point time please contact with any questions or concerns.   Okay for DC from orthopedic standpoint once medically ready

## 2021-10-16 NOTE — PROGRESS NOTES
Date: 10/16/2021       Patient Name: Michale Trejo  : 1925      MRN: [id number]    Patient currently unavailable for physical therapy services as patient undergoing procedure at bedside.    Will follow up as able    Radha Gaytan PT, DPT  [id number]

## 2021-10-16 NOTE — PROGRESS NOTES
Subjective: The patient is awake and alert. Resting in bed without any complaints  Undergoing Ortiz catheter exchange on my evaluation secondary to leaking of urine  Somewhat confused/agitated    Objective:    BP (!) 133/97   Pulse 100   Temp 100.4 °F (38 °C) (Axillary)   Resp 16   Ht 5' 9\" (1.753 m)   Wt 145 lb 1 oz (65.8 kg)   SpO2 93%   BMI 21.42 kg/m²     In: 844 [I.V.:844]  Out: 1200   In: 844   Out: 1200 [Urine:1200]    General appearance: NAD, conversant, frail/chronically ill  HEENT: AT/NC, MMM  Neck: FROM, supple  Lungs: Clear to auscultation  CV: RRR, no MRGs  Vasc: Radial pulses 2+  Abdomen: Soft, non-tender; no masses or HSM  Extremities: No peripheral edema or digital cyanosis, Left hip dressing, diffuse ecchymoses noted  Skin: no rash, lesions or ulcers  Psych: Alert and oriented to person, place and time  Neuro: Alert and interactive     Recent Labs     10/14/21  0652 10/14/21  0652 10/14/21  1756 10/15/21  0428 10/16/21  0446   WBC 7.2  --   --  8.5 6.9   HGB 7.8*   < > 8.2* 7.1* 7.5*   HCT 23.5*   < > 24.8* 21.8* 23.0*   PLT 88*  --   --  73* 94*    < > = values in this interval not displayed. Recent Labs     10/14/21  0652 10/15/21  0428 10/16/21  0446    138 137   K 3.0* 4.8 4.4   * 109* 107   CO2 17* 19* 20*   BUN 19 33* 28*   CREATININE 0.8 1.3* 1.2   CALCIUM 6.1* 7.7* 8.0*       Assessment:    Principal Problem:    Closed left hip fracture, initial encounter ([facility])  Active Problems:    CAD (coronary artery disease)    H/O: CVA (cerebrovascular accident)    Essential hypertension    Dementia (HCC)    Thrombocytopenia (HCC)    Anemia    Postoperative anemia due to acute blood loss    MARIETTA (acute kidney injury) ([facility]  Resolved Problems:    * No resolved hospital problems.  *      Plan:  Closed left hip fracture  -Pain control  -POD#2 SP left hemiarthroplasty  -Monitor H&H and transfuse as necessary to keep hgb greater than 7    Post Op MARIETTA  -Ortiz  -Accurat I&O's  -Monitor labs - today BUN/Creat 28/1.2 improved. -IV hydration    Likely developing pneumonia from aspiration-discussed with family  Initiate empiric IV antibiotic therapy given low-grade fevers-Zosyn twice daily  Patient failed swallow eval, likely been aspirating  Explained to the family that patient likely is aspirating given recent procedure requiring anesthesia, altered mentation, sundowning-would keep him n.p.o. for another 24 hours and wait for his mentation to clear at that point decide whether to resume p.o. feeds    Hematuria  -Urology following for gross hematuria due to trauma with maxwell insertion  -H&H on the low side 7.5/23-hemodynamically stable-on chemical DVT prophylaxis with Lovenox 40 daily post Ortho surgery-monitor closely      DVT Prophylaxis   PT/OT  Discharge [address] Chambers - CNP  8:45 AM  10/16/2021     Above note edited to reflect my thoughts     I personally saw, examined and provided care for the patient. Radiographs, labs and medication list were reviewed by me independently. The case was discussed in detail and plans for care were established. Review of [address] [place] [address], documentation was conducted and revisions were made as appropriate directly by me. I agree with the above documented exam, problem list, and plan of care.      Funmilayo Evans MD  1:23 PM  10/16/2021

## 2021-10-17 NOTE — PROGRESS NOTES
twice daily  Patient failed swallow eval, likely been aspirating  Explained to the family that patient likely is aspirating given recent procedure requiring anesthesia, altered mentation, sundowning-would keep him n.p.o. for another 24 hours and wait for his mentation to clear at that point decide whether to resume p.o. feeds     Hematuria  -Urology following for gross hematuria due to trauma with maxwell insertion  -H&H on the low side 6.6 today will transfuse 1 unit PRBC - on chemical DVT prophylaxis with Lovenox 40 daily post Ortho surgery-monitor closely      DVT Prophylaxis   PT/OT  Discharge [address], APRN - CNP  8:21 AM  10/17/2021     Above note edited to reflect my thoughts     I personally saw, examined and provided care for the patient. Radiographs, labs and medication list were reviewed by me independently. The case was discussed in detail and plans for care were established. Review of [address] [place] [address], documentation was conducted and revisions were made as appropriate directly by me. I agree with the above documented exam, problem list, and plan of care.      Bob Sykes MD  10/17/2021

## 2021-10-17 NOTE — PROGRESS NOTES
[facility]  ([facility])      PROGRESS NOTE         PATIENT NAME: Edith Phillips   YOB: 1925  ADMISSION DATE: 10/13/2021  1:15 PM   TODAY'S DATE: 10/17/2021        Subjective       Called back for hematuria yesterday. Urine this morning is yellow in tubing. Did have some clots overnight. Evaluated with nurse and family at bedside. Discussed plan  Pt comfortable  Ortiz exchanged for coude yesterday      Objective     VS:   BP (!) 138/57   Pulse 81   Temp 98.4 °F (36.9 °C) (Axillary)   Resp 18   Ht 5' 9\" (1.753 m)   Wt 145 lb 1 oz (65.8 kg)   SpO2 96%   BMI 21.42 kg/m²     I & O - 24hr:    Intake/Output Summary (Last 24 hours) at 10/17/2021 1109  Last data filed at 10/17/2021 7516  Gross per 24 hour   Intake 1116 ml   Output 1100 ml   Net 16 ml         Physical Exam:  General:  Neck:  Resp:  Abdomen:   No acute distress  Supple  Normal effort  Soft, non-tender, nondistended   :  Ortiz yellow   Skin: Skin color, texture, turgor normal, no rashes or lesions       Labs and Imaging Studies   Labs:    CBC:   Recent Labs     10/15/21  0428 10/16/21  0446 10/17/21  0438   WBC 8.5 6.9 5.4   HGB 7.1* 7.5* 6.6*   HCT 21.8* 23.0* 20.7*   MCV 94.8 93.5 96.3   PLT 73* 94* 83*     BMP:  Recent Labs     10/15/21  0428 10/16/21  0446 10/17/21  0438    137 133   K 4.8 4.4 4.0   * 107 103   CO2 19* 20* 20*   BUN 33* 28* 23   CREATININE 1.3* 1.2 1.1       Magnesium:   Lab Results   Component Value Date    MG 1.6 10/14/2021      Phosphate:   Lab Results   Component Value Date    PHOS 2.9 08/13/2021     PT/INR: No results for input(s): PROTIME, INR in the last 72 hours.     U/A:   Lab Results   Component Value Date    LEUKOCYTESUR Negative 08/13/2021    PHUR 6.0 08/13/2021    WBCUA 2-5 08/13/2021    RBCUA NONE 08/13/2021    BACTERIA FEW 08/13/2021    SPECGRAV 1.020 08/13/2021    BLOODU Negative 08/13/2021    GLUCOSEU Negative 08/13/2021       Urine Culture:       Component Value Date/Time    LABURIN <10,000 CFU/mL  Mixed gram positive organisms   05/12/2018 1235        Blood Culture:     Imaging Studies:          Assessment and Plan       ASSESMENT:  81 y/o M with AUR and gross hematuria (resolved)    PLAN:    Will attempt trial of void once more ambulatory and having Bowel movements. At this time maxwell is draining yellow urine. Discussed plan with nursing. Irrigate as needed   Ensure no pulling of the maxwell. Void trial soon.    Will monitor      Emily Connelly MD  [facility]  10/17/2021  11:09 AM

## 2021-10-17 NOTE — PROGRESS NOTES
Physical Therapy  Treatment Note    Name: Paulo Hope  : 1925  MRN: [id number]      Date of Service: 10/17/2021    Evaluating PT:  Momo Medrano, PT, DPT  [id number]      Room #:  1197/4337-A  Diagnosis:  Closed left hip fracture, initial encounter [facility]) [S72.002A]  PMHx/PSHx:  CAD, R eye hemorrhage, tremors, raynaud disease, cerebral artery occlusion with infarct  Procedure/Surgery:  L hip hemiarthroplasty 10/14/21  Precautions:  Falls, L hip precautions, L hip WBAT, cognition, TSM, bed alarm, [place]  Equipment Needs:  TBD    SUBJECTIVE:    Pt lives with his daugther [place] style home with 3 stairs to enter and B rails. Pt has permanent first floor set up with bed and bathroom on first floor. Pt ambulated with no AD indoors and FWW outdoors at baseline. Pt has  supervision/assistance of family vs caregivers. DME owned: rollator, FWW, canes, w/c    OBJECTIVE:   Initial Evaluation  Date: 10/15/21 Treatment  Date: 10/17/2021 Short Term/ Long Term   Goals   AM-PAC 6 Clicks  53/33    Was pt agreeable to Eval/treatment? yes yes    Does pt have pain? No c/o pain No c/o pain    Bed Mobility  Rolling: NT  Supine to sit: MaxAx2  Sit to supine: NT  Scooting:  Mod A to EOB Rolling: maxA  Supine to sit: maxA  Sit to supine: NT  Scooting: maxA Rolling: Supervision   Supine to sit: Supervision   Sit to supine: Supervision   Scooting: Supervision    Transfers Sit to stand: ModA  Stand to sit: ModA  Stand pivot: ModA with FWW Sit<>stand: modA   Stand pivot: modA front [employer] Sit to stand: Supervision   Stand to sit: Supervision   Stand pivot: Supervision with FWW   Ambulation    5 feet forward/backward with FWW with ModA NT >50 feet with FWW with Supervision    Stair negotiation: ascended and descended  NT NT 3 steps with B rails Supervision    ROM BUE:  WNL  BLE:  WNL     Strength BUE:  WNL  BLE:  Mild functional weakness, unable to follow cues for accurate MMT BLE:  Mild functional weakness, unable to follow cues for accurate MMT    Balance Sitting EOB:  ModA>CGA  Dynamic Standing:  ModA with FWW Sitting EOB:  CGA  Dynamic standing: modA front [employer] Sitting EOB:  Supervision   Dynamic Standing:  Supervision with FWW     Pt is A & O x 1 self only  Sensation:  Pt denies numbness and tingling to extremities  Edema:  unremarkable    Vitals:  SPo2 monitored throughout session:  5L 97%  Weaned to 2L 96%  92% 2L post transfer  95% seated recovery 2L at end of session    Patient education  Pt educated on role of PT intervention. Pt educated on safety in room with utilization of call light for assistance with mobility. Pt educated on importance of maximizing OOB time by transferring to bedside chair for meals and ambulating to bathroom/transferring to bedside commode with assistance from nursing and therapy staff to increase functional activity tolerance and overall functional independence. Patient response to education:   Pt verbalized understanding Pt demonstrated skill Pt requires further education in this area   yes yes yes     ASSESSMENT:    Comments:  RN cleared pt for activity prior to session. Pt received supine in bed and agreeable to PT intervention at this time. Pt performed all functional mobility as noted above. Pt required increased time to complete tasks d/t slow movement speed and impaired cognition. Pt completed stand pivot transfer to bedside chair. Resting tremors impaired ability to negotiate [employer] so further ambulation was deferred at this time. Pt left seated in bedside chair at end of session and left with all needs met and call light in reach. Pt requires continued skilled PT intervention for the purposes of maximizing functional mobility and independence by addressing deficits described above. Treatment:  Patient practiced and was instructed in the following treatment:     Therapeutic Activities Completed:  o Functional mobility as noted above:   - Bed mobility: as noted above.   Max VC and hand over hand guidance to facilitate efficient use of BUE on bed rail to promote more independent completion of task. Increased time and effort to complete. Practiced scooting forwards and laterally at EOB with Kandi. Max VC for improved posture and use of BUE for support. - Transfer training: Sit<>stand:  modA from EOB and chair. max VC for proper hand placement and sequencing to promote safe and more independent completion of sit<>stand transition. Stand pivot with front [employer] modA. max VC for proper sequencing when turning to sit with WW to promote safe and independent execution of stand pivot transfer. Assistance required for balance and [employer] negotiation. o Skilled repositioning in seated position for comfort.   o Skilled vitals monitoring as noted above.  o Pt education as noted above. PLAN:    Patient is making fair progress towards established goals. Will continue with current POC.       Time in  0924  Time out  0947    Total Treatment Time  23 minutes     CPT codes:  [] Gait training 90564 0 minutes  [] Manual therapy 67326 0 minutes  [x] Therapeutic activities 27558 23 minutes  [] Therapeutic exercises 31863 0 minutes  [] Neuromuscular reeducation 02257 0 minutes    Allison Ponce, PT, DPT  [id number]

## 2021-10-18 NOTE — PROGRESS NOTES
Walked into patients room,family member removed restraints from patient at this time. Spoke with family member- will trial patient out of restraints at this time.

## 2021-10-18 NOTE — PROGRESS NOTES
Patients family still present at the bedside and requesting that restraints be kept off patient. Patient is doing ok without restraints on at this time. Will continue to monitor patient.

## 2021-10-18 NOTE — CARE COORDINATION
[facility] denied by insurance late Friday evening, they feel he can be managed at a lower level of care. Discussed with daughter at bedside this am, choiced for [facility] referral pending. Patient out of restraints since this morning. Failed swallow over the weekend, currently NPO. HENS pending and ambulance on soft chart. [facility] accepted, will start auth in am if he is able to stay out of restraints. For questions I can be reached at [phone]. Nitish Weir [place]      The Plan for Transition of Care is related to the following treatment goals: discharge planning when medically stable     The Patient and/or patient representative Dalton Sotelo was provided with a choice of provider and agrees   with the discharge plan. [x] Yes [] No    Freedom of choice list was provided with basic dialogue that supports the patient's individualized plan of care/goals, treatment preferences and shares the quality data associated with the providers.  [x] Yes [] No

## 2021-10-18 NOTE — PLAN OF CARE
Problem: Skin Integrity:  Goal: Will show no infection signs and symptoms  Description: Will show no infection signs and symptoms  Outcome: Met This Shift  Goal: Absence of new skin breakdown  Description: Absence of new skin breakdown  Outcome: Met This Shift     Problem: Falls - Risk of:  Goal: Will remain free from falls  Description: Will remain free from falls  Outcome: Met This Shift  Goal: Absence of physical injury  Description: Absence of physical injury  Outcome: Met This Shift     Problem: Non-Violent Restraints  Goal: No harm/injury to patient while restraints in use  Outcome: Met This Shift  Goal: Patient's dignity will be maintained  Outcome: Met This Shift

## 2021-10-18 NOTE — PROGRESS NOTES
Physician Progress Note      Pelon Quach  CSN #:                  [id number]  :                       1925  ADMIT DATE:       10/13/2021 1:15 PM  DISCH DATE:  RESPONDING  PROVIDER #:        Denise Camacho MD          QUERY TEXT:    Pt admitted with closed hip fracture following a fall and noted to have gross   hematuria in the urine. If possible, please document in the progress notes and   discharge summary if you are evaluating and/or treating any of the following: The medical record reflects the following:  Risk Factors: age hip fracture throbocytopenia left hip arthroplasty  Clinical Indicators: Hbg drop from 8.7 to 7.1 plts 72  gross hematuria  Treatment: transfuse PRBC consult urology irrigate maxwell  serial H&H's    Deepti Maxwell RN BSN CCDS  Options provided:  -- Acute blood loss anemia  -- Chronic blood loss anemia  -- Acute on chronic blood loss anemia  -- Iron deficiency anemia  -- Postoperative acute blood loss anemia  -- Anemia of chronic disease due to ***(CKD, neoplasm, specific disease?if   known)  -- Anemia due to antineoplastic chemotherapy  -- Dilutional anemia  -- Precipitous drop in Hemoglobin and Hematocrit  -- Other - I will add my own diagnosis  -- Disagree - Not applicable / Not valid  -- Disagree - Clinically unable to determine / Unknown  -- Refer to Clinical Documentation Reviewer    PROVIDER RESPONSE TEXT:    Provider is clinically unable to determine a response to this query.     Query created by: Yannick Chavez on 10/15/2021 6:50 AM      Electronically signed by:  Denise Camacho MD 10/18/2021 11:48 AM

## 2021-10-18 NOTE — PROGRESS NOTES
Occupational Therapy  OCCUPATIONAL THERAPY BEDSIDE TREATMENT NOTE   [address]     Date:10/18/2021  Patient Name: Ronna Glover  MRN: [id number]  : 1925  Room: [address] [place] [address]     Evaluating OT: BEL Rangel, OTR/L  # [id number]     Referring Provider:  Christopher Powell DO  Specific Provider Orders:  Madeleine Pablo and Treat\"  10-13-21     Diagnosis: Closed left hip fracture, initial encounter ([facility] [S72.002A]     Pt was admitted after falling at home. Sustained fracture L LE     Pertinent Medical History:  Pt has a past medical history of CAD (coronary artery disease), Cerebral artery occlusion with cerebral infarction ([facility] Hemorrhage, Raynaud disease, and Tremors of nervous system. ,  has a past surgical history that includes Coronary angioplasty with stent; Upper gastrointestinal endoscopy (N/A, 2019); and hip surgery (Left, 10/14/2021).    Surgeries this admission: 10-14-21:  Left hip hemiarthroplasty for displaced femoral neck fracture        Precautions:  Fall Risk  WBAT L LE  90* Hip Precautions  Dysphagia - Soft/Bite-Sized, Thin Liquids     Assessment of current deficits   [x]? Functional mobility                         [x]?ADLs           [x]? Strength                  [x]? Cognition   [x]? Functional transfers           [x]? IADLs         [x]? Safety Awareness   [x]? Endurance   [x]? Fine Coordination              [x]? Balance      []? Vision/perception   []? Sensation     [x]? Gross Motor Coordination  []? ROM           []? Delirium                   [x]?  Motor Control         OT PLAN OF CARE   OT POC based on physician orders, patient diagnosis and results of clinical assessment     Frequency/Duration 1-3 days/wk for 2 weeks PRN   Specific OT Treatment to include:   * Instruction/training on adapted ADL techniques and AE recommendations to increase functional independence within precautions       * Training on energy conservation strategies, correct breathing pattern and techniques to improve independence/tolerance for self-care routine  * Functional transfer/mobility training/DME recommendations for increased independence, safety, and fall prevention  * Patient/Family education to increase follow through with safety techniques and functional independence  * Recommendation of environmental modifications for increased safety with functional transfers/mobility and ADLs  * Cognitive retraining/development of therapeutic activities to improve problem solving, judgement, memory, and attention for increased safety/participation in ADL/IADL tasks  * Therapeutic exercise to improve motor endurance, ROM, and functional strength for ADLs/functional transfers  * Therapeutic activities to facilitate/challenge dynamic balance, stand tolerance for increased safety and independence with ADLs  * Therapeutic activities to facilitate gross/fine motor skills for increased independence with ADLs  * Neuro-muscular re-education: facilitation of righting/equilibrium reactions, midline orientation, scapular stability/mobility, normalization of muscle tone, and facilitation of volitional active controled movement  * Positioning to improve skin integrity, interaction with environment and functional independence  * Delirium prevention/treatment  Other:     Recommended Adaptive Equipment: TBD as pt progresses      Home Living:  Pt lives in his home with his Dtr - 2-story house, Bed/bath on the 2nd floor.  (+) Basement. Bathroom setup:  Tub-Shower w/ Grab bar and Shower chair, High Commode w/ Grab bar  Equipment owned:  [employer], [employer]      Available Family Assist:  Dtr works ~ 10-12 hours/day - Pt has Aides for ~ 12 hours when Dtr is at Work. 24/7 SUP     Prior Level of Function:  Per Dtr, pt was IND with Dressing/Toileting, Transfers and Mobility using No AD for household ambulation. SUP for safety w/ bathing.   Pt reported pt \"usually forgets to use his [place]olla. \"   Driving:  No  Occupation:  Not Reported;       Pain Level:  No c/o pain throughout session. Additional Complaints:  None indicated      Cognition: A & O x 1. Pt was able to state his name. Able to Follow Multi-Step Commands w/ Mod-Max VCs              Memory:  poor              Sequencing:  poor              Problem solving:  poor-              Judgement/safety:  poor-                   Functional Assessment:  AM-PAC Daily Activity Raw Score: 11/24       Initial Eval Status  Date: 10/15/21    Treatment Status  Date: 10/18/21 STGs = LTGs  Time frame: 10-14 days   Feeding Min A/Set up     Intention Tremor, varied in severity during task, initially required mod A to grasp container w/ L Hand and Mod A to grasp and manipulate Utensil w/ R hand, able to bring utensil to mouth, Improved to requiring Min A for steadying of tremors R UE only -able to feed self - uncertain if pt has Weighted utensils at home     N/T SUP/Set up   Grooming Min A/Set up     Min A for quality of task to wash hands/face/comb hair in semi-supine and seated in chair, Min VCs   Unsafe to attempt to ambulate to or stand at sink     Mod A; To wash face and comb hair while sitting up on the chair. SUP/set up  Seated/Standing at the [address] [place] [address] Avenue A/Set up     Mod A, Mod VCs to don robe seated in chair  Unsafe to attempt item retrieval for activities    Max A; To simulate kristal/doff of gown while sitting up on the EOB.     SUP/Set up      LB Dressing Dep     Max A of 1  to don socks, thread LEs into pants, pull pants over hips + Mod A of 1 for safety w/ dynamic standing balance (pants simulated) w/ use of [employer]  Pt ed for safe/adaptive techs    Dep; To complete kristal/doff of socks while in supine.     Mod A      Bathing NT        N/T;  Pt declined at this time. Mod A       Toileting NT     Ortiz Catheter  Requested BSC for use in room     Ortiz placed  Mod A      Bed Mobility  Supine to sit:  Max A of 2  Sit to supine:  NT      Mod VCs for safety, adherence to hip precautions, initially required Max A for upright position at EOB, gradually improved to Close SUP seated EOB w/ VCs and Tactile cues, placement of R UE to steady self     Sup <> sit: Max A of 2   Supine to sit: Min A  Sit to supine: Min A      Functional Transfers Mod A     EOB 1x, Chair 2x  Pt ed for safety/hand placement    Max A of 2;    From EOB and chair wih use of w/w and HHA.   Min A      Functional Mobility Mod A w/ [employer]     B/t surfaces, short distance at b/s - seated rest break b/t trials  Pt ed for safety/improved safety awareness, walker safety     SPT: to/from chair from EOB with verbal prompting and Max A of 2 to shuffle to/from chair. Min A      Balance Sitting:     Static:  Max A -> Close SUP unsupported at EOB, SUP for safety in armed chair    Dynamic:  Close SUP w/ functional ax EOB, chair      Standing:     Static:  Mod A w/ [employer]    Dynamic:  Mod A w/ [employer] for functional ax/mobility      Sitting: CGA      Standing: Max A x 2 Sitting:     Static:  Remote SUP    Dynamic:  SUP w/ functional ax       Standing:     Static:  SUP w/ [employer]    Dynamic:  Min A w/ functional ax/mobility w/ [employer]   Activity Tolerance Fair     Limited by general weakness, fatigue, pain, tremors, cognition    Poor  Fair(+)   Visual/  Perceptual     Hearing: WNL   Glasses: None at b/s     WFL - Slightly [place]  Hearing Aids:  Per Dtr, has hearing aids but does not wear them                       Hand Dominance: Right    AROM Strength Additional Info:    RUE  WFL 4/5 proximally  4+/5 distally Good(-) ;    Fair FMC/dexterity noted during ADL tasks  Mild<>Moderate<>Moderately Severe Intention Tremors Naeem UEs      LUE WFL 4/5 proximally  4+/5 distally Good(-) ;    Fair(-) FMC/dexterity noted during ADL tasks         Sensation:  Denies numbness or tingling Naeem UEs   Tone: WFL Naeem UEs   Edema: None Noted Naeem UEs      Comments: Upon arrival, patient was found sleeping soundly in semi-supine. He was agreeable to participate in therapeutic ax. No family present at the beginning of session and daughter present at the end of the session.  Received permission from RN prior to engaging pt in OT services. Completed Transfers w/ PT - pt required 2 skilled Therapists for safety      At the end of the session, patient was properly positioned in high sanderson position in bed. Call light and phone within reach, all lines and tubes intact. Oriented pt to call bell. Made all appropriate Environmental Modifications to facilitate pt's level of IND and safety. All needs met. Dtr at b/s. RN made aware of pt's position in chair.       Overall patient demonstrated decreased independence and safety during completion of ADL/functional transfer/mobility tasks.   Pt would benefit from continued skilled OT to increase safety and independence with completion of ADL/IADL tasks for functional independence and quality of life.     Time In: 10:59  Time Out: 11:23  Total Treatment Time: 24 minutes    Min Units   OT Eval Low 43513     OT Eval Medium 89963     OT Eval High 43927     OT Re-Eval [id number]     Therapeutic Ex 12267     Therapeutic Activities 37518 48 1   ADL/Self Care 81130 11 1   Orthotic Management 02577       Manual 28622       Neuro Re-Ed 19122       Non-Billable Time          Alphonse Cueto [address], [address]

## 2021-10-18 NOTE — PROGRESS NOTES
10/18/2021 2:14 PM  Service: Urology  Group: [facility] ([facility]erna[facility])    Leon Rose  [id number]    Subjective:    Resting comfortably  Remains confused  Activity apron in place  Daughter at bedside  His maxwell is draining yellow urine at this time     Review of Systems  Unable to obtain due to confusion       Scheduled Meds:   piperacillin-tazobactam  3,375 mg IntraVENous Q8H    sodium chloride  25 mL IntraVENous Q8H    vitamin D  2,000 Units Oral Daily    tranexamic acid (CYKLOKAPRON) irrigation  1,000 mg Irrigation Once    sodium chloride flush  5-40 mL IntraVENous 2 times per day    sennosides-docusate sodium  1 tablet Oral BID    acetaminophen  650 mg Oral Q6H    sodium chloride flush  10 mL IntraVENous Once    donepezil  10 mg Oral Nightly    [Held by provider] enalapril  2.5 mg Oral Daily    pantoprazole  40 mg Oral Daily with breakfast    pravastatin  20 mg Oral Daily    primidone  50 mg Oral BID    sertraline  25 mg Oral Daily    sodium chloride flush  5-40 mL IntraVENous 2 times per day    enoxaparin  40 mg SubCUTAneous Daily       Objective:  Vitals:    10/18/21 0754   BP: 126/64   Pulse: 79   Resp: 20   Temp: 97.8 °F (36.6 °C)   SpO2: 92%         Allergies: Ativan [lorazepam], Clindamycin/lincomycin, and Pcn [penicillins]    General Appearance: resting comfortably, confused, no acute distress   Skin: no rash or erythema  Head: normocephalic and atraumatic  Pulmonary/Chest: normal air movement, no respiratory distress  Abdomen: soft, non-tender, non-distended  Genitourinary:maxwell draining yellow urine   Extremities: no cyanosis, clubbing or edema         Labs:     Recent Labs     10/18/21  0444      K 3.6      CO2 18*   BUN 24*   CREATININE 1.1   GLUCOSE 97   CALCIUM 7.8*       Lab Results   Component Value Date    HGB 7.7 10/18/2021    HCT 24.6 10/18/2021       Lab Results   Component Value Date    PSA 2.12 02/03/2018         Assessment/Plan:  Gross Hematuria, resolved     Urine cytology pending   Manually irrigate PRN gross hematuria   Cont the maxwell, inserted for unknown immediate output  Maxwell should be left indwelling until the patient is more ambulatory.  This can be done in rehab if he is to go there upon discharge   Will ask nursing to notify us prior to his discharge to determine voiding trial  No further  interventions are planned at this time  Please call with any further questions or concerns  Thank you for allowing us to participate in his care       Mendel Ortega, [address] [place] [address]  Urology

## 2021-10-18 NOTE — PROGRESS NOTES
Patient son at bedside and very upset that patient is not able to eat/ drink. Explained to patients son that per the swallow study patient did not pass. Told patients son that I spoke with someone from the xray department and they are going to try to get patients swallow study done at some point tomorrow morning. Patients family is still requesting that restraints are off patient at this time. Patient is doing ok without restraints. Will continue to monitor patient.

## 2021-10-18 NOTE — PROGRESS NOTES
Physical Therapy  Facility/Department: [address] PICU  Daily Treatment Note  NAME: Lennox Blanton  : 1925  MRN: [id number]    Date of Service: 10/18/2021    Evaluating PT:  Roseann Thurman PT, DPT  [id number]        Room #:  8179/8371-O  Diagnosis:  Closed left hip fracture, initial encounter [facility]) [S72.002A]  PMHx/PSHx:  CAD, R eye hemorrhage, tremors, raynaud disease, cerebral artery occlusion with infarct  Procedure/Surgery:  L hip hemiarthroplasty 10/14/21  Precautions:  Falls, L hip precautions, L hip WBAT, cognition, TSM, bed alarm, [place]  Equipment Needs:  TBD     SUBJECTIVE:     Pt lives with his daugther in a [place] [facility] [place] home with 3 stairs to enter and B rails. Pt has permanent first floor set up with bed and bathroom on first floor. Pt ambulated with no AD indoors and FWW outdoors at baseline. Pt has  supervision/assistance of family vs caregivers. DME owned: rollator, FWW, canes, w/c     OBJECTIVE:    Initial Evaluation  Date: 10/15/21 Treatment  Date: 10/18/2021 Short Term/ Long Term   Goals   AM-PAC 6 Clicks      Was pt agreeable to Eval/treatment? yes yes     Does pt have pain? No c/o pain No c/o pain     Bed Mobility  Rolling: NT  Supine to sit: MaxAx2  Sit to supine: NT  Scooting:  Mod A to EOB Rolling: MaxA  Supine to sit: MaxA x2  Sit to supine: MaxA x2  Scooting: MaxA to EOB Rolling: Supervision   Supine to sit: Supervision   Sit to supine: Supervision   Scooting: Supervision    Transfers Sit to stand: ModA  Stand to sit: ModA  Stand pivot: ModA with FWW Sit to stand: MaxA x2  Stand to sit: MaxA   Stand pivot: ModA with FWW Sit to stand: Supervision   Stand to sit: Supervision   Stand pivot: Supervision with FWW   Ambulation    5 feet forward/backward with FWW with ModA NT >50 feet with FWW with Supervision    Stair negotiation: ascended and descended  NT NT 3 steps with B rails Supervision    ROM BUE:  WNL  BLE:  WNL       Strength BUE:  WNL  BLE:  Mild functional weakness, unable to follow cues for accurate MMT BLE:  Mild functional weakness, unable to follow cues for accurate MMT     Balance Sitting EOB:  ModA>CGA  Dynamic Standing:  ModA with FWW Sitting EOB:  CGA  Dynamic standing: MaxA x2 with FWW Sitting EOB:  Supervision   Dynamic Standing:  Supervision with FWW      Pt is A & O x 1 self only  Sensation:  Pt denies numbness and tingling to extremities  Edema:  unremarkable      Vitals:  SPO2 85% upon arrival to room as pt had removed NC, NC was replaced and SPO2 increased to 94% resting on 2L/min and maintained >93%    Therapeutic Exercises:    Transfer training    Patient education  Pt educated on role of therapy, safety with mobility, sequencing with bed mobility and transfers    Patient response to education:   Pt verbalized understanding Pt demonstrated skill Pt requires further education in this area   yes yes yes     ASSESSMENT:    Comments:  Pt resting supine upon arrival, agreeable to PT eval. Pt's dressing to L hip surgical site noted to be saturated with blood; RN notified and completed dressing change prior to upright activity. Pt required manual assist of B LEs and trunk for supine<>sit transitions. Pt required increased assist for lift/lower and stand-step transfer between bed<>chair from previous sessions. Pt maintained crouched stance and strong posterior lean with all standing. Pt seated in chair x5 minutes then returned to semi-supine as he is unsafe to remain OOB to chair this date due to cognition. Pt with all needs in reach and bed alarm applied; daughter at bedside attentive to pt needs.     Treatment:  Patient practiced and was instructed in the following treatment:     Bed mobility: cues for sequencing, manual assist for B LE and trunk negotiation    Transfer training: cues for hand placement, manual assist for lift/lower, manual assist for balance and walker progression for bed<>chair   Gait training: cues for upright posture, manual assist for balance and walker progression    PLAN:    Patient is making continued progress towards established goals. Will continue with current POC.         PLAN:    Time in  1059  Time out  1123    Total Treatment Time  24 min    CPT codes:  [] Gait training 79442 0 minutes  [] Manual therapy 47926 0 minutes  [x] Therapeutic activities 87472 24 minutes  [] Therapeutic exercises 07061 0 minutes  [] Neuromuscular reeducation 44206 0 minutes      Dayday Braun, PT, DPT  [id number]

## 2021-10-19 NOTE — PROGRESS NOTES
Paged Dr. Sonja Grande IV team here to place [address] is requesting numbing spray before insertion.

## 2021-10-19 NOTE — PROGRESS NOTES
Attempted to place small bowel feeding tube using Envue guidance system . I was unable to place tube beyond larynx and pharynx area due to tube wanting to advance into lungs. Dr poole.

## 2021-10-19 NOTE — CARE COORDINATION
[employer] following, unable to pass repeat swallow today. Will require a precert prior to discharge as he was initially denied for [address]. HENS pending and ambulance on soft chart. [facility] cannot accept with envue tube. Discussed with son at bedside and other [facility] choices that may be able to accept this type of feeding tube. He will discuss with sister and let me know next choice. For questions I can be reached at [phone].  [place], [place]

## 2021-10-19 NOTE — PROGRESS NOTES
SPEECH/LANGUAGE PATHOLOGY  VIDEOFLUOROSCOPIC STUDY OF SWALLOWING (MBS)   and PLAN OF CARE    PATIENT NAME:  Wayne Zamora  (male)     MRN:  [id number]    :  1925  (80 y.o.)  STATUS:  Inpatient: Room 4503/4503-A    TODAY'S DATE:  10/19/2021  REFERRING PROVIDER:  Dr. Jessa Hartman: [place] modified barium swallow with video  Date of order:  10-18-21   REASON FOR REFERRAL: dysphagia   EVALUATING THERAPIST: EDWAR Carmona      RESULTS:      DYSPHAGIA DIAGNOSIS: severe oropharyngeal phase dysphagia     Laryngeal Penetration and Aspiration:    Penetration WITH aspiration was observed in today's study with  Nectar liquids and pureed foods     DIET RECOMMENDATIONS:  NPO      FEEDING RECOMMENDATIONS:    Assistance level:  Not applicable     Compensatory strategies recommended: Not applicable     Discussed recommendations with nursing and/or faxed report to referring provider: No secondary to no diet/liquid change recommended     SPEECH THERAPY  PLAN OF CARE   The dysphagia POC is established based on physician order and dysphagia diagnosis    Skilled SLP intervention for dysphagia management on acute care 3-5 x per week until goals met, pt plateaus in function and/or discharged from hospital      Conditions Requiring Skilled Therapeutic Intervention for dysphagia:    Oral motor strength/coordination impairment  Reduced pharyngeal clearing of the bolus  Reduced laryngeal closure resulting in penetration  Reduced laryngeal closure resulting in aspiration     SPECIFIC DYSPHAGIA INTERVENTIONS TO INCLUDE:     Therapeutic exercises    Specific instructions for next treatment:  initiate instruction of therapeutic exercises   Treatment Goals:    Short Term Goals:  Pt will complete oral motor strength/ coordination exercises to improve bolus prep/ control and mastication with  moderate verbal prompts .   Pt will complete BOTR strength/ ROM exercises to reduce pharyngeal residuals and improve epiglottic inversion PENETRATION   Laryngeal penetration occurred prior to aspiration. Further details under aspiration section. ASPIRATION  Aspiration occurred  DURING AND AFTER the swallow for nectar consistency liquid  And pudding due inadequate laryngeal elevation and closure and pharyngeal residuals . Aspiration was moderate-severe and occurred consistently . a delayed cough was noted    PENETRATION-ASPIRATION SCALE (PAS):  THIN item not administered  MILDLY THICK 7 = Material enters the airway, passes below the vocal folds, and is not ejected from the trachea despite effort   MODERATELY THICK item not administered  PUREE  7 = Material enters the airway, passes below the vocal folds, and is not ejected from the trachea despite effort   HARD SOLID item not administered       COMPENSATORY STRATEGIES    Compensatory strategies that were not beneficial included Double swallow, cued cough/throat clear      STRUCTURAL/FUNCTIONAL ANOMALIES   No structural/functional anomalies were noted    CERVICAL ESOPHAGEAL STAGE :     The cervical esophagus appeared adequate          ___________    Cognition:   Confusion noted and Hard of hearing    Oral Peripheral Examination   Generalized oral weakness    Current Respiratory Status   4 liters nasal cannula     Parameters of Speech Production  Respiration:  Adequate for speech production  Quality:   Within functional limits  Intensity: Within functional limits    Pain: No pain reported. EDUCATION:   The Speech Language Pathologist (SLP) completed education regarding results of evaluation and that intervention is warranted at this time. Learner: Patient  Education: Reviewed results and recommendations of this evaluation  Evaluation of Education:  Needs further instruction    This plan may be re-evaluated and revised as warranted.         Evaluation Time includes thorough review of current medical information, gathering information on past medical history/social history and prior level of function, completion of standardized testing/informal observation of tasks, assessment of data and education on plan of care and goals. [x]The admitting diagnosis and active problem list, have been reviewed prior to initiation of this evaluation.     CPT Code: 74680  dysphagia study    ACTIVE PROBLEM LIST:   Patient Active Problem List   Diagnosis    Sepsis ([facility]    Pneumonia    CAD (coronary artery disease)    Hyperlipidemia    Dysphagia    Subdural hematoma ([facility]    H/O: CVA (cerebrovascular accident)    Essential hypertension    Tremors of nervous system    [facility]    Thrombocytopenia ([facility]    Anemia    Urinary retention    Drop attack    Protein-calorie malnutrition, moderate ([facility]    Delirium secondary to multiple medical problems    Postoperative anemia due to acute blood loss    Peptic ulcer disease with hemorrhage    Influenza B    Sepsis secondary to influenza B ([facility]    GI bleed    Multiple skin tears    Closed left hip fracture, initial encounter ([facility]    MARIETTA (acute kidney injury) ([facility]

## 2021-10-19 NOTE — PROGRESS NOTES
Called to room to speak with son. He is very upset with the amount of Physical Therapy his father has received, in his opinion it has not been enough. Requesting to speak with Manager of PT Department. Call placed. He is very upset that his father hasn't eaten. Attempted to explain results of swallow studies done, which patient has failed. Patients RN reaching out to Dr. Shanna Nuñez and ask that he talk with patient's son about all of his concerns as noted previously he spoke with the patients daughter.

## 2021-10-19 NOTE — PROGRESS NOTES
Physical Therapy  Facility/Department: [address] PICU  Daily Treatment Note  NAME: Halina Rose  : 1925  MRN: [id number]    Date of Service: 10/19/2021    Evaluating PT:  Navin Andrew PT, DPT  [id number]        Room #:  1140/4832-T  Diagnosis:  Closed left hip fracture, initial encounter [facility]) [S72.002A]  PMHx/PSHx:  CAD, R eye hemorrhage, tremors, raynaud disease, cerebral artery occlusion with infarct  Procedure/Surgery:  L hip hemiarthroplasty 10/14/21  Precautions:  Falls, L hip precautions, L hip WBAT, cognition, TSM, bed alarm, [place]  Equipment Needs:  TBD     SUBJECTIVE:     Pt lives with his daugther in [place] style home with 3 stairs to enter and B rails. Pt has permanent first floor set up with bed and bathroom on first floor. Pt ambulated with no AD indoors and FWW outdoors at baseline. Pt has  supervision/assistance of family vs caregivers. DME owned: rollator, FWW, canes, w/c     OBJECTIVE:    Initial Evaluation  Date: 10/15/21 Treatment  Date: 10/19/2021 Short Term/ Long Term   Goals   AM-PAC 6 Clicks      Was pt agreeable to Eval/treatment? yes yes     Does pt have pain? No c/o pain R thigh  and L hip  leg  No rating      Bed Mobility  Rolling: NT  Supine to sit: MaxAx2  Sit to supine: NT  Scooting:  Mod A to EOB Rolling: MaxA  Supine to sit: MaxA x2  Sit to supine: MaxA x2  Scooting: MaxA 2 to EOB- pt very fearful      See Comments   Rolling: Supervision   Supine to sit: Supervision   Sit to supine: Supervision   Scooting: Supervision    Transfers Sit to stand: ModA  Stand to sit: ModA  Stand pivot: ModA with FWW Sit to stand: MaxA x2  Stand to sit: MaxA 2  Stand pivot: max 2 with FWW    Return back to bed stand pivot HHA of 2 max assist  Sit to stand: Supervision   Stand to sit: Supervision   Stand pivot: Supervision with FWW   Ambulation    5 feet forward/backward with FWW with ModA NT >50 feet with FWW with Supervision    Stair negotiation: ascended and descended  NT NT 3 steps with B rails Supervision    ROM BUE:  WNL  BLE:  WNL       Strength BUE:  WNL  BLE:  Mild functional weakness, unable to follow cues for accurate MMT BLE:  Mild functional weakness, unable to follow cues for accurate MMT     Balance Sitting EOB:  ModA>CGA  Dynamic Standing:  ModA with FWW Sitting EOB:  CGA/min  Dynamic standing: MaxA x2 with FWW Sitting EOB:  Supervision   Dynamic Standing:  Supervision with FWW      Pt is A & O x 1 self only    Vitals: Therapeutic Exercises:    Transfer training    Patient education  Pt educated on role of therapy, safety with mobility, sequencing with bed mobility and transfers    Patient response to education:   Pt verbalized understanding Pt demonstrated skill Pt requires further education in this area   yes yes yes     ASSESSMENT:    Comments:  Pt resting supine upon arrival and son present. Pt performed supine rom ex and son educated on assisting with LE AAROM. Pt demonstrated rom ex on right leg and son educated on gentle small rom on L Leg due to hip arthroplasty. Son and pt educated on neutral positioning and hip precautions. Son did well with pt, and reminded son small rom abd, slr, heel slide and not real aggressive wih ex more so on pt's  L side. Everything within tolerance. Pt at times confused with movement, tremors/shakey and fearful. Pt requires maximal verbal cues through out. With transferring to eob from supine pt somewhat fearful and resistive. Pt did calm down with time and reassurance. Pt sat eob and pt performed small fwd/bkw movement and maintaining mid line sitting balance. Pt then stood max of 2 and co right leg pain with standing. Lowered walker to lowest position and pt stood again doing more upright posture with cues. Pt then performed pivot to chair max of 2 with festinating type of gait and confusion.   Being  As son was there and wanting pt up in chair,  left pt in chair with son sitting right beside pt and nursing aware.  Will check on putting pt back to bed after 15 minutes. Pt seemed more alert but requires cues to open his eyes. Pt also stiff with rom ex. Plan to give son paper copy of rom ex to do with pt. Returned around 12:15 and discussed with son on leaving pt up another hour and pt was doing well. Decided to leave up during lunch but PEG tube people came so pt returned back to bed. Pt transferred pt to his good right side with HHA of 2 with max assist.   Pt did better with sit to stand from chair although max assist of 2. With stand pivot pt somewhat fearful, festinating. Pt returned to bed and skillfully repositioned into supine and then hob elevated. Pt more alert and more talking more. Again educated son on positioning pt in neutral and protecting joints with feet elevated. Suggested to son to try and engage pt in conversation and bed activity such as gentle bed ex and reaching ex and pt also performed cervical rom ex. Son on board with, seems to have good understanding of situation. Son happy with situation and told son that at this time therapy should be getting pt oob due to pt's confusion, concerns for safety. Son would like to be involved with pt and would like to be present for therapy. Told him therapy will try to see pt in AM around 9: 00, but no guarantee on the time but will make every effort. Discussed with rehab manager also. Late afternoon around 400 dropped off copies of basic hip ex and isometric ex  - heel slides, slr, quad sets, glut sets  with precautions and explanation to the son. Son had no questions and grateful for the copies.             Treatment:  Patient practiced and was instructed in the following treatment:     Bed mobility: cues for sequencing, manual assist for B LE and trunk negotiation    Transfer training: cues for hand placement, manual assist for lift/lower, manual assist for balance and walker progression for bed<>chair   Gait training: cues for upright posture, manual assist for balance and walker progression   Therapeutic activity - rom ex, sitting eob   Pt and family education. PLAN:    Patient is making fair progress towards established goals. Will continue with current POC.         PLAN:    Time in  1102  Time out  1150   Time 12 15 to 1244     Total Treatment Time  48  Min  And 29     CPT codes:  [] Gait training 20203 0 minutes  [] Manual therapy 44858 0 minutes  [x] Therapeutic activities 64158 48  minutes  [] Therapeutic exercises 02602 0 minutes  [] Neuromuscular reeducation 00373 0 minutes      [facility] [place], PTA  [id number]

## 2021-10-19 NOTE — PROGRESS NOTES
Upon assessment patients congestion has worsened and 85% on 3L. Patient now on 5L SpO2 94%. Patient is coughing up scant amount of white mucous. Patient still sounding very congested. Dr. Сергей Henry notified and will order Chest XR and BMP. IV fluids held.  Will continue to monitor

## 2021-10-19 NOTE — PROGRESS NOTES
Occupational Therapy  OCCUPATIONAL THERAPY BEDSIDE TREATMENT NOTE   [address]     Date:10/19/2021  Patient Name: Sherryle Massy  MRN: [id number]  : 1925  Room: [address] [place] [address]     Evaluating OT: BEL Logan, OTR/L  # [id number]     Referring Provider:  Rosa Sexton DO  Specific Provider Orders:  Michael Smith and Treat\"  10-13-21     Diagnosis: Closed left hip fracture, initial encounter ([facility] Mirela Veras     Pt was admitted after falling at home. Sustained fracture L LE     Pertinent Medical History:  Pt has a past medical history of CAD (coronary artery disease), Cerebral artery occlusion with cerebral infarction ([facility] Hemorrhage, Raynaud disease, and Tremors of nervous system. ,  has a past surgical history that includes Coronary angioplasty with stent; Upper gastrointestinal endoscopy (N/A, 2019); and hip surgery (Left, 10/14/2021).      Surgeries this admission: 10-14-21:  Left hip hemiarthroplasty for displaced femoral neck fracture        Precautions:  Fall Risk  WBAT L LE  90* Hip Precautions  NPO - Feeding Tube     Assessment of current deficits   [x] Functional mobility                         [x]ADLs           [x] Strength                  [x]Cognition   [x] Functional transfers           [x] IADLs         [x] Safety Awareness   [x]Endurance   [x] Fine Coordination              [x] Balance      [] Vision/perception   []Sensation     [x]Gross Motor Coordination  [] ROM           [] Delirium                   [x] Motor Control         OT PLAN OF CARE   OT POC based on physician orders, patient diagnosis and results of clinical assessment     Frequency/Duration 1-3 days/wk for 2 weeks PRN   Specific OT Treatment to include:   * Instruction/training on adapted ADL techniques and AE recommendations to increase functional independence within precautions       * Training on energy conservation strategies, correct breathing pattern and techniques to improve independence/tolerance for self-care routine  * Functional transfer/mobility training/DME recommendations for increased independence, safety, and fall prevention  * Patient/Family education to increase follow through with safety techniques and functional independence  * Recommendation of environmental modifications for increased safety with functional transfers/mobility and ADLs  * Cognitive retraining/development of therapeutic activities to improve problem solving, judgement, memory, and attention for increased safety/participation in ADL/IADL tasks  * Therapeutic exercise to improve motor endurance, ROM, and functional strength for ADLs/functional transfers  * Therapeutic activities to facilitate/challenge dynamic balance, stand tolerance for increased safety and independence with ADLs  * Therapeutic activities to facilitate gross/fine motor skills for increased independence with ADLs  * Neuro-muscular re-education: facilitation of righting/equilibrium reactions, midline orientation, scapular stability/mobility, normalization of muscle tone, and facilitation of volitional active controled movement  * Positioning to improve skin integrity, interaction with environment and functional independence  * Delirium prevention/treatment  Other:     Recommended Adaptive Equipment: TBD as pt progresses      Home Living:  Pt lives in his home with his Dtr - 2-story house, Bed/bath on the 2nd floor.  (+) Basement. Bathroom setup:  Tub-Shower w/ Grab bar and Shower chair, High Commode w/ Grab bar  Equipment owned:  [employer], [employer]      Available Family Assist:  Dtr works ~ 10-12 hours/day - Pt has Aides for ~ 12 hours when Dtr is at Work. 24/7 SUP     Prior Level of Function:  Per Dtr, pt was IND with Dressing/Toileting, Transfers and Mobility using No AD for household ambulation. SUP for safety w/ bathing. Pt reported pt \"usually forgets to use his Cane. \"   Driving: No  Occupation:  Not Reported;  [place]     Pain Level:  Pt c/o pain in bilateral legs throughout treatment. Additional Complaints: Pt fatigue; closing eyes throughout treatment. Cognition: A & O x 1; self  Able to Follow Multi-Step Commands w/ Mod-Max VCs              Memory:  poor              Sequencing:  poor-              Problem solving:  poor-              Judgement/safety:  poor-                Functional Assessment:  AM-PAC Daily Activity Raw Score: 10/24       Initial Eval Status  Date: 10/15/21    Treatment Status  Date: 10/19/21 STGs = LTGs  Time frame: 10-14 days   Feeding Min A/Set up     Intention Tremor, varied in severity during task, initially required mod A to grasp container w/ L Hand and Mod A to grasp and manipulate Utensil w/ R hand, able to bring utensil to mouth, Improved to requiring Min A for steadying of tremors R UE only -able to feed self - uncertain if pt has Weighted utensils at home     N/T SUP/Set up   Grooming Min A/Set up     Min A for quality of task to wash hands/face/comb hair in semi-supine and seated in chair, Min VCs   Unsafe to attempt to ambulate to or stand at sink     Mod A; To wash face and comb hair while sitting up in the chair. Pt completed brushing teeth while in high sanderson position with Mod A and increased time. SUP/set up  Seated/Standing at the [employer] A/Set up     Mod A, Mod VCs to don robe seated in chair  Unsafe to attempt item retrieval for activities    Max A; To simulate kristal/doff of gown while sitting up on the EOB. SUP/Set up      LB Dressing Dep     Max A of 1  to don socks, thread LEs into pants, pull pants over hips + Mod A of 1 for safety w/ dynamic standing balance (pants simulated) w/ use of [employer]  Pt ed for safe/adaptive techs    Dep; To complete kristal of socks while in supine; Dep A.      Mod A      Bathing NT       N/T   Mod A       Toileting NT     Ortiz Catheter  Requested [facility] for use in room     [place]iz tasks  Mild<>Moderate<>Moderately Severe Intention Tremors Naeem UEs      LUE WFL 4/5 proximally  4+/5 distally Good(-) ;    Fair(-) FMC/dexterity noted during ADL tasks         Sensation:  Denies numbness or tingling Naeem UEs   Tone: WFL Naeem UEs   Edema: None Noted Naeem UEs      Comments: Upon arrival, patient was found in supine with son in the room. He was agreeable to participate in therapeutic ax. Son stayed in room throughout session. Received permission from RN prior to engaging pt in OT services. Completed Transfers with assist of PT - pt required 2 skilled Therapists for safety. At end of first session pt stayed up in the chair for 15-20 minutes with son supervising patient. At the end of the session, patient was properly positioned in high sanderson position. Call light and phone within reach, all lines and tubes intact. Oriented pt to call bell. Made all appropriate Environmental Modifications to facilitate pt's level of IND and safety. All needs met. Son still at b/s. RN made aware of pt's position in chair. Overall patient demonstrated decreased independence and safety during completion of ADL/functional transfer/mobility tasks. Pt would benefit from continued skilled OT to increase safety and independence with completion of ADL/IADL tasks for functional independence and quality of life.      Time In: 11:03  Time Out: 11:49  &  Time In: 12:20  Time Out: 12:43  Total Treatment Time: 69 minutes    Min Units   OT Eval Low 66211     OT Eval Medium 23537     OT Eval High 91200     OT Re-Eval 48311     Therapeutic Ex 49369     Therapeutic Activities 90213 89 6   ADL/Self Care 36085 27 2   Orthotic Management 77800       Manual 88816 27 2   Neuro Re-Ed [address]       Non-Billable Time          Alyssa Cueto 46, 50 [facility]

## 2021-10-19 NOTE — CONSULTS
GENERAL SURGERY  CONSULT NOTE    Patient's Name/Date of Birth: Ju Medina /8/7/1925 (41 y.o.)    Date: October 19, 2021     CC: dysphagia    HPI:  Ju Medina is a 80 y.o. male who presents with dysphagia. Patient with PMH of CAD, CVA, and tremors that presented on 10/13 for a fall with femoral neck fracture and underwent a left hip hemiarthoplasty on 10/14/21. Daughter and son at bedside state he was in his usual health prior to the fall which includes ambulating and eating but has not been able to eat over the past many days. He underwent a MBS today which demonstrated severe oropharyngeal dysphagia. IV team attempted CorPak and failed as such general surgery was consulted. He has never had abdominal surgeries and not on [address]      Past Medical History:   Diagnosis Date    CAD (coronary artery disease)     Cerebral artery occlusion with cerebral infarction ([facility] [address]     Hemorrhage     right eye    Raynaud disease     Tremors of nervous system        Past Surgical History:   Procedure Laterality Date    CORONARY ANGIOPLASTY WITH STENT PLACEMENT      HIP SURGERY Left 10/14/2021    HIP HEMIARTHROPLASTY performed by Le Beltre MD at [facility] N/A 11/13/2019    EGD BIOPSY performed by Vince Vizcarra MD at [place] [address] Facility-Administered Medications   Medication Dose Route Frequency Provider Last Rate Last Admin    dextrose 10 % infusion   IntraVENous Continuous Ange Black MD 50 mL/hr at 10/19/21 1242 New Bag at 10/19/21 1242    0.9 % sodium chloride infusion   IntraVENous PRN Stella Cline, APRN - CNP        barium sulfate 40 % reconsitutable suspension  15 mL Oral ONCE PRN Fani Webb MD        piperacillin-tazobactam (ZOSYN) 3,375 mg in dextrose 5 % 100 mL IVPB extended infusion (mini-bag)  3,375 mg IntraVENous Beata Cary MD   Stopped at 10/19/21 1530    0.9 % sodium chloride infusion admixture  25 mL IntraVENous Q8H Jihan Syed MD   Stopped at 10/17/21 0830    oxyCODONE (ROXICODONE) immediate release tablet 5 mg  5 mg Oral Q4H PRN Wang Urbano MD        Or   Diane Belle oxyCODONE (ROXICODONE) immediate release tablet 2.5 mg  2.5 mg Oral Q4H PRN Wang Urbano MD        morphine (PF) injection 2 mg  2 mg IntraVENous Q3H PRN Wang Urbano MD        Or   Diane Belle morphine (PF) injection 1 mg  1 mg IntraVENous Q3H PRN Wang Urbano MD        sodium chloride (OCEAN, BABY AYR) 0.65 % nasal spray 1 spray  1 spray Each Nostril PRN Jade Jacome MD        GenTeal Tears 0.1-0.2-0.3 % SOLN 1 drop  1 drop Both Eyes PRN Wang Urbano MD        Vitamin D (CHOLECALCIFEROL) tablet 2,000 Units  2,000 Units Oral Daily Alesha Govern, DO   2,000 Units at 10/15/21 0851    tranexamic acid (CYKLOKAPRON) irrigation 1,000 mg  1,000 mg Irrigation Once Alesha Govern, DO        sodium chloride flush 0.9 % injection 5-40 mL  5-40 mL IntraVENous 2 times per day Alesha Govern, DO   10 mL at 10/19/21 0900    sodium chloride flush 0.9 % injection 5-40 mL  5-40 mL IntraVENous PRN Alesha Govern, DO   10 mL at 10/15/21 0446    0.9 % sodium chloride infusion  25 mL IntraVENous PRN Alesha Govern, DO        sennosides-docusate sodium (SENOKOT-S) 8.6-50 MG tablet 1 tablet  1 tablet Oral BID Alesha Govern, DO   1 tablet at 10/15/21 2224    acetaminophen (TYLENOL) tablet 650 mg  650 mg Oral Q6H Alesha Govern, DO   650 mg at 10/15/21 2224    promethazine (PHENERGAN) tablet 12.5 mg  12.5 mg Oral Q6H PRN Alesha Govern, DO        Or    ondansetron [facility]) injection 4 mg  4 mg IntraVENous Q6H PRN Alesha Govern, DO        0.9 % sodium chloride infusion   IntraVENous PRN Morene Primrose, MD        sodium chloride flush 0.9 % injection 10 mL  10 mL IntraVENous Once Alesha Garcia DO        donepezil (ARICEPT) tablet 10 mg  10 mg Oral Nightly Alesha Garcia DO   10 mg at 10/15/21 2222    [Held by provider] enalapril (VASOTEC) tablet 2.5 mg  2.5 mg Oral Daily Joyce Kyler, APRN - CNP        pantoprazole (PROTONIX) tablet 40 mg  40 mg Oral Daily with breakfast Alesha Govern, DO   40 mg at 10/15/21 0846    pravastatin (PRAVACHOL) tablet 20 mg  20 mg Oral Daily Alesha Govern, DO   20 mg at 10/15/21 0846    primidone (MYSOLINE) tablet 50 mg  50 mg Oral BID Alesha Govern, DO   50 mg at 10/15/21 1801    sertraline (ZOLOFT) tablet 25 mg  25 mg Oral Daily Alesha Govern, DO   25 mg at 10/15/21 0846    sodium chloride flush 0.9 % injection 5-40 mL  5-40 mL IntraVENous 2 times per day Alesha Govern, DO   10 mL at 10/19/21 0900    sodium chloride flush 0.9 % injection 5-40 mL  5-40 mL IntraVENous PRN Alesha Govern, DO        0.9 % sodium chloride infusion  25 mL IntraVENous PRN Alesha Govern, DO        enoxaparin (LOVENOX) injection 40 mg  40 mg SubCUTAneous Daily Alesha , DO   40 mg at 10/19/21 1152    magnesium hydroxide (MILK OF MAGNESIA) 400 MG/5ML suspension 30 mL  30 mL Oral Daily PRN Alesha Govern, DO           Allergies   Allergen Reactions    Ativan [Lorazepam] Other (See Comments)     Per family- makes pt more agitated and anxious    Clindamycin/Lincomycin     Pcn [Penicillins]        No family history on file. Social History     Socioeconomic History    Marital status:       Spouse name: Not on file    Number of children: Not on file    Years of education: Not on file    Highest education level: Not on file   Occupational History    Not on file   Tobacco Use    Smoking status: Former Smoker     Types: Cigarettes     Quit date: 1976     Years since quittin.8    Smokeless tobacco: Never Used   Vaping Use    Vaping Use: Never used   Substance and Sexual Activity    Alcohol use: Not Currently     Comment:      Drug use: No    Sexual activity: Not Currently   Other Topics Concern    Not on file   Social History Narrative    Not on file     Social Determinants of Health     Financial Resource Strain: Low Risk     Difficulty of Paying the left femoral head, contributing to moderate \"bearden's crook\" configuration, further accompanied by mild surrounding soft tissue swelling. The left hip otherwise demonstrates severe superior joint space narrowing and mild secondary osteoarthritic degenerative disease, also present to a slightly lesser degree about the remaining incidentally-visualized major pelvic articulations. Very minimal irregular enthesopathy involves the left patellar superior pole. There is background diffuse osteopenia. Variable, minimal to moderate scattered atherosclerotic calcifications are most notable posterior to the mid/distal left femoral metadiaphysis. No other plain radiographic abnormalities are noted. .     1. Acute oblique fracture through the left subcapital region, with up to 2.5 cm of override/superomedial displacement of the left femoral shaft relative to the left femoral head, contributing to moderate \"bearden's crook\" configuration, further accompanied by mild surrounding soft tissue swelling. 2. Osteopenia and osseous degenerative disease, as described. 3.  Vascular calcifications, as described. . RECOMMENDATION: 1. Recommend orthopedic consultation, with post-treatment follow-up imaging, as directed clinically. .     CT HEAD WO CONTRAST    Result Date: 10/13/2021  EXAMINATION: CT OF THE HEAD WITHOUT CONTRAST  10/13/2021 4:49 pm TECHNIQUE: CT of the head was performed without the administration of intravenous contrast. Dose modulation, iterative reconstruction, and/or weight based adjustment of the mA/kV was utilized to reduce the radiation dose to as low as reasonably achievable. COMPARISON: August 13 HISTORY: ORDERING SYSTEM PROVIDED HISTORY: trauma TECHNOLOGIST PROVIDED HISTORY: Has a \"code stroke\" or \"stroke alert\" been called? ->No Reason for exam:->trauma Decision Support Exception - unselect if not a suspected or confirmed emergency medical condition->Emergency Medical Condition (MA) What reading provider previously-demonstrated/described, chronic, multilevel, asymmetric vertebral compression deformities, related vertebral column curvature/alignment abnormalities, degenerative disease, central spinal canal stenoses, neural foraminal narrowing, and background diffuse osteopenia, all appear not significantly changed. SOFT TISSUES: Laryngeal structures are slightly distorted by patient motion. Precervical/retropharyngeal soft tissues have an otherwise grossly unremarkable noncontrast CT appearance. SPINAL CORD/BRAIN: Not well demonstrated by this technique. Please see report for concurrent comparison studies for further comments regarding those structures. VASCULAR: Minimal to mild scattered atherosclerotic calcifications are most notable within the visualized proximal aortic arch branches, followed by both common carotid arterial bifurcations. LUNG APICES/SUPERIOR MEDIASTINUM: There is mild biapical reticulonodular pleural/parenchymal scarring and centrilobular emphysema, as well as minimal gaseous distension of the visualized esophagus at the level of the thoracic inlet. .     1. When compared to Noncontrast CT of the Cervical Spine 08/13/2021, all previously-demonstrated/described, chronic, multilevel, asymmetric vertebral compression deformities, related vertebral column curvature/alignment abnormalities, degenerative disease, central spinal canal stenoses, neural foraminal narrowing, and background diffuse osteopenia, all appear not significantly changed. 2.  Otherwise, no significant change.  .     XR CHEST PORTABLE    Result Date: 10/13/2021  EXAMINATION: ONE XRAY VIEW OF THE CHEST  10/13/2021 13:23 COMPARISON: AP portable chest radiograph 08/13/2012 HISTORY: ORDERING SYSTEM PROVIDED HISTORY: chest pain TECHNOLOGIST PROVIDED HISTORY: Reason for exam:->chest pain What reading provider will be dictating this exam?->CRC FINDINGS: There is unchanged moderate bilateral perihilar reticulonodular coarsening of interstitial markings, radiating peripherally. There is mild cardiomegaly and equivalent central pulmonary vascular fullness. Trace to mild/moderate scattered atherosclerotic calcifications are most notable within the aortic arch. The descending thoracic aorta is mildly tortuous. There are unchanged, chronic, multilevel asymmetric vertebral compression deformities, related vertebral column curvature/alignment abnormalities, osseous degenerative disease, and background diffuse osteopenia. No other clinically-significant changes are noted. .     1. Radiographically-negative for acute process, with no significant change. .     CTA PULMONARY W CONTRAST    Result Date: 10/13/2021  EXAMINATION: CTA OF THE CHEST 10/13/2021 2:49 pm TECHNIQUE: CTA of the chest was performed after the administration of intravenous contrast.  Multiplanar reformatted images are provided for review. MIP images are provided for review. Dose modulation, iterative reconstruction, and/or weight based adjustment of the mA/kV was utilized to reduce the radiation dose to as low as reasonably achievable. COMPARISON: None. HISTORY: ORDERING SYSTEM PROVIDED HISTORY: pe TECHNOLOGIST PROVIDED HISTORY: Reason for exam:->pe Decision Support Exception - unselect if not a suspected or confirmed emergency medical condition->Emergency Medical Condition (MA) What reading provider will be dictating this exam?->CRC FINDINGS: Note that there was a delay in receiving images. Thyroid: No thyroid nodules warranting ultrasound are seen. Aorta:No acute thoracic aortic injury or thoracic aortic aneurysm. Moderate diffuse calcified and soft plaque in the thoracic aorta and great vessels. Pulmonary Arteries: No evidence of pulmonary embolism. Heart: The heart is not significantly enlarged. No pericardial effusion. Mediastinum/Lymph Nodes: Prominent subcentimeter short axis mediastinal and hilar lymph nodes noted. No evidence of axillary lymphadenopathy bilaterally. Pleura: No pneumothorax or pleural effusion. Lungs: No consolidation is seen. The central airways are patent. Extensive centrilobular emphysema bilaterally, greater in the upper lungs. Minimal dependent atelectasis bilaterally. Soft Tissues/Bones: Degenerative changes of the spine Upper Abdomen: Punctate calcification versus HELEN in the liver. Small hiatal hernia. Note that there was a delay in receiving images. No evidence of pulmonary embolism. No evidence of acute aortic injury. Extensive emphysema. Extensive atherosclerotic disease. Assessment/Plan:  80 y.o. male with dysphagia and need for enteral access        Patient did have moderate functional status prior to his fall on 10/13 and was swallowing per daughter and son at bedside  Agree for now will attempt CorPak placement to see if this improves strength and function for future swallowing evals; however, did discuss need for possible PEG placement if this does not return. Also, discussed palliative feeding as an alterative to all of the above. Son and daughter in agreement will plan for EGD with CorPak placement tomorrow with anesthesia since patient had multiple failed Corpak placements by IV team and myself. If the do not agree as a family unit then they will not give consent tonight.    Plan discussed with Dr. Aidan Abreu M.D., Ph.D., PGY-4  10/19/2021  4:43 PM

## 2021-10-20 NOTE — PROGRESS NOTES
Occupational Therapy  OCCUPATIONAL THERAPY BEDSIDE TREATMENT NOTE   [address]     Date:10/20/2021  Patient Name: Leon Rose  MRN: [id number]  : 1925  Room: [address] [place] [address]     Evaluating OT: BEL No, OTR/L  # [id number]     Referring Provider:  Bharat Pardo DO  Specific Provider Orders:  Reyes Parisian and Treat\"  10-13-21     Diagnosis: Closed left hip fracture, initial encounter ([facility] [address] [facility]     Pt was admitted after falling at home. Sustained fracture L LE     Pertinent Medical History:  Pt has a past medical history of CAD (coronary artery disease), Cerebral artery occlusion with cerebral infarction ([facility] [address] Hemorrhage, Raynaud disease, and Tremors of nervous system. ,  has a past surgical history that includes Coronary angioplasty with stent; Upper gastrointestinal endoscopy (N/A, 2019); and hip surgery (Left, 10/14/2021).      Surgeries this admission: 10-14-21:  Left hip hemiarthroplasty for displaced femoral neck fracture        Precautions:  Fall Risk  WBAT L LE  Anterior Hip Precautions  NPO - Feeding Tube     Assessment of current deficits   [x] Functional mobility                         [x]ADLs           [x] Strength                  [x]Cognition   [x] Functional transfers           [x] IADLs         [x] Safety Awareness   [x]Endurance   [x] Fine Coordination              [x] Balance      [] Vision/perception   []Sensation     [x]Gross Motor Coordination  [] ROM           [] Delirium                   [x] Motor Control         OT PLAN OF CARE   OT POC based on physician orders, patient diagnosis and results of clinical assessment     Frequency/Duration 1-3 days/wk for 2 weeks PRN   Specific OT Treatment to include:   * Instruction/training on adapted ADL techniques and AE recommendations to increase functional independence within precautions       * Training on energy conservation strategies, correct breathing pattern and techniques to improve independence/tolerance for self-care routine  * Functional transfer/mobility training/DME recommendations for increased independence, safety, and fall prevention  * Patient/Family education to increase follow through with safety techniques and functional independence  * Recommendation of environmental modifications for increased safety with functional transfers/mobility and ADLs  * Cognitive retraining/development of therapeutic activities to improve problem solving, judgement, memory, and attention for increased safety/participation in ADL/IADL tasks  * Therapeutic exercise to improve motor endurance, ROM, and functional strength for ADLs/functional transfers  * Therapeutic activities to facilitate/challenge dynamic balance, stand tolerance for increased safety and independence with ADLs  * Therapeutic activities to facilitate gross/fine motor skills for increased independence with ADLs  * Neuro-muscular re-education: facilitation of righting/equilibrium reactions, midline orientation, scapular stability/mobility, normalization of muscle tone, and facilitation of volitional active controled movement  * Positioning to improve skin integrity, interaction with environment and functional independence  * Delirium prevention/treatment  Other:     Recommended Adaptive Equipment: TBD as pt progresses      Home Living:  Pt lives in his home with his Dtr - 2-story house, Bed/bath on the 2nd floor.  (+) Basement. Bathroom setup:  Tub-Shower w/ Grab bar and Shower chair, High Commode w/ Grab bar  Equipment owned:  [facility], [employer]      Available Family Assist:  Dtr works ~ 10-12 hours/day - Pt has Aides for ~ 12 hours when Dtr is at Work. 24/7 SUP     Prior Level of Function:  Per Dtr, pt was IND with Dressing/Toileting, Transfers and Mobility using No AD for household ambulation. SUP for safety w/ bathing. Pt reported pt \"usually forgets to use his Cane. \"   Driving: No  Occupation:  Not Reported;  [employer]     Pain Level:  Pt c/o LLE pain    Additional Complaints: Pt fatigue; closing eyes throughout treatment. Cognition: A & O x 1; self  Able to Follow Multi-Step Commands w/ Max VCs              Memory:  poor              Sequencing:  poor              Problem solving:  poor              Judgement/safety:  poor                Functional Assessment:  AM-PAC Daily Activity Raw Score: 11/24       Initial Eval Status  Date: 10/15/21    Treatment Status  Date: 10/20/21 STGs = LTGs  Time frame: 10-14 days   Feeding Min A/Set up     Intention Tremor, varied in severity during task, initially required mod A to grasp container w/ L Hand and Mod A to grasp and manipulate Utensil w/ R hand, able to bring utensil to mouth, Improved to requiring Min A for steadying of tremors R UE only -able to feed self - uncertain if pt has Weighted utensils at home     per last tx SUP/Set up   Grooming Min A/Set up     Min A for quality of task to wash hands/face/comb hair in semi-supine and seated in chair, Min VCs   Unsafe to attempt to ambulate to or stand at sink     Mod A; To wash face seated in bedside chair     SUP/set up  Seated/Standing at the Sink   UB Dressing Mod A/Set up     Mod A, Mod VCs to don robe seated in chair  Unsafe to attempt item retrieval for activities    Max A; To simulate kristal/doff of gown while sitting up on the EOB. SUP/Set up      LB Dressing Dep     Max A of 1  to don socks, thread LEs into pants, pull pants over hips + Mod A of 1 for safety w/ dynamic standing balance (pants simulated) w/ use of [employer]  Pt ed for safe/adaptive techs    Dep; To don/doff socks seated     Mod A      Bathing NT       Max A  Simulated seated in bedside chair   Mod A       Toileting NT     Ortiz Catheter  Requested BSC for use in room     Ortiz  Mod A      Bed Mobility  Supine to sit:  Max A of 2  Sit to supine:  NT      Mod VCs for safety, adherence to hip precautions, initially required Max A for upright position at EOB, gradually improved to Close SUP seated EOB w/ VCs and Tactile cues, placement of R UE to steady self     Sup <> sit: Max A of 2  Educated pt on technique to increase independence. Supine to sit: Min A  Sit to supine: Min A      Functional Transfers Mod A     EOB 1x, Chair 2x  Pt ed for safety/hand placement    Max A - sit<->stand    From EOB and chair with use of w/w and verbal prompts to improve body alignment. Min A      Functional Mobility Mod A w/ [employer]     B/t surfaces, short distance at b/s - seated rest break b/t trials  Pt ed for safety/improved safety awareness, walker safety     Max A x 2-SPT using w/w  Cuing on body mechanics    Min A      Balance Sitting:     Static:  Max A -> Close SUP unsupported at EOB, SUP for safety in armed chair    Dynamic:  Close SUP w/ functional ax EOB, chair      Standing:     Static:  Mod A w/ [employer]    Dynamic:  Mod A w/ [employer] for functional ax/mobility      Sitting: Min A      Standing: Max A x 2 Sitting:     Static:  Remote SUP    Dynamic:  SUP w/ functional ax       Standing:     Static:  SUP w/ [employer]    Dynamic:  Min A w/ functional ax/mobility w/ [employer]   Activity Tolerance Fair     Limited by general weakness, fatigue, pain, tremors, cognition    Poor+    Fair(+)   Visual/  Perceptual     Hearing: WNL   Glasses: None at b/s     [facility]  Hearing Aids:  Per Dtr, has hearing aids but does not wear them                          Comments: Upon arrival, patient was found in supine in bed. Pt educated on techniques to increase independence and safety during ADL's, bed mobility, and functional transfers. At end of first session pt stayed up in the chair with son supervising patient. Call light and phone within reach, all lines and tubes intact. Oriented pt to call bell. Made all appropriate Environmental Modifications to facilitate pt's level of IND and safety. All needs met. Son still at b/s.    Returned to room in afternoon to assist pt to return to bed with daughter present. Levels above. Overall patient demonstrated decreased independence and safety during completion of ADL/functional transfer/mobility tasks. Pt would benefit from continued skilled OT to increase safety and independence with completion of ADL/IADL tasks for functional independence and quality of life.      Time In: 9:05  Time Out: 9:45  &  Time In: 1:30  Time Out: 1:40  Total Treatment Time: 50 minutes    Min Units   OT Eval Low 19669     OT Eval Medium 92024     OT Eval High [id number]     OT Re-Eval [id number]     Therapeutic Ex 23777     Therapeutic Activities 65813 40 3   ADL/Self Care 33721 10 1   Orthotic Management 51433       Manual 99190     Neuro Re-Ed [id number]       Non-Billable Time          ASH Marrero/BELKYS [id number]

## 2021-10-20 NOTE — PROGRESS NOTES
Chief Complaint:  Chief Complaint   Patient presents with    Fall     transfer from [facility] left hip frx. Closed left hip fracture, initial encounter ([address]     Subjective:    Patient is awake and interactive. Reports only mild pain L hip. No other complaints. Family has opted for PEG tube rather than NG tube, and given difficulties with NG tube placement yesterday    Previously I was told by a family member that he was eating 3 square meals per day without issues. However I received further information today that perhaps he has actually been on puréed food even prior to this admission due to known aspiration and swallowing difficulties. He denies any dyspnea at this time. No cough    Objective:    BP (!) 149/64   Pulse 88   Temp 97.7 °F (36.5 °C) (Temporal)   Resp 16   Ht 5' 9\" (1.753 m)   Wt 145 lb 1 oz (65.8 kg)   SpO2 90%   BMI 21.42 kg/m²     Current medications that patient is taking have been reviewed. General appearance: NAD, conversant, very frail appearing  HEENT: AT/NC, MMM  Neck: FROM, supple  Lungs: Clear to auscultation, WOB normal  CV: RRR, no MRGs  Abdomen: Soft, non-tender; no masses or HSM, +BS  Extremities: No peripheral edema or digital cyanosis  Skin: no rash, lesions or ulcers  Psych: Calm and cooperative  Neuro: Alert and interactive, face symmetric, moving all extremities, speech fluent. Resting jaw tremor, masked facies, and resting bilateral hand tremor. Muscle tone reasonably normal without cogwheeling.     Labs:  CBC with Differential:    Lab Results   Component Value Date    WBC 5.3 10/20/2021    RBC 2.62 10/20/2021    HGB 8.0 10/20/2021    HCT 24.4 10/20/2021     10/20/2021    MCV 93.1 10/20/2021    MCH 30.5 10/20/2021    MCHC 32.8 10/20/2021    RDW 15.0 10/20/2021    LYMPHOPCT 16.5 10/20/2021    MONOPCT 11.6 10/20/2021    BASOPCT 0.4 10/20/2021    MONOSABS 0.62 10/20/2021    LYMPHSABS 0.88 10/20/2021    EOSABS 0.20 10/20/2021    BASOSABS 0.02 10/20/2021 CMP:    Lab Results   Component Value Date     10/20/2021    K 3.8 10/20/2021    K 4.8 10/15/2021     10/20/2021    CO2 24 10/20/2021    BUN 13 10/20/2021    CREATININE 1.1 10/20/2021    GFRAA >60 10/20/2021    LABGLOM >60 10/20/2021    GLUCOSE 139 10/20/2021    PROT 7.9 10/13/2021    LABALBU 4.1 10/13/2021    CALCIUM 8.2 10/20/2021    BILITOT 0.4 10/13/2021    ALKPHOS 126 10/13/2021    AST 27 10/13/2021    ALT 13 10/13/2021        Assessment/Plan:  Principal Problem:    Closed left hip fracture, initial encounter ([facility]  Active Problems:    Pneumonia    CAD (coronary artery disease)    H/O: CVA (cerebrovascular accident)    Essential hypertension    Dementia (HCC)    Thrombocytopenia (HCC)    Anemia    Postoperative anemia due to acute blood loss    MARIETTA (acute kidney injury) ([facility] [address]  Resolved Problems:    * No resolved hospital problems. *       Still unable to swallow safely after surgery    Neuro consult appreciated, they also felt he may have Parkinson's. They will do a trial of Sinemet. PEG today. Case discussed with both neuro and general surgery    This morning turned up to 5 L but later we discovered he is not really requiring that amount of oxygen. Last I heard he was 99% on 2 L, presumably can taper down his O2 even further. New chest x-ray again reviewed. Very very mild interstitial markings, unclear if much of an acute component.     Continue antibiotics for possible pneumonia    Continue D10 until PEG is done, then can start tube feeds    Requires continued inpatient level of care   Ange Black MD    5:05 PM  10/20/2021

## 2021-10-20 NOTE — PROGRESS NOTES
Physical Therapy  Facility/Department: [address] PICU  Daily Treatment Note  NAME: Ashish Rowan  : 1925  MRN: [id number]    Date of Service: 10/20/2021    Evaluating PT:  Sera Saunders, PT, DPT  [id number]        Room #:  5596/3781-J  Diagnosis:  Closed left hip fracture, initial encounter [facility]) [S72.002A]  PMHx/PSHx:  CAD, R eye hemorrhage, tremors, raynaud disease, cerebral artery occlusion with infarct  Procedure/Surgery:  L hip hemiarthroplasty 10/14/21  Precautions:  Falls, L hip precautions, L hip WBAT, cognition, TSM, bed alarm, [place]  Equipment Needs:  TBD     SUBJECTIVE:     Pt lives with his daugther in [place] style home with 3 stairs to enter and B rails. Pt has permanent first floor set up with bed and bathroom on first floor. Pt ambulated with no AD indoors and FWW outdoors at baseline. Pt has  supervision/assistance of family vs caregivers. DME owned: rollator, FWW, canes, w/c     OBJECTIVE:    Initial Evaluation  Date: 10/15/21 Treatment  Date: 10/20/2021 Short Term/ Long Term   Goals   AM-PAC 6 Clicks      Was pt agreeable to Eval/treatment? yes yes     Does pt have pain? No c/o pain No c/o pain     Bed Mobility  Rolling: NT  Supine to sit: MaxAx2  Sit to supine: NT  Scooting:  Mod A to EOB Rolling: MaxA  Supine to sit: MaxA x2  Sit to supine: N/T  Scooting: MaxA to EOB Rolling: Supervision   Supine to sit: Supervision   Sit to supine: Supervision   Scooting: Supervision    Transfers Sit to stand: [address]  Stand to sit: ModA  Stand pivot: ModA with FWW Sit to stand: MaxA x2  Stand to sit: MaxA   Stand pivot: MaxA with FWW Sit to stand: Supervision   Stand to sit: Supervision   Stand pivot: Supervision with FWW   Ambulation    5 feet forward/backward with FWW with ModA 3 feet x3 reps with ww MaxA  >50 feet with FWW with Supervision    Stair negotiation: ascended and descended  NT NT 3 steps with B rails Supervision    ROM BUE:  WNL  BLE:  WNL       Strength BUE:  WNL  BLE:  Mild functional weakness, unable to follow cues for accurate MMT BLE:  Mild functional weakness, unable to follow cues for accurate MMT     Balance Sitting EOB:  ModA>CGA  Dynamic Standing:  ModA with FWW Sitting EOB:  ModA  Dynamic standing: MaxA x2 with FWW Sitting EOB:  Supervision   Dynamic Standing:  Supervision with FWW      Pt is A & O x 1 self only  Sensation:  Pt denies numbness and tingling to extremities  Edema:  unremarkable      Vitals:  98% on 5 liters O2 throughout tx session. Therapeutic Exercises:    AAROM ankle pumps 15x, AAROM heel slides 5x, AAROM LAQS 10x, sit <> stand x4 reps. Patient education  Pt educated on role of therapy, safety with mobility, sequencing with bed mobility and transfers    Patient response to education:   Pt verbalized understanding Pt demonstrated skill Pt requires further education in this area   yes yes yes     ASSESSMENT:    Comments: Pt cleared by nurse prior to tx session. Pt found in bed and agreed to tx. Pt's son present throughout. Pt still requires heavy assist for bed mobility, transfers and amb with ww. Pt requires constant cues to keep eyes open and to follow directions. Pt's O2 monitored throughout. Pt required assistance and cues for ex in supine and sitting in chair. Pt with heavy posterior lean during transfers and amb. Pt B feet blocked during transfers. Pt amb short distance with short shuffled steps and requires assistance for ww management as well. Pt remained in chair with pt's son present. Pt given call light.      Treatment:  Patient practiced and was instructed in the following treatment:     Bed mobility: cues for sequencing, manual assist for B LE and trunk negotiation    Transfer training: cues for hand placement, manual assist for lift/lower, manual assist for balance and walker progression for bed<>chair   Gait training: cues for upright posture, manual assist for balance and walker progression    PLAN:    Patient is making continued

## 2021-10-20 NOTE — PROGRESS NOTES
Chief Complaint:  Chief Complaint   Patient presents with    Fall     transfer from [facility] fr[facility]. Closed left hip fracture, initial encounter ([address]     Subjective:    Patient is awake and interactive. Reports only mild pain L hip. No other complaints. Failed swallow eval again. Son is quite concerned he's been without PO nutrition for several days now. Agreeable to Neuro consult again today    Objective:    BP (!) 153/58   Pulse 119   Temp 98.2 °F (36.8 °C) (Temporal)   Resp 16   Ht 5' 9\" (1.753 m)   Wt 145 lb 1 oz (65.8 kg)   SpO2 93%   BMI 21.42 kg/m²     Current medications that patient is taking have been reviewed. General appearance: NAD, conversant, very frail appearing  HEENT: AT/NC, MMM  Neck: FROM, supple  Lungs: Clear to auscultation, WOB normal  CV: RRR, no MRGs  Abdomen: Soft, non-tender; no masses or HSM, +BS  Extremities: No peripheral edema or digital cyanosis  Skin: no rash, lesions or ulcers  Psych: Calm and cooperative  Neuro: Alert and interactive, face symmetric, moving all extremities, speech fluent. Resting jaw tremor, masked facies, and resting bilateral hand tremor. Muscle tone reasonably normal without cogwheeling.     Labs:  CBC with Differential:    Lab Results   Component Value Date    WBC 4.8 10/19/2021    RBC 2.50 10/19/2021    HGB 7.6 10/19/2021    HCT 23.5 10/19/2021     10/19/2021    MCV 94.0 10/19/2021    MCH 30.4 10/19/2021    MCHC 32.3 10/19/2021    RDW 15.4 10/19/2021    LYMPHOPCT 14.1 10/19/2021    MONOPCT 8.9 10/19/2021    BASOPCT 0.4 10/19/2021    MONOSABS 0.43 10/19/2021    LYMPHSABS 0.68 10/19/2021    EOSABS 0.16 10/19/2021    BASOSABS 0.02 10/19/2021     CMP:    Lab Results   Component Value Date     10/19/2021    K 3.8 10/19/2021    K 4.8 10/15/2021     10/19/2021    CO2 22 10/19/2021    BUN 17 10/19/2021    CREATININE 1.1 10/19/2021    GFRAA >60 10/19/2021    LABGLOM >60 10/19/2021    GLUCOSE 131 10/19/2021    PROT 7.9 10/13/2021    LABALBU 4.1 10/13/2021    CALCIUM 8.0 10/19/2021    BILITOT 0.4 10/13/2021    ALKPHOS 126 10/13/2021    AST 27 10/13/2021    ALT 13 10/13/2021        Assessment/Plan:  Principal Problem:    Closed left hip fracture, initial encounter ([facility] [address]  Active Problems:    Pneumonia    CAD (coronary artery disease)    H/O: CVA (cerebrovascular accident)    Essential hypertension    Dementia (HCC)    Thrombocytopenia (HCC)    Anemia    Postoperative anemia due to acute blood loss    MARIETTA (acute kidney injury) ([facility] [address]  Resolved Problems:    * No resolved hospital problems. *       Still unable to swallow safely after surgery    He looks parkinsonian to me. Consult Neuro to see if trial of dopaminergic medication might be reasonable    Repeat MBS unfortunately failed again    IV team attempted CorPak several times today at bedside unsuccessfully    Surgery consulted for possible endoscopic NGT placement    MARIETTA resolved    I was paged this morning that he sounded \"wet\" with increased O2 requirement. Lungs clear on my exam today. BNP has increased to 2,000. New CXR personally reviewed, does not look like CHF. Still with mild infiltrates c/w pneumonia. Resume fluids but will use lower rate: D10 at 50 cc/hr, and 20 IV Lasix x1.     Continue antibiotics    Requires continued inpatient level of care       Corona Baugh MD    8:49 PM  10/19/2021

## 2021-10-20 NOTE — CONSULTS
[facility]  Neurology Consult    Date:  10/20/2021  Patient Name:  Bessy Burnett  YOB: 1925  MRN: [id number]     PCP:  Emy Bland DO   Referring:  No ref. provider found      Chief Complaint: Transfer from [facility] for closed left hip fracture     History obtained from: EMR , patient    Assessment  Bessy Burnett is a 80 y.o.male with PMHx of CAD s/p PCI , Rt eye hemorrhage, CVA , raynaud disease, tremors of nervous system   presenting for evaluation of fall s/p hemiarthroplasty of left hip 10/14. Neurology was consulted for dysphagia, tremor and concerns for parkinsonism . Exam today does show resting tremors which occasionally improves on intention. He does have 3/4 positive symptoms (gait instability, resting tremor, cogwheel rigidity) which is consistent with Parkinsons. Plan  Start Sinemet 25/100 TID disintegrating tablet to see if theres improvement in the symptoms. Consider liquid formulation if unable to tolerate PO. History of Present Illness:  Bessy Burnett is a 80 y.o. male with PMHx of CAD s/p PCI , Rt eye hemorrhage, CVA , raynaud disease, tremors of nervous system   presenting for evaluation of fall. Patient had a fall on 10/13 and was brought to hospital on 10/14 for further evaluation. On 10/14, he underwent left hip hemiarthroplasty for displaced femoral neck fracture. He was then seen by surgery on 10/19 for severe oropharyngeal dysphagia. They are willing to attempt Corpak placement and if unsuccessful will consider PEG. Neurology was consulted for dysphagia, tremor and concerns for parkinsonism . At baseline, patient was moderately active and could swallow appropriately and was funtionally independent as per son. He has had resting tremors and mild slowing of movements noted over the years as per son but not disrupting daily functioning.     Patient is appropriately following commands and responding today on exam. Problems with dysphagia have increased in post op period. Pt to be taken for EGD procedure today. Review of Systems:  Constitutional  Weight loss: no  Fever: no    Eyes  Double Vision: no  Visions loss: no    Ears, Nose, Mouth, and Throat  Difficulty swallowing: yes - since post op period     Cardiovascular  Chest Pain: no    Respiratory  Shortness of Breath: no    Gastrointestinal  Abdominal Pain: no    Genitourinary  Difficulty with Urination: no    Integumentary  Rash: no    Musculoskeletal  Back Pain: no    Neurological  Headaches: no  Weakness: no  Numbness: no  Seizures: no  Difficulty with Memory: no    Psychiatric  Anxiety: no  Depression: no  Other mental health issues: no      Medical History:   Past Medical History:   Diagnosis Date    CAD (coronary artery disease)     Cerebral artery occlusion with cerebral infarction ([facility] [address]     Hemorrhage     right eye    Raynaud disease     Tremors of nervous system         Surgical History:   Past Surgical History:   Procedure Laterality Date    CORONARY ANGIOPLASTY WITH STENT PLACEMENT      HIP SURGERY Left 10/14/2021    HIP HEMIARTHROPLASTY performed by Elizabeth Barragan MD at [facility] 26 N/A 2019    EGD BIOPSY performed by Mauro Jacobsen MD at [facility] 23        Family History:   No family history on file.     -    Social History:  Social History     Tobacco Use    Smoking status: Former Smoker     Types: Cigarettes     Quit date: 1976     Years since quittin.8    Smokeless tobacco: Never Used   Vaping Use    Vaping Use: Never used   Substance Use Topics    Alcohol use: Not Currently     Comment:      Drug use: No        Current Medications:      Current Facility-Administered Medications   Medication Dose Route Frequency Provider Last Rate Last Admin    dextrose 10 % infusion   IntraVENous Continuous Lilo Padron MD 50 mL/hr at 10/19/21 1242 New Bag at 10/19/21 1242    0.9 % sodium chloride infusion IntraVENous PRN Precious Cline, APRN - TRAVIS        barium sulfate 40 % reconsitutable suspension  15 mL Oral ONCE PRN Grady Nair MD        piperacillin-tazobactam (ZOSYN) 3,375 mg in dextrose 5 % 100 mL IVPB extended infusion (mini-bag)  3,375 mg IntraVENous Q8H Autumn Liz MD   Stopped at 10/20/21 0915    0.9 % sodium chloride infusion admixture  25 mL IntraVENous Q8H Autumn Liz MD   Stopped at 10/20/21 0913    oxyCODONE (ROXICODONE) immediate release tablet 5 mg  5 mg Oral Q4H PRN Amanda Gonzalez MD        Or    oxyCODONE (ROXICODONE) immediate release tablet 2.5 mg  2.5 mg Oral Q4H PRN Amanda Gonzalez MD        morphine (PF) injection 2 mg  2 mg IntraVENous Q3H PRN Amanda Gonzalez MD   2 mg at 10/19/21 2046    Or    morphine (PF) injection 1 mg  1 mg IntraVENous Q3H PRN Amanda Gonzalez MD        sodium chloride (OCEAN, BABY AYR) 0.65 % nasal spray 1 spray  1 spray Each Nostril PRN Amanda Gonzalez MD        GenTeal Tears 0.1-0.2-0.3 % SOLN 1 drop  1 drop Both Eyes PRN Amanda Gonzalez MD        Vitamin D (CHOLECALCIFEROL) tablet 2,000 Units  2,000 Units Oral Daily Levy Lies, DO   2,000 Units at 10/15/21 0851    tranexamic acid (CYKLOKAPRON) irrigation 1,000 mg  1,000 mg Irrigation Once Levy Lies, DO        sodium chloride flush 0.9 % injection 5-40 mL  5-40 mL IntraVENous 2 times per day Levy Lies, DO   10 mL at 10/19/21 2103    sodium chloride flush 0.9 % injection 5-40 mL  5-40 mL IntraVENous PRN Levy Lies, DO   10 mL at 10/15/21 0446    0.9 % sodium chloride infusion  25 mL IntraVENous PRN Levy Lies, DO        sennosides-docusate sodium (SENOKOT-S) 8.6-50 MG tablet 1 tablet  1 tablet Oral BID Levy Lies, DO   1 tablet at 10/15/21 2224    acetaminophen (TYLENOL) tablet 650 mg  650 mg Oral Q6H Levy Lies, DO   650 mg at 10/15/21 2224    promethazine (PHENERGAN) tablet 12.5 mg  12.5 mg Oral Q6H PRN Levy Lies, DO        Or    ondansetron [facility]) injection 4 mg  4 mg IntraVENous Q6H PRN [facility], DO        0.9 % sodium chloride infusion   IntraVENous PRN Ruchi Luna MD        sodium chloride flush 0.9 % injection 10 mL  10 mL IntraVENous Once [facility], DO        donepezil (ARICEPT) tablet 10 mg  10 mg Oral Nightly [facility], DO   10 mg at 10/15/21 2222    [Held by provider] enalapril (VASOTEC) tablet 2.5 mg  2.5 mg Oral Daily Refugia Bare, APRN - CNP        pantoprazole (PROTONIX) tablet 40 mg  40 mg Oral Daily with breakfast [facility], DO   40 mg at 10/15/21 0846    pravastatin (PRAVACHOL) tablet 20 mg  20 mg Oral Daily [facility], DO   20 mg at 10/15/21 0846    primidone (MYSOLINE) tablet 50 mg  50 mg Oral BID [facility], DO   50 mg at 10/15/21 1801    sertraline (ZOLOFT) tablet 25 mg  25 mg Oral Daily [facility], DO   25 mg at 10/15/21 0846    sodium chloride flush 0.9 % injection 5-40 mL  5-40 mL IntraVENous 2 times per day [facility], DO   10 mL at 10/19/21 2103    sodium chloride flush 0.9 % injection 5-40 mL  5-40 mL IntraVENous PRN [facility], DO        0.9 % sodium chloride infusion  25 mL IntraVENous PRN [facility], DO        enoxaparin (LOVENOX) injection 40 mg  40 mg SubCUTAneous Daily [facility], DO   40 mg at 10/19/21 1152    magnesium hydroxide (MILK OF MAGNESIA) 400 MG/5ML suspension 30 mL  30 mL Oral Daily PRN [facility], DO            Allergies:       Allergies   Allergen Reactions    Ativan [Lorazepam] Other (See Comments)     Per family- makes pt more agitated and anxious    Clindamycin/Lincomycin     Pcn [Penicillins]         Physical Examination  Vitals   Vitals:    10/19/21 1738 10/19/21 2000 10/20/21 0030 10/20/21 0800   BP: (!) 154/70 (!) 153/58 (!) 148/68 (!) 149/64   Pulse: 83 119 101 88   Resp: 16 16 16 16   Temp: 96.7 °F (35.9 °C) 98.2 °F (36.8 °C) 97.5 °F (36.4 °C) 97.7 °F (36.5 °C)   TempSrc: Temporal Temporal Temporal Temporal   SpO2: 93%   90%   Weight:       Height:            General: Patient appears in no acute distress  HEENT: Normocephalic, atraumatic  Chest: Clear to auscultation bilaterally  Heart: Regular rate and rhythm, no murmurs appreciated  Extremities: No edema or cyanosis noted    Neurologic Examination    Mental Status  Alert, and oriented to person, place and time with normal speech and language. No evidence of aphasia during conversation. Cranial Nerves  II. Visual fields full to confrontation bilaterally. Fundoscopic exam: Discs sharp bilaterally  III, IV, VI: Pupils equally round and reactive to light, 3 to 2 mm bilaterally. EOMs: full, no nystagmus. V. Facial sensation intact to light touch bilaterally  VII: Facial movements symmetric and strong  VIII: Hearing intact to voice  IX,X: Palate elevates symmetrically. No dysarthria  XI: Sternocleidomastoid and trapezius 5/5 bilaterally   XII: Tongue is midline    Motor     Right Left   Right Left   Deltoid 5 5  Hip Flexion 5 5   Biceps      5  5  Knee Extension 5 5   Triceps 5 5  Knee Flexion 5 5   Handgrip 5 5  Ankle Dorsiflexion 5 5       Ankle Plantarflexion 5 5     Tone: Normal in all four limbs    Bulk: Normal in all four limbs with mild atrophy likely due to aging     Bilateral  tremor in hands,forearms and neck noted with does not resolve on intention/action. Sensation  Light Touch: Intact distally in all four limbs  Pinprick: Intact distally in all four limbs  Vibration: Intact distally in all four limbs  Proprioception: Intact distally in all four limbs    Reflexes     Right Left   Biceps 2 2   Brachioradialis 2 2   Triceps 2 2   Patellar 2 2   Achilles 2 2   ankle clonus none none     Toes down going bilaterally.     Coordination  Finger nose test - intact     Gait  Cannot assess as patient immobilized 2/2 surgery       Labs  BMP WNL   Hb 8, Plt 145    Imaging  Barium Swallow   Impression   Aspiration with nectar consistency and pudding consistency oral contrast..       Please see separate speech pathology report for full discussion of findings   and recommendations. Other Testing Results  -        Electronically signed by: Connie Barros MD, 10/20/2021 9:27 AM    I have reviewed the chief complaint and history of present illness and review of symptoms as well as the past medical/social/family history sections for this patient. I have examined this patient, and participated in the care of this patient. I have reviewed the pertinent clinical information including physical exam, labs, radiographic studies and the plan of care. This patient was seen in coordination and conjunction with the Internal Medicine Resident. I have modified the note as necessary to accurately reflect my findings and recommendations.

## 2021-10-20 NOTE — CARE COORDINATION
Left message for liaison from [address] [place] [address]. Informed her that the pt will be getting a PEG tube today. She will check to see if they can accept the pt. For OR today. Will follow. Maryuri Parsons [phone]

## 2021-10-20 NOTE — OP NOTE
Operative Note      Patient: Reginaldo Vu  YOB: 1925  MRN: [id number]    Date of Procedure: 10/20/2021    Pre-Op Diagnosis: dysphagia    Post-Op Diagnosis: Same       Procedure(s):  EGD ESOPHAGOGASTRODUODENOSCOPY PEG TUBE INSERTION    Surgeon(s):  Karla Kebede MD    Assistant:   Resident: Prateek Monae DO    Anesthesia: Monitor Anesthesia Care    Estimated Blood Loss (mL): Minimal    Complications: None    Specimens:   * No specimens in log *    Implants:  * No implants in log *      Drains:   Gastrostomy/Enterostomy/Jejunostomy Percutaneous Endoscopic Gastrostomy (PEG) RUQ 20 fr (Active)       Urethral Catheter Coude 16 fr (Active)   Catheter Indications Urinary retention (acute or chronic), continuous bladder irrigation or bladder outlet obstruction;Perioperative use for selected surgical procedures 10/20/21 0800   Site Assessment No urethral drainage 10/20/21 0800   Urine Color Jerri;Pink 10/20/21 0800   Urine Appearance Clear 10/20/21 0800   Output (mL) 1100 mL 10/20/21 9618       Findings: as expected, antral ulcers    Detailed Description of Procedure: The patient was placed on the table and sedated via [facility]. The throat was numbed with Cetacaine spray. Bite block was placed. A lubricated scope was easily passed into the upper esophagus which looked normal. The upper esophageal sphincter was tight to pass scope through. The distal esophagus looked normal. The scope was passed into the stomach and retroflexed. There was a small hiatal hernia. The scope was passed down toward the pylorus. The antral mucosa all looked abnormal: with ulcers. A spot was found on the anterior stomach where the light shown through the  Abdominal wall and a finger could be seen pushing in. The skin was numbed with lidocaine, a 5 mm incision was made, the needle catheter was placed through the incision directly into the stomach without problems.  The string was passed through the catheter into the stomach and was grasped with the snare and brought out through the mouth. The PEG tube was attached to the string and pulled down through the mouth and out through the abdominal wall with the inner mushroom against the gastric mucosa. The outer plastic bolster was attached to the tube with a dry tube gauze on the skin. An abdominal binder was placed to protect the tube from the patient touching it. The patient tolerated the procedure well. The tube may be used slowly for the first 24 hours.      Dr. Jack Clemente was present for entire procedure      Electronically signed by Luisa Vega DO on 10/20/2021 at 5:12 PM

## 2021-10-20 NOTE — CONSULTS
Comprehensive Nutrition Assessment    Type and Reason for Visit:  Initial, Consult (TF ordering/management)    Nutrition Recommendations/Plan: Continue NPO, Start Tube Feeding when TF access obtained  **pt may be at risk for refeeding syndrome, monitor electrolytes/replace prn     TF rec: Standard w/ Fiber @ 40 ml/hr + 1 protein modular daily  Flush 145 ml water Q4 hrs  Will provide 960 ml tv, 1440 kcals, 61 gm pro, 730 ml free water, 1600 ml total fluids (1544 kcals, 87 gm pro w/ daily protein modular)  Regimen will meet 100% estimated nutrient needs    Nutrition Assessment:  Pt admit s/p fall w/ L hip fx, s/p L hip hemiarthroplasty. Noted PNA, dysphagia s/p failed MBS w/ noted multiple failed corpak placement attempts, pending OR for TF placement. Hx Dementia, CVA. Will provide TF rec and continue to monitor. Malnutrition Assessment:  Malnutrition Status:   At risk for malnutrition (Comment)    Context:  Acute Illness     Findings of the 6 clinical characteristics of malnutrition:  Energy Intake:  7 - 50% or less of estimated energy requirements for 5 or more days  Weight Loss:  Unable to assess (no wt hx on file)     Body Fat Loss:  Unable to assess     Muscle Mass Loss:  Unable to assess    Fluid Accumulation:  No significant fluid accumulation     Strength:  Not Performed    Estimated Daily Nutrient Needs:  Energy (kcal):  MSJ 1278 x 1.2 SF = 6419-1536; Weight Used for Energy Requirements:  Current     Protein (g):  80-90; Weight Used for Protein Requirements:  Current (1.2-1.4)        Fluid (ml/day):  7308-3144; Method Used for Fluid Requirements:  1 ml/kcal      Nutrition Related Findings:  disoriented x3, active BS, soft abd, trace edema, -I/Os, dysphagia s/p failed MBS      Wounds:  Surgical Incision       Current Nutrition Therapies:    Diet NPO    Anthropometric Measures:  · Height: 5' 9\" (175.3 cm)  · Current Body Weight: 145 lb (65.8 kg) (10/13 actual)   · Usual Body Weight:  (UTO, no wt hx on file)     · Ideal Body Weight: 160 lbs; % Ideal Body Weight 90.6 %   · BMI: 21.4  · BMI Categories: Underweight (BMI less than 22) age over 72       Nutrition Diagnosis:   · Inadequate oral intake related to cognitive or neurological impairment as evidenced by NPO or clear liquid status due to medical condition, swallow study results    Nutrition Interventions:   Food and/or Nutrient Delivery:  Continue NPO, Start Tube Feeding (When TF access obtained, recommend: Standard w/ Fiber @ 40 ml/hr + 1 protein modular daily)  Nutrition Education/Counseling:  Education not appropriate   Coordination of Nutrition Care:  Continue to monitor while inpatient    Goals:  nutrition progression/tolerance to EN       Nutrition Monitoring and Evaluation:   Food/Nutrient Intake Outcomes:  Enteral Nutrition Intake/Tolerance  Physical Signs/Symptoms Outcomes:  Biochemical Data, Chewing or Swallowing, GI Status, Fluid Status or Edema, Nutrition Focused Physical Findings, Skin, Weight     Discharge Planning:     Too soon to determine     Electronically signed by Carollee Kehr, MS, RD, LD on 10/20/21 at 12:35 PM EDT    Contact: [phone]

## 2021-10-20 NOTE — ANESTHESIA POSTPROCEDURE EVALUATION
Department of Anesthesiology  Postprocedure Note    Patient: Steven Feng  MRN: [id number]  YOB: 1925  Date of evaluation: 10/20/2021  Time:  5:38 PM     Procedure Summary     Date: 10/20/21 Room / Location: [place] / [facility]    Anesthesia Start: 4329 Anesthesia Stop: 3235    Procedure: EGD ESOPHAGOGASTRODUODENOSCOPY PEG TUBE INSERTION (N/A ) Diagnosis: (dysphagia)    Surgeons: Juan Jose Yun MD Responsible Provider: Gabby Zurita DO    Anesthesia Type: MAC ASA Status: 4          Anesthesia Type: MAC    Ramos Phase I: Ramos Score: 8    Ramos Phase II:      Last vitals: Reviewed and per EMR flowsheets.        Anesthesia Post Evaluation    Patient location during evaluation: bedside  Patient participation: complete - patient cannot participate  Level of consciousness: awake and alert  Airway patency: patent  Nausea & Vomiting: no nausea and no vomiting  Complications: no  Cardiovascular status: blood pressure returned to baseline  Respiratory status: acceptable  Hydration status: euvolemic

## 2021-10-20 NOTE — PROGRESS NOTES
[phone] with Dr. Donta Ruiz at bedside about patient's SpO2. This morning was 85% on 3L nasal cannula. Potentially related to mouth breathing Now currently 99% on 2L nasal cannula. Patient is sitting upright in chair and resting comfortably. Does not appear to have any labored breathing.     (1200) Talked with Dr. Donta Ruiz over the phone about current SpO2 levels.

## 2021-10-20 NOTE — PROGRESS NOTES
Physical Therapy  Facility/Department: [address] PICU  Daily Treatment Note  NAME: Tiara Malone  : 1925  MRN: [id number]    Date of Service: 10/20/2021    Evaluating PT:  Vanna Romero PT, DPT  [id number]        Room #:  2880/2778-V  Diagnosis:  Closed left hip fracture, initial encounter [facility]) [S72.002A]  PMHx/PSHx:  CAD, R eye hemorrhage, tremors, raynaud disease, cerebral artery occlusion with infarct  Procedure/Surgery:  L hip hemiarthroplasty 10/14/21  Precautions:  Falls, L hip precautions, L hip WBAT, cognition, TSM, bed alarm, [place]  Equipment Needs:  TBD     SUBJECTIVE:     Pt lives with his daugther in a cape [facility] style home with 3 stairs to enter and B rails. Pt has permanent first floor set up with bed and bathroom on first floor. Pt ambulated with no AD indoors and FWW outdoors at baseline. Pt has  supervision/assistance of family vs caregivers. DME owned: rollator, FWW, canes, w/c     OBJECTIVE:    Initial Evaluation  Date: 10/15/21 Treatment  Date: 10/20/2021 Short Term/ Long Term   Goals   AM-PAC 6 Clicks      Was pt agreeable to Eval/treatment? yes yes     Does pt have pain? No c/o pain No c/o pain     Bed Mobility  Rolling: NT  Supine to sit: MaxAx2  Sit to supine: NT  Scooting:  Mod A to EOB Rolling: MaxA  Supine to sit: N/T  Sit to supine: MaxA x2  Scooting: MaxA to EOB Rolling: Supervision   Supine to sit: Supervision   Sit to supine: Supervision   Scooting: Supervision    Transfers Sit to stand: ModA  Stand to sit: ModA  Stand pivot: ModA with FWW Sit to stand: MaxA x2  Stand to sit: MaxA   Stand pivot: MaxA +2 with FWW Sit to stand: Supervision   Stand to sit: Supervision   Stand pivot: Supervision with FWW   Ambulation    5 feet forward/backward with FWW with ModA NT >50 feet with FWW with Supervision    Stair negotiation: ascended and descended  NT NT 3 steps with B rails Supervision    ROM BUE:  WNL  BLE:  WNL       Strength BUE:  WNL  BLE:  Mild functional weakness, unable to follow cues for accurate MMT BLE:  Mild functional weakness, unable to follow cues for accurate MMT     Balance Sitting EOB:  ModA>CGA  Dynamic Standing:  ModA with FWW Sitting EOB:  CGA  Dynamic standing: MaxA x2 with FWW Sitting EOB:  Supervision   Dynamic Standing:  Supervision with FWW      Pt is A & O x 1 self only  Sensation:  Pt denies numbness and tingling to extremities  Edema:  unremarkable      Vitals: Therapeutic Exercises:    Transfer training    Patient education  Pt educated on role of therapy, safety with mobility, sequencing with bed mobility and transfers    Patient response to education:   Pt verbalized understanding Pt demonstrated skill Pt requires further education in this area   yes yes yes     ASSESSMENT:    Comments:  Pt sitting in chair and requested to return to bed. Pt required increased assistance for transfers and stand pivot transfer. Cues to improve flexed trunk and flexed knees. Pt repositioned in bed. Pt given call light. Treatment:  Patient practiced and was instructed in the following treatment:     Bed mobility: cues for sequencing, manual assist for B LE and trunk negotiation    Transfer training: cues for hand placement, manual assist for lift/lower, manual assist for balance and walker progression for  Chair to bed.      PLAN:    Patient is making continued progress towards established goals. Will continue with current POC.         PLAN:    Time in  13:24  Time out  13:34    Total Treatment Time  10 min    CPT codes:  [] Gait training 79641 0 minutes  [] Manual therapy 48618 0 minutes  [x] Therapeutic activities 04279 10 minutes  [] Therapeutic exercises 26776 0 minutes  [] Neuromuscular reeducation 66162 0 minutes      Sonja Keith [id number]

## 2021-10-20 NOTE — CARE COORDINATION
Met with pt's daughter at the bedside. Discussed transition. BLAKE Foster will be bonita to accept the pt with a PEG tube. Will need covid test and insurance precert. Will follow.  Manjit Monroy [phone]

## 2021-10-21 NOTE — PROGRESS NOTES
Called to nurse to nurse report to [facility]. No further questions at this time.

## 2021-10-21 NOTE — PROGRESS NOTES
GENERAL SURGERY  DAILY PROGRESS NOTE  10/21/2021    CHIEF COMPLAINT:  Chief Complaint   Patient presents with    Fall     transfer from [facility] left hip frx. SUBJECTIVE:  Patient had PEG placed yesterday, no issues starting tube feeds overnight. This AM, patient appears comfortable. OBJECTIVE:  BP (!) 141/61   Pulse 74   Temp 97.1 °F (36.2 °C) (Temporal)   Resp 16   Ht 5' 9\" (1.753 m)   Wt 145 lb 1 oz (65.8 kg)   SpO2 99%   BMI 21.42 kg/m²     GENERAL:  NAD. A&Ox3. LUNGS:  No increased work of breathing. CARDIOVASCULAR: RR  ABDOMEN:  Soft, non-distended, non-tender. No guarding, rigidity, rebound. PEG 3 cm @ skin. ASSESSMENT/PLAN:  80 y.o. male s/p PEG placement for dysphagia.      - PEG tube okay to continue use for meds and feeds  - feeds per dietary recs  - please do not hesitate to call surgery if there are any further questions or concerns    Hang Kim MD  Surgery Resident PGY-2  10/21/2021  11:59 AM

## 2021-10-21 NOTE — PROGRESS NOTES
correct breathing pattern and techniques to improve independence/tolerance for self-care routine  * Functional transfer/mobility training/DME recommendations for increased independence, safety, and fall prevention  * Patient/Family education to increase follow through with safety techniques and functional independence  * Recommendation of environmental modifications for increased safety with functional transfers/mobility and ADLs  * Cognitive retraining/development of therapeutic activities to improve problem solving, judgement, memory, and attention for increased safety/participation in ADL/IADL tasks  * Therapeutic exercise to improve motor endurance, ROM, and functional strength for ADLs/functional transfers  * Therapeutic activities to facilitate/challenge dynamic balance, stand tolerance for increased safety and independence with ADLs  * Therapeutic activities to facilitate gross/fine motor skills for increased independence with ADLs  * Neuro-muscular re-education: facilitation of righting/equilibrium reactions, midline orientation, scapular stability/mobility, normalization of muscle tone, and facilitation of volitional active controled movement  * Positioning to improve skin integrity, interaction with environment and functional independence  * Delirium prevention/treatment  Other:     Recommended Adaptive Equipment: TBD as pt progresses      Home Living:  Pt lives in his home with his Dtr - 2-story house, Bed/bath on the 2nd floor.  (+) Basement. Bathroom setup:  Tub-Shower w/ Grab bar and Shower chair, High Commode w/ Grab bar  Equipment owned:  [facility], [employer]      Available Family Assist:  Dtr works ~ 10-12 hours/day - Pt has Aides for ~ 12 hours when Dtr is at Work. 24/7 SUP     Prior Level of Function:  Per Dtr, pt was IND with Dressing/Toileting, Transfers and Mobility using No AD for household ambulation. SUP for safety w/ bathing. Pt reported pt \"usually forgets to use his Cane. \"   Driving: No  Occupation:  Not Reported;  [employer]     Pain Level:  Pt c/o LLE pain    Additional Complaints: Pt fatigue; closing eyes throughout treatment.       Cognition: A & O x 1; self  Able to Follow Multi-Step Commands w/ Max VCs              Memory:  poor              Sequencing:  poor              Problem solving:  poor              Judgement/safety:  poor                Functional Assessment:  AM-PAC Daily Activity Raw Score: 11/24       Initial Eval Status  Date: 10/15/21    Treatment Status  Date: 10/21/21 STGs = LTGs  Time frame: 10-14 days   Feeding Min A/Set up     Intention Tremor, varied in severity during task, initially required mod A to grasp container w/ L Hand and Mod A to grasp and manipulate Utensil w/ R hand, able to bring utensil to mouth, Improved to requiring Min A for steadying of tremors R UE only -able to feed self - uncertain if pt has Weighted utensils at home     per last tx SUP/Set up   Grooming Min A/Set up     Min A for quality of task to wash hands/face/comb hair in semi-supine and seated in chair, Min VCs   Unsafe to attempt to ambulate to or stand at sink    SBA  Pt washed face, applied deodorant, combed hair seated upright in chair   SUP/set up  Seated/Standing at the Sink   UB Dressing Mod A/Set up     Mod A, Mod VCs to don robe seated in chair  Unsafe to attempt item retrieval for activities    Kandi  [facility] gown seated upright in chair    SUP/Set up      LB Dressing Dep     Max A of 1  to don socks, thread LEs into pants, pull pants over hips + Mod A of 1 for safety w/ dynamic standing balance (pants simulated) w/ use of [employer]  Pt ed for safe/adaptive techs    Dependent  [facility] socks     Mod A      Bathing NT       maxA  Simulated Task    Pt able to wash of BUE, requiring assistance to wash of LB and stand to wash of buttocks/feliberto area   Mod A       Toileting NT     Maxwell Catheter  Requested BSC for use in room     Dependent  Pt having of maxwell catheter Mod A Bed Mobility  Supine to sit: Max A of 2  Sit to supine:  NT      Mod VCs for safety, adherence to hip precautions, initially required Max A for upright position at EOB, gradually improved to Close SUP seated EOB w/ VCs and Tactile cues, placement of R UE to steady self    maxA  Supine<>EOB  EOB<>Supine   Supine to sit: Min A  Sit to supine: Min A      Functional Transfers Mod A     EOB 1x, Chair 2x  Pt ed for safety/hand placement    maxA  Sit to Stand  Stand to Sit    Cueing for hand placement    Stand Pivot Transfer EOB<>Chair wih w.w., second person assist to manage of lines/tubes   Min A      Functional Mobility Mod A w/ WW     B/t surfaces, short distance at b/s - seated rest break b/t trials  Pt ed for safety/improved safety awareness, walker safety     maxAx2  Pt took of short steps during SPT with w.w, slow pace, max cueing, second person assist to manage of lines/tubes   Min A      Balance Sitting:     Static:  Max A -> Close SUP unsupported at EOB, SUP for safety in armed chair    Dynamic:  Close SUP w/ functional ax EOB, chair      Standing:     Static:  Mod A w/ [employer]    Dynamic:  Mod A w/ WW for functional ax/mobility      Sitting EOB:  Kandi    Standing:  maxA Sitting:     Static:  Remote SUP    Dynamic:  SUP w/ functional ax       Standing:     Static:  SUP w/ [employer]    Dynamic:  Min A w/ functional ax/mobility w/ [employer]   Activity Tolerance Fair     Limited by general weakness, fatigue, pain, tremors, cognition    Poor+    Fair(+)   Visual/  Perceptual     Hearing: WNL   Glasses: None at b/s     [facility]  Hearing Aids:  Per Dtr, has hearing aids but does not wear them                          Treatment/Education/Comments: Upon arrival, pt supine in bed, agreeable to therapy, son present, speaking with nursing okaying pt to be seen this session. Pt completed of bed mobility, functional mobility of short steps, transfers, light ADL tasks and UB exercises this session.  Pt educated on importance of therapy and OOB activity, precautions to follow, safety with bed mobility, safety and hand placement with transfers, safety and walker management with short steps, and UB exercises, compelting of multiple reps in multiple planes, focusing on increasing of ROM and prevention of contractures. At end of session, pt seated upright in chair, all tubes and lines intact, call light within reach. PT present during session due to pt's current assist levels, activity tolerance, and for safety.            Time In: 9:05am  Time Out: 9:30am  Total Treatment Time: 25 minutes    Min Units   OT Eval Low 80087     OT Eval Medium 64908     OT Eval High 94176     OT Re-Eval 80266     Therapeutic Ex 06922     Therapeutic Activities 07121 17 1   ADL/Self Care 93344 8 1   Orthotic Management 90115       Manual 39063     Neuro Re-Ed 93874       Non-Billable Time          Dinah Rivas ALEMAN/L [id number]

## 2021-10-21 NOTE — PROGRESS NOTES
Physical Therapy  Facility/Department: [address] PICU  Daily Treatment Note  NAME: Sherryle Massy  : 1925  MRN: [id number]    Date of Service: 10/21/2021    Evaluating PT:  Michael Bruno PT, DPT  [id number]        Room #:  1677/9777-Y  Diagnosis:  Closed left hip fracture, initial encounter [facility]) [S72.002A]  PMHx/PSHx:  CAD, R eye hemorrhage, tremors, raynaud disease, cerebral artery occlusion with infarct  Procedure/Surgery:  L hip hemiarthroplasty 10/14/21  Precautions:  Falls, L hip precautions, L hip WBAT, cognition, TSM, bed alarm, [place]  Equipment Needs:  TBD     SUBJECTIVE:     Pt lives with his daugther in a [place] [facility] [place] home with 3 stairs to enter and B rails. Pt has permanent first floor set up with bed and bathroom on first floor. Pt ambulated with no AD indoors and FWW outdoors at baseline. Pt has  supervision/assistance of family vs caregivers. DME owned: rollator, FWW, canes, w/c     OBJECTIVE:    Initial Evaluation  Date: 10/15/21 Treatment  Date: 10/21/2021 Short Term/ Long Term   Goals   AM-PAC 6 Clicks      Was pt agreeable to Eval/treatment? yes yes     Does pt have pain? No c/o pain No c/o pain     Bed Mobility  Rolling: NT  Supine to sit: MaxAx2  Sit to supine: NT  Scooting:  Mod A to EOB Rolling: MaxA  Supine to sit:MaxA  Sit to supine: N/T  Scooting: MaxA to EOB Rolling: Supervision   Supine to sit: Supervision   Sit to supine: Supervision   Scooting: Supervision    Transfers Sit to stand: ModA  Stand to sit: ModA  Stand pivot: ModA with FWW Sit to stand: MaxA   Stand to sit: MaxA   Stand pivot: MaxA +2 with FWW Sit to stand: Supervision   Stand to sit: Supervision   Stand pivot: Supervision with FWW   Ambulation    5 feet forward/backward with FWW with ModA 10 feet x1 with ww MaxA and extra person for IV and oxygen >50 feet with FWW with Supervision    Stair negotiation: ascended and descended  NT NT 3 steps with B rails Supervision    ROM BUE:  WNL  BLE:  WNL       Strength activities 92525 25 minutes  [] Therapeutic exercises 05419 0 minutes  [] Neuromuscular reeducation 84473 0 minutes      Ricardo Lambert [id number]

## 2021-10-21 NOTE — DISCHARGE SUMMARY
Physician Discharge Summary     Patient ID:  Stu Wilkes  [id number]  35 y.o.  8/7/1925    Admit date: 10/13/2021    Discharge date and time:  10/21/2021     Admission Diagnoses:   Chief Complaint   Patient presents with    Fall     transfer from [facility] left hip frx. Closed left hip fracture, initial encounter [facility])     Discharge Diagnoses:   Principal Problem:    Closed left hip fracture, initial encounter ([facility]  Active Problems:    Pneumonia    CAD (coronary artery disease)    H/O: CVA (cerebrovascular accident)    Essential hypertension    Dementia (HCC)    Thrombocytopenia (HCC)    Anemia    Postoperative anemia due to acute blood loss    MARIETTA (acute kidney injury) ([facility]  Resolved Problems:    * No resolved hospital problems. *       Consults: Ortho, urology, surgery    Procedures: L hip hemiarthroplasty    Hospital Course:   Patient experienced a mechanical fall breaking his left hip. He went for left hemiarthroplasty which was uncomplicated. Postoperatively he failed multiple successive swallow evaluations. I noted that he has resting tremor of bilateral hands as well as a resting tremor of the face and masked facies. His family was very fixated on the fact that nearly 2 decades ago a doctor had told him he does not have Parkinson's disease so they had sort of ruled that out as a possibility in their minds. I spoke to multiple family members on multiple occasions as they were initially refusing urology consult, despite his swallowing difficulties. Ultimately his family did allow neurology to see him, and neurology suggested starting Sinemet. Because of repeated failed swallow evaluations, I requested NG tube to be placed. However multiple attempts of doing this were unsuccessful. Surgery was consulted and they had initially offered to place an endoscopic NG tube, but after further discussion the family opted simply to have a PEG placed.  That said, I would recommend continued aggressive work with SLP to get his swallowing better. I would also recommend outpatient follow-up with neurology to monitor and see if the Sinemet has any significant effect. I also noted that he has some mild stable cytopenias including mild leukopenia and mild from cytopenia. I wonder if he may have a low-grade of MDS. I have placed a hematology consult for outpatient. Since he has a new start on tube feeding, he will need twice weekly labs for further adjustment and monitoring of the tube feeds. Please see the Lety Mcbride section of discharge paperwork for specific details. Discharge Exam:  Vitals:    10/20/21 1755 10/20/21 2130 10/21/21 0000 10/21/21 0715   BP: (!) 155/78  137/63 (!) 141/61   Pulse: 83  78 74   Resp: 16  16 16   Temp: 97.4 °F (36.3 °C)  96.9 °F (36.1 °C) 97.1 °F (36.2 °C)   TempSrc: Temporal  Temporal Temporal   SpO2: 100% 98% 95% 99%   Weight:       Height:            General appearance: NAD, conversant  HEENT: AT/NC, MMM  Neck: FROM, supple  Lungs: Clear to auscultation, WOB normal  CV: RRR, no MRGs  Abdomen: Soft, non-tender; no masses or HSM, +BS. Abd binder in place  Extremities: No peripheral edema or digital cyanosis  Skin: no rash, lesions or ulcers  Psych: Calm and cooperative  Neuro: Alert and interactive, nonfocal.  Resting head tremor - mild. Hand tremors less prominent today. Condition:  Stable    Disposition: SNF    Patient Instructions:   Current Discharge Medication List      START taking these medications    Details   carbidopa-levodopa (SINEMET)  MG per tablet Take 1 tablet by mouth 3 times daily  Qty: 90 tablet, Refills: 3      apixaban (ELIQUIS) 2.5 MG TABS tablet Take 1 tablet by mouth 2 times daily  Qty: 60 tablet, Refills: 1      oxyCODONE-acetaminophen (PERCOCET) 5-325 MG per tablet Take 1 tablet by mouth every 6 hours as needed for Pain for up to 7 days. Intended supply: 7 days.  Take lowest dose possible to manage pain  Qty: 28 tablet, Refills: 0    Comments: Reduce doses taken as

## 2021-10-21 NOTE — DISCHARGE INSTR - COC
([facility] E44.0    Delirium secondary to multiple medical problems F05    Postoperative anemia due to acute blood loss D62    Peptic ulcer disease with hemorrhage K27.4    Influenza B J10.1    Sepsis secondary to influenza B (HCC) A41.9    GI bleed K92.2    Multiple skin tears T14. 8XXA    Closed left hip fracture, initial encounter ([facility] S72.002A    MARIETTA (acute kidney injury) ([facility] N17.9       Isolation/Infection:   Isolation            No Isolation          Patient Infection Status       Infection Onset Added Last Indicated Last Indicated By Review Planned Expiration Resolved Resolved By    None active    Resolved    COVID-19 Rule Out 10/18/21 10/18/21 10/18/21 COVID-19, Rapid (Ordered)   10/18/21 Rule-Out Test Resulted    COVID-19 Rule Out 10/13/21 10/13/21 10/13/21 COVID-19, Rapid (Ordered)   10/13/21 Rule-Out Test Resulted    COVID-19 Rule Out 06/07/21 06/07/21 06/07/21 COVID-19, Rapid (Ordered)   06/07/21 Rule-Out Test Resulted            Nurse Assessment:  Last Vital Signs: BP (!) 141/61   Pulse 74   Temp 97.1 °F (36.2 °C) (Temporal)   Resp 16   Ht 5' 9\" (1.753 m)   Wt 145 lb 1 oz (65.8 kg)   SpO2 99%   BMI 21.42 kg/m²     Last documented pain score (0-10 scale): Pain Level: 0  Last Weight:   Wt Readings from Last 1 Encounters:   10/13/21 145 lb 1 oz (65.8 kg)     Mental Status:  oriented and alert    IV Access:  - None    Nursing Mobility/ADLs:  Walking   Assisted  Transfer  Assisted  Bathing  Assisted  Dressing  Assisted  Toileting  Assisted  Feeding  Assisted  Med Admin  Dependent  Med Delivery   crushed    Wound Care Documentation and Therapy:  Wound 09/03/21 Shoulder Left #1 (Active)   Number of days: 48       Wound 09/03/21 Arm Left; Lower #2 block (Active)   Number of days: 48       Wound 10/16/21 Radial Left (Active)   Dressing Status Clean;Dry; Intact 10/21/21 0928   Dressing/Treatment Other (comment) 10/16/21 1100   Wound Assessment Pink/red 10/21/21 0928   Drainage Amount None 10/21/21 2323 SECTION    Prognosis: Good    Condition at Discharge: Stable    Rehab Potential (if transferring to Rehab): Good    Recommended Labs or Other Treatments After Discharge:   CBC, BMP, mag, phos biweekly and adjust tube feeds as needed! He needs to see hematology on a non-urgent basis due to cytopenias. Please make sure he gets to [facility] (referral done)  Please make sure he also gets back to see Neurology for outpatient follow up. Voiding trial when more ambulatory, please! Refer back to [facility] if further bleeding. Frequent SLP follow up and exercises to see if he can liberate diet (currently NPO on TF's)    Physician Certification: I certify the above information and transfer of Ashish Rowan  is necessary for the continuing treatment of the diagnosis listed and that he requires [place] for less 30 days.      Update Admission H&P: No change in H&P    PHYSICIAN SIGNATURE:  Electronically signed by Uche Mclaughlin MD on 10/21/21 at 11:19 AM EDT

## 2021-10-21 NOTE — CARE COORDINATION
Spoke with the pt's son. He said that the family wants [facility] pt [facility] and that they have spoke to the building and [employer]. Phoned liaison. The pt's insurance company has already denied ARU. The family has chosen [address] Referral made. Pt accepted. Hens completed transportation arranged for 4:00 with [employer]. Daughter, facility, nursing aware of the time. covid test given to assigned nurse.  Cindi Laws -[phone]

## 2021-11-01 PROBLEM — Z96.642 HISTORY OF LEFT HIP HEMIARTHROPLASTY: Status: ACTIVE | Noted: 2021-01-01

## 2021-11-01 NOTE — PROGRESS NOTES
Tiara Malone is a 80 y.o. male who presents for follow up of left hip    SURGEON: Dr. Mckenna Tabor MD  Date of Injury/Surgery: 10/14/2021    Symptoms: better  New complaints: none    Cast/Splint, Brace, or Dressings: Clean, dry and intact and Taken down for visit     Incision: Edges approximated, staples intact, no drainage or redness noted. Weightbearing: left lower Weight bearing as tolerated      Assistive device: wheelchair and walker  Participating in therapy (location if yes)?  yes, at facility    Refills Needed: None  Order/Referral Needed: N/A

## 2021-11-01 NOTE — PATIENT INSTRUCTIONS
INSTRUCTIONS FOR FACILITY    Weight Bearing: Weight bearing as tolerated left lower extremity. Use assistive devices when needed. Therapy: Continue PT/OT for gait training, strengthening and fall prevention    Pain control: per facility physician- typically have patients off narcotic pain medication by 6 weeks post op    Incisional Care: sutures removed today in office. Steri strips placed. Steri strips can be removed in 7-10 days if they do not fall off sooner. Continue to monitor for signs or symptoms of infection until skin has completely healed. Recommend thin layer of Vaseline 1-2x daily over incision line to aid in healing. Okay to shower. No scrubbing near incision until skin has completley healed. Thoroughly pat dry with clean towel. DVT Prophylaxis: Patient chronically on Apixaban- continue as prescribed    Follow up:     Future Appointments   Date Time Provider Shayne Roldan   12/1/2021  1:00 PM Samantha Schmidt MD [facility]       Call with any questions or concerns.    [phone]

## 2021-11-01 NOTE — PROGRESS NOTES
Chief Complaint   Patient presents with    Post-Op Check     Left hip shyam 10/14/2021        OP:SURGEON: Dr. Etelvina Hadley MD  DATE OF PROCEDURE: 10/14/2021  PROCEDURE:Left hip hemiarthroplasty for displaced femoral neck fracture    POD: 2 weeks    Subjective:  Paul Clark is following up from the above surgery. He is WBAT on left lower extremity. Patient will stand and transfer. He ambulates with assistive device, walker, using wheelchair today in office. Pain to extremity is mild and is  taking prescribed pain medication, see scanned MAR. They denies numbness, tingling, weakness to the left lower extremity. Denies calf pain, chest pain, or shortness of breath. Patient continues to use DVT prophylaxis, patient chronically on eliquis. Patient is  participating in therapy, SNF. The patient's facility comprehensive medical history transfer sheets were evaluated, and their history, medications, allergies, treatments were updated in [facility] today. Review of Systems -  All pertinent positives/negative in HPI     Objective:    General: Alert and oriented X 3, normocephalic atraumatic, external ears and eye normal, sclera clear, no acute distress, respirations easy and unlabored with no audible wheezes, skin warm and dry, speech and dress appropriate for noted age, affect euthymic. Extremity:  Left Lower Extremity  Skin clean dry and intact, without signs of infection   Incision healing well, no significant drainage, no dehiscence, no significant erythema   Staples intact and ready for removal  mild edema noted to the lateral left thigh  Compartments supple throughout thigh and leg  Calf supple and not tender  negative Homans  Demonstrates active hip flexion, knee flexion/extension, ankle DF/PF, + EHL  No TTP to the lateral hip, no pain with log roll  States sensation intact to touch in sural, deep peroneal, superficial peroneal, saphenous, posterior tibial  nerve distributions to foot/ankle.   Palpable dorsalis pedis and posterior tibialis pulses, cap refill brisk in toes, foot warm/perfused. There were no vitals taken for this visit. XR:   AP pelvis and 2 views of the left hip demonstrates left hip hemiarthroplasty in anatomic alignment. There is no evidence of loosening or subsidence. Leg lengths appear equal. No acute osseous abnormality    Assessment:   Diagnosis Orders   1. History of left hip hemiarthroplasty         Plan:  INSTRUCTIONS FOR FACILITY    Weight Bearing: Weight bearing as tolerated left lower extremity. Use assistive devices when needed. Therapy: Continue PT/OT for gait training, strengthening and fall prevention    Pain control: per facility physician- typically have patients off narcotic pain medication by 6 weeks post op    Incisional Care: sutures removed today in office. Steri strips placed. Steri strips can be removed in 7-10 days if they do not fall off sooner. Continue to monitor for signs or symptoms of infection until skin has completely healed. Recommend thin layer of Vaseline 1-2x daily over incision line to aid in healing. Okay to shower. No scrubbing near incision until skin has completley healed. Thoroughly pat dry with clean towel. DVT Prophylaxis: Patient chronically on Apixaban- continue as prescribed    Follow up:     Future Appointments   Date Time Provider Shayne Roldan   12/1/2021  1:00 PM Dez Barahona  [facility] [address]     Electronically signed by Leah Melissa PA-C on 11/1/2021 at 1:55 PM  Note: This report was completed using [employer] voiced recognition software. Every effort has been made to ensure accuracy; however, inadvertent computerized transcription errors may be present.

## 2021-11-05 NOTE — PROGRESS NOTES
Speech Language Pathology      NAME:  Kianna Khan  :  1925  DATE: 2021  ROOM:  Room/bed info not found    Patient seen for swallow therapy 15 minutes. Reviewed current solid/liquid consistency diet recommendation for   Dysphagia 1, Pureed solids with  thin liquids (IDDSI level 0)and discussed compensatory strategies to ensure safe PO intake. Reviewed aspiration precautions. Discussed use of compensatory strategies and diet modification to decrease risk of aspiration with implementation of strengthening exercises to improve swallow function as the long term goal.  Patient was not consistently able to return demonstration of compensatory strategies during session. Patient will require ongoing education regarding dysphagia secondary to decreased ability to restate strategies during session.       Patient Active Problem List   Diagnosis    Sepsis ([address]    Pneumonia    CAD (coronary artery disease)    Hyperlipidemia    Dysphagia    Subdural hematoma ([address]    H/O: CVA (cerebrovascular accident)    Essential hypertension    Tremors of nervous system    Dementia ([address]    Thrombocytopenia ([address]    Anemia    Urinary retention    Drop attack    Protein-calorie malnutrition, moderate ([address]    Delirium secondary to multiple medical problems    Postoperative anemia due to acute blood loss    Peptic ulcer disease with hemorrhage    Influenza B    Sepsis secondary to influenza B ([address]    GI bleed    Multiple skin tears    Closed left hip fracture, initial encounter ([address]    MARIETTA (acute kidney injury) ([address]    History of left hip hemiarthroplasty       [id number]  dysphagia tx    Kelley Escobedo MSCCC/SLP  Speech Language Pathologist  [id number]

## 2021-11-05 NOTE — PROGRESS NOTES
compensatory swallow strategies to improve airway protection and swallow function  Treatment Goals:    Short Term Goals:  Pt will complete PO trials of upgraded diet textures with SLP only to determine the least restrictive PO diet to maintain adequate nutrition/hydration with no more than 1 overt s/s of pen/asp. Pt will complete BOTR strength/ ROM exercises to reduce pharyngeal residuals and improve epiglottic inversion minimal verbal prompts  Pt will complete laryngeal strength/ ROM therapeutic exercises to improve airway protection for the least restrictive PO diet minimal verbal prompts  Pt will complete Shaker and/or Chin Tuck Against Resistance (CTAR) to increase UES relaxation diameter and increase anterior laryngeal excursion to reduce pharyngeal residuals and reduce risk of pen/asp with minimal verbal prompts. Pt will complete Effortful Swallow therapeutically to target increased oral and base of tongue pressure, increased pharyngeal constrictor contractions, and increased UES relaxation duration to reduce pharyngeal residue with minimal verbal prompts   Pt will practice chin down posture to target earlier laryngeal closure with minimal verbal prompts     Long Term Goals:   Pt will improve oropharyngeal swallow function to ensure airway protection during PO intake to maintain adequate nutrition/hydration and decrease signs/symptoms of aspiration to less than 1 x/day.       Patient/family Goal:    To be able to [facility] discussed with Patient and Family   The Patient did not demonstrate complete understanding of the diagnosis, prognosis and plan of care and Family understand(s) the diagnosis, prognosis and plan of care     Rehabilitation Potential/Prognosis: fair                      ADMITTING DIAGNOSIS: Oropharyngeal dysphagia [R13.12]     VISIT DIAGNOSIS:         PATIENT REPORT/COMPLAINT: patient and family hope to resume PO intake     PRIOR LEVEL OF SWALLOW FUNCTION:    Past History of Dysphagia?:  Yes  MBSS 10/19/21 severe oropharyngeal phase dysphagia Penetration WITH aspiration was observed in today's study with  Nectar liquids and pureed foods. Diet recommended NPO. Home diet: NPO -- including medications. Nutrition, fluids and medication to be administered through current PEG tube. PROCEDURE:  Consistencies Administered During the Evaluation   Liquids: thin liquid, nectar thick liquid and honey thick liquid   Solids:  pureed foods      Method of Intake:   cup, spoon  Self fed, Fed by clinician      Position:   Seated, upright, Lateral plane    INSTRUMENTAL ASSESSMENT:    MBSImP Results:   Lip closure for intraoral bolus containment resulted in no labial escape. Tongue control during bolus hold resulted in posterior escape of less than 1/2 of the bolus. Bolus preparation and mastication solid not given son reports prior to peg had to have solids shopped up. Bolus transport/lingual motion was with slow repetitive tongue motion with lingual fasciculations present also. Oral residue was not observed. There was complete oral clearance. Initiation of the pharyngeal swallow occurred as the bolus head reached the pyriform sinuses. Soft palate elevation resulted in trace bolus between the soft palate and the pharyngeal wall. Laryngeal elevation demonstrated partial superior movement of the thyroid cartilage with partial approximation of the arytenoids to the epiglottic petiole. Anterior hyoid excursion demonstrated partial anterior movement. Epiglottic movement resulted in partial inversion. Laryngeal vestibule closure was incomplete, as indicated by a narrow column of air or contrast within the laryngeal vestibule at the height of the swallow. Pharyngeal stripping wave was present but diminished. Pharyngeal contraction could not be determined due to logistical reasons not related to physiologic impairment.      Pharyngoesophageal segment opening was incompletely distended for incomplete duration with partial obstruction of bolus flow--this did result in retrograde flow back into the pyriforms. Tongue base retraction allowed a collection of residue between the retracted tongue base and the posterior pharyngeal wall. A collection of residue remained within or on the pharyngeal structures Esophageal clearance in the upright position could not be assessed due to logistical reasons not related to physiologic impairment. PENETRATION-ASPIRATION SCALE (PAS):  THIN 2 = Material enters the airway, remains above vocal folds, and is ejected from the airway   MILDLY THICK 2 = Material enters the airway, remains above vocal folds, and is ejected from the airway   MODERATELY THICK 1 = Material does not enter the airway  PUREE 1 = Material does not enter the airway  HARD SOLID item not administered       COMPENSATORY STRATEGIES    Compensatory strategies that were beneficial included Double swallow, Effortful swallow, Chin neutral to slightly tucked, Small bites/sips, Alternate solids and liquids and Throat clear      STRUCTURAL/FUNCTIONAL ANOMALIES   No structural/functional anomalies were noted    CERVICAL ESOPHAGEAL STAGE :     Prominent cricopharyngeus           ___________    Cognition:   Within functional limits for this exam and Follows 1-2 - step directions appropriate for this assessment    Oral Peripheral Examination   Generalized oral weakness    Current Respiratory Status   room air     Parameters of Speech Production  Respiration:  Adequate for speech production  Quality:   Within functional limits  Intensity: Within functional limits    Pain: No pain reported. EDUCATION:   The Speech Language Pathologist (SLP) completed education regarding results of evaluation and that intervention is warranted at this time.   Learner: Patient and Family  Education: Reviewed results and recommendations of this evaluation  Evaluation of Education:  Verbalizes understanding    This plan may be re-evaluated and revised as warranted. Evaluation Time includes thorough review of current medical information, gathering information on past medical history/social history and prior level of function, completion of standardized testing/informal observation of tasks, assessment of data and education on plan of care and goals. [x]The admitting diagnosis and active problem list, have been reviewed prior to initiation of this evaluation.     CPT Code: 54411  dysphagia study    ACTIVE PROBLEM LIST:   Patient Active Problem List   Diagnosis    Sepsis ([facility]    Pneumonia    CAD (coronary artery disease)    Hyperlipidemia    Dysphagia    Subdural hematoma ([address]    H/O: CVA (cerebrovascular accident)    Essential hypertension    Tremors of nervous system    Dementia ([address]    Thrombocytopenia ([address]    Anemia    Urinary retention    Drop attack    Protein-calorie malnutrition, moderate ([address]    Delirium secondary to multiple medical problems    Postoperative anemia due to acute blood loss    Peptic ulcer disease with hemorrhage    Influenza B    Sepsis secondary to influenza B ([address]    GI bleed    Multiple skin tears    Closed left hip fracture, initial encounter ([facility]    MARIETTA (acute kidney injury) ([address]    History of left hip hemiarthroplasty       Nell Arreola MSCCC/SLP  Speech Language Pathologist  [id number]

## 2021-11-17 NOTE — TELEPHONE ENCOUNTER
----- Message from Tanishajoseph Andino sent at 11/17/2021  2:54 PM EST -----  Subject: Referral Request    QUESTIONS   Reason for referral request? To get a feeding tube removed out of his   stomach, he is in a rehab and needs it out asa. needs a call back   Has the physician seen you for this condition before? No   Preferred Specialist (if applicable)? Do you already have an appointment scheduled? Additional Information for Provider?   ---------------------------------------------------------------------------  --------------  CALL BACK INFO  What is the best way for the office to contact you? OK to leave message on   voicemail  Preferred Call Back Phone Number?  [phone]

## 2021-11-19 NOTE — TELEPHONE ENCOUNTER
I called and spoke to Yue Redding - who advised he does not use the Peg tube at all. He will not leave it alone, and they fear he will pull it out. Distraction and covering it does not help. I called and spoke to Dr. Stormy Tran, who advised they would need a note from you stating it is OK for removal.     He did have a swallow study done - results showed     RESULTS:                       DYSPHAGIA DIAGNOSIS:  mild-moderate oropharyngeal phase dysphagia      DIET RECOMMENDATIONS:  Pureed consistency solids (IDDSI level 4) with  thin liquids (IDDSI level 0)     FEEDING RECOMMENDATIONS:               Assistance level:  Set-up is required for all oral intake, Supervision is needed during all oral intake                 Compensatory strategies recommended: Double swallow, Effortful swallow, Small bites/sips, Alternate solids and liquids and Throat clear                 Discussed recommendations with nursing and/or faxed report to referring provider: Yes     Laryngeal Penetration and Aspiration:  Penetration WITHOUT aspiration was observed in today's study with  thin liquid, mildly thick liquid (nectar)    Please Advise if OK for Peg tube to be removed, so that I can contact their office to arrange.

## 2021-11-22 NOTE — TELEPHONE ENCOUNTER
MA attempted to call Dr. Kurt Soto office. Pressed 1 but unable to talk with staff and unable to leave the message on the phone.    Electronically signed by Jackie Kelley on 11/22/2021 at 4:30 PM

## 2021-11-22 NOTE — TELEPHONE ENCOUNTER
Left message at Dr. Sophia Foy to call back regarding this matter . Called Daughter Tamir Torres. She advised that her dad was NOT a full code, she would dig out the paper and bring to next OV. But is agreeable to the plan.

## 2021-11-23 NOTE — TELEPHONE ENCOUNTER
Spoke with Dr. Lakeisha Hamlin office [facility]. Per Maryana Oquendo, the pt is having dementia and he has been trying to pull it out multiple time. They cover the site but he does not leave it alone. The daughter ais worried that he might ripped it off. Spoke with Dr. Patric Patel. Per Dr. Patric Patel, he can see the pt tomorrow in the office. Notified the daughter, he is scheduled at 2 pm in [facility]. Gave the directions to the office. she verbalized understanding.

## 2021-11-23 NOTE — TELEPHONE ENCOUNTER
Patient requesting the following Rx be written, new from the hospital..[facility]       Eliquis 2.5 mg  #90  Si qd    Carbidopa-Levodopa 25/100  #270  Si tid    (DC Aspirin per Pallavi Herrera)      Written    Please call Pallavi Herrera when ready  [phone]

## 2021-11-24 PROBLEM — H35.30 AGE-RELATED MACULAR DEGENERATION: Status: ACTIVE | Noted: 2021-01-01

## 2021-11-24 NOTE — TELEPHONE ENCOUNTER
Double check doses please. Eliquis should be scheduled 2.5 mg twice daily. Also, make sure patient is not taking primidone.

## 2021-11-24 NOTE — PROGRESS NOTES
General Surgery Progress Note:    Cc: peg tube    S: not using the peg anymore,      Objective:  @/62 (Site: Left Upper Arm, Position: Sitting, Cuff Size: Large Adult)   Pulse 91   Temp 97 °F (36.1 °C) (Infrared)   Ht 5' 9\" (1.753 m)   BMI 21.42 kg/m² @    Physical -     Gen: NAD  Resp: Breathing Comfortably, no resp distress  CV: Reg Rate  Abd: peg in place  EXT nvi    Assessment/Plan: s/p peg insertion no longer using the tube    - PEG removed no issues  - FU Prn.      Electronically Signed by Amadeo Camejo MD FACS   2:05 PM

## 2021-11-24 NOTE — TELEPHONE ENCOUNTER
Eliquis 2.5 mg ordered 10/14/2021 #60 Sig 1 twice daily according to the medication list.  The recommended dose for a patient his age is 2.5 mg twice daily. Prescription sent to pharmacy.

## 2021-12-01 NOTE — PROGRESS NOTES
Patient presents today for 6 wk post op L hip shyam 10/14/21. Patient is in PT twice a week. Patient's son would like to bring to doctor's attention that patient's Right leg is trailing behind the repaired leg, right leg is behind behind the left as he walks. Patient would also like to talk about the third vaccine.

## 2021-12-01 NOTE — PROGRESS NOTES
Chief Complaint   Patient presents with    Post-Op Check     6 wk post op L hip shyam 10/14/21       OP:SURGEON: Dr. Azeem Crane MD  DATE OF PROCEDURE: 10/14/2021  PROCEDURE:Left hip hemiarthroplasty for displaced femoral neck fracture    Subjective:  Halina Rose is approximately 6 weeks from above DOS. Presents with his son today. Patient denies any pain or paresthesias about the L LE or groin. States he is receiving PT 2x per week and is full WB L LE and uses walker for support. No recent injuries. His son says he noticed the patient's non-affected leg sometimes lagging behind his operative leg while ambulating, but otherwise doing well. Denies calf pain, CP, SOB, fever, chills, malaise, myalgias. Review of Systems -  all pertinent positives and negatives in HPI. Objective:    General: Alert and oriented X 3, normocephalic atraumatic, external ears and eye normal, sclera clear, no acute distress, respirations easy and unlabored with no audible wheezes, skin warm and dry, speech and dress appropriate for noted age, affect euthymic. Extremity:  Left Lower Extremity  Skin clean dry and intact, without signs of infection  nontender to palpation about the L hip and thigh  No tenderness to PROM of the L hip  4/5 L hip, equal to contralateral hip  Patient seen full WB B LE with walker, slow antalgic gait when ambulating, no discomfort   Compartments supple throughout thigh and leg  Calf supple and nontender  Demonstrates active knee flexion/extension, ankle plantar/dorsiflexion/great toe extension. States sensation intact to touch in sural/deep peroneal/superficial peroneal/saphenous/posterior tibial nerve distributions to foot/ankle. Palpable dorsalis pedis and posterior tibialis pulses, cap refill brisk in toes, foot warm/perfused.              Temp 97.6 °F (36.4 °C) (Oral)     XR:   Narrative   EXAMINATION:   ONE XRAY VIEW OF THE PELVIS AND TWO XRAY VIEWS LEFT HIP       12/1/2021 1:12 pm     COMPARISON:   11/01/2021       HISTORY:   ORDERING SYSTEM PROVIDED HISTORY: History of left hip hemiarthroplasty   TECHNOLOGIST PROVIDED HISTORY:   What reading provider will be dictating this exam?->CRC       FINDINGS:   Total left hip arthroplasty with no obvious hardware abnormalities. Jannett Rubinstein   evidence of acute fracture.  No focal osseus lesion.   Pelvis is intact.             Assessment:   Diagnosis Orders   1. History of left hip hemiarthroplasty         Plan:   Reviewed x-rays with patient today in office    WBAT L LE    Continue PT and HEP   Continue walker when needed    Can dc hip precautions    Discussed with Dr. Hannah Torres     Follow up PRN    Electronically signed by Leigh Gonzalez PA-C on 12/2/2021 at 7:48 AM  Note: This report was completed using [employer] voiced recognition software. Every effort has been made to ensure accuracy; however, inadvertent computerized transcription errors may be present.

## 2021-12-03 NOTE — TELEPHONE ENCOUNTER
The [facility] was not sent to pharmacy. Spoke to Lamsa Boards, they have 25 pills left, and do not want to run out before appointment after rolf.

## 2021-12-06 NOTE — ED PROVIDER NOTES
[id number] [facility]  Department of Emergency Medicine  UC Encounter Note  21   2:46 PM EST      NAME: Reginaldo Vu  :  1925  MRN:  [id number]    Chief Complaint: Urinary Tract Infection (pain in abd  strong oder in urine incont of bowel    last 24 hourswhen it started)      This is a 59-year-old male the presents to urgent care with his son. The son states he thinks his father has a urinary tract infection. He has noticed a strong odor to his urine and there is been some incontinence of bowel as well. He states he has noticed that sometimes his father have a urinary infection when he has these symptoms. He denies any fevers or chills. On first contact patient is patient is in no acute distress. He does receive 24-hour care according to the son. He is in a wheelchair. He has a recent history of a injury to his pelvis. Review of Systems  Pertinent positives and negatives are stated within HPI, all other systems reviewed and are negative. Physical Exam  Vitals and nursing note reviewed. Constitutional:       Appearance: He is well-developed. HENT:      Head: Normocephalic and atraumatic. Jaw: No trismus. Right Ear: Hearing, tympanic membrane, ear canal and external ear normal.      Left Ear: Hearing, tympanic membrane, ear canal and external ear normal.      Nose: Nose normal.      Right Sinus: No maxillary sinus tenderness or frontal sinus tenderness. Left Sinus: No maxillary sinus tenderness or frontal sinus tenderness. Mouth/Throat:      Pharynx: Uvula midline. No uvula swelling. Eyes:      General: Lids are normal.      Conjunctiva/sclera: Conjunctivae normal.      Pupils: Pupils are equal, round, and reactive to light. Cardiovascular:      Rate and Rhythm: Normal rate and regular rhythm. Heart sounds: Normal heart sounds. No murmur heard.       Pulmonary:      Effort: Pulmonary effort is normal.      Breath sounds: Normal breath sounds. Abdominal:      General: Bowel sounds are normal.      Palpations: Abdomen is soft. Abdomen is not rigid. Tenderness: There is no abdominal tenderness. There is no guarding or rebound. Musculoskeletal:      Cervical back: Normal range of motion and neck supple. Skin:     General: Skin is warm and dry. Findings: No abrasion or rash. Neurological:      Mental Status: He is alert. Mental status is at baseline. GCS: GCS eye subscore is 4. GCS verbal subscore is 5. GCS motor subscore is 6. Cranial Nerves: No cranial nerve deficit. Sensory: No sensory deficit. Coordination: Coordination normal.         Procedures    MDM  Number of Diagnoses or Management Options  Urinary tract infection symptoms  Diagnosis management comments: Patient is in no acute distress but he is incontinent of urine hard to obtain urine sample here at the urgent care. We will empirically treat him for urinary tract infection. Told the son to follow-up with primary care if not better after the treatment. Patient has been on Odella Coaster in the past without difficulty or adverse side effects. We will place him on Omnicef. Instructions given.           --------------------------------------------- PAST HISTORY ---------------------------------------------  Past Medical History:  has a past medical history of CAD (coronary artery disease), Cerebral artery occlusion with cerebral infarction ([facility] [address] Hemorrhage, Raynaud disease, and Tremors of nervous system. Past Surgical History:  has a past surgical history that includes Coronary angioplasty with stent; Upper gastrointestinal endoscopy (N/A, 11/13/2019); hip surgery (Left, 10/14/2021); and Upper gastrointestinal endoscopy (N/A, 10/20/2021). Social History:  reports that he quit smoking about 45 years ago. His smoking use included cigarettes. He has never used smokeless tobacco. He reports previous alcohol use.  He reports that he does not use drugs. Family History: family history is not on file. The patients home medications have been reviewed. Allergies: Clindamycin, Ativan [lorazepam], Clindamycin/lincomycin, and Pcn [penicillins]    -------------------------------------------------- RESULTS -------------------------------------------------  No results found for this visit on 12/06/21. No orders to display       ------------------------- NURSING NOTES AND VITALS REVIEWED ---------------------------   The nursing notes within the ED encounter and vital signs as below have been reviewed. BP (!) 146/89   Pulse 85   Temp 97.2 °F (36.2 °C)   Resp 20   Ht 5' 8\" (1.727 m)   Wt 131 lb (59.4 kg)   SpO2 93%   BMI 19.92 kg/m²   Oxygen Saturation Interpretation: Normal      ------------------------------------------ PROGRESS NOTES ------------------------------------------   I have spoken with the patient  And son and discussed todays results, in addition to providing specific details for the plan of care and counseling regarding the diagnosis and prognosis. Their questions are answered at this time and they are agreeable with the plan.      --------------------------------- ADDITIONAL PROVIDER NOTES ---------------------------------     This patient is stable for discharge. I have shared the specific conditions for return, as well as the importance of follow-up. * NOTE: This report was transcribed using voice recognition software. Every effort was made to ensure accuracy; however, inadvertent computerized transcription errors may be present.    --------------------------------- IMPRESSION AND DISPOSITION ---------------------------------    IMPRESSION  1.  Urinary tract infection symptoms        DISPOSITION  Disposition: Discharge to home  Patient condition is good       Gladys Medrano, SHANNAN  12/06/21 1534

## 2021-12-06 NOTE — TELEPHONE ENCOUNTER
Pc to daughter states urine looks dark . Carlos Gustafson is going to make sure pt is taking in enough fluids.  Also pt was constipated however Miralax has been effective

## 2021-12-06 NOTE — TELEPHONE ENCOUNTER
Love Quevedo called stating the patient has been constipated for 3 days. Love Quevedo has been giving the patient miralax twice daily and it seems to be helping. Love Quevedo also stated the patient's urine is dark and he's not walking, she is concerned about a UTI and would like the patient to be seen. Advised walk in clinic due to limited availability in office and Love Quevedo refused. Please advise.

## 2021-12-07 NOTE — TELEPHONE ENCOUNTER
[facility] and [facility] due to insurance denial. Effective 12/7/2021.

## 2021-12-29 NOTE — TELEPHONE ENCOUNTER
Home care nurse called, She saw patient yesterday and he was doing great, Lungs were clear. The family called her today concerned because he has been coughing again. She advised that the family stopped the mucinex 3-4 days ago, so she did advise for them to restart that per current med orders. She believes that will correct the issue, but wanted to reach out to you to ensure you don't want anything further done. She is not scheduled to go back out to see him again for 2 weeks.   Please advise if you would like anything additional and we will  Notify patients family

## 2021-12-30 NOTE — TELEPHONE ENCOUNTER
Pc to daughter continue Mucinex.  Keep eye on pt while eating to make sure he swallows appropriately

## 2021-12-30 NOTE — TELEPHONE ENCOUNTER
Continue mucinex. Certainly a concern for dysphagia- keep an eye on him while eating to make sure he swallows appropriately.

## 2022-01-01 ENCOUNTER — TELEPHONE (OUTPATIENT)
Dept: PRIMARY CARE CLINIC | Age: 87
End: 2022-01-01

## 2022-01-01 ENCOUNTER — APPOINTMENT (OUTPATIENT)
Dept: GENERAL RADIOLOGY | Age: 87
DRG: 291 | End: 2022-01-01
Payer: MEDICARE

## 2022-01-01 ENCOUNTER — HOSPITAL ENCOUNTER (INPATIENT)
Age: 87
LOS: 3 days | Discharge: HOME HEALTH | DRG: 291 | End: 2022-02-26
Attending: EMERGENCY MEDICINE | Admitting: INTERNAL MEDICINE
Payer: MEDICARE

## 2022-01-01 ENCOUNTER — OFFICE VISIT (OUTPATIENT)
Dept: PRIMARY CARE CLINIC | Age: 87
End: 2022-01-01
Payer: MEDICARE

## 2022-01-01 ENCOUNTER — APPOINTMENT (OUTPATIENT)
Dept: CT IMAGING | Age: 87
DRG: 291 | End: 2022-01-01
Payer: MEDICARE

## 2022-01-01 ENCOUNTER — APPOINTMENT (OUTPATIENT)
Dept: INTERVENTIONAL RADIOLOGY/VASCULAR | Age: 87
DRG: 291 | End: 2022-01-01
Payer: MEDICARE

## 2022-01-01 VITALS
DIASTOLIC BLOOD PRESSURE: 68 MMHG | OXYGEN SATURATION: 94 % | TEMPERATURE: 97.3 F | SYSTOLIC BLOOD PRESSURE: 118 MMHG | HEART RATE: 72 BPM | BODY MASS INDEX: 20.46 KG/M2 | WEIGHT: 135 LBS | HEIGHT: 68 IN

## 2022-01-01 VITALS
TEMPERATURE: 97.5 F | OXYGEN SATURATION: 96 % | WEIGHT: 124.6 LBS | RESPIRATION RATE: 18 BRPM | SYSTOLIC BLOOD PRESSURE: 130 MMHG | DIASTOLIC BLOOD PRESSURE: 68 MMHG | BODY MASS INDEX: 20.03 KG/M2 | HEIGHT: 66 IN | HEART RATE: 89 BPM

## 2022-01-01 DIAGNOSIS — J02.9 PHARYNGITIS, UNSPECIFIED ETIOLOGY: Primary | ICD-10-CM

## 2022-01-01 DIAGNOSIS — Z09 HOSPITAL DISCHARGE FOLLOW-UP: Primary | ICD-10-CM

## 2022-01-01 DIAGNOSIS — F03.90 DEMENTIA WITHOUT BEHAVIORAL DISTURBANCE, UNSPECIFIED DEMENTIA TYPE: ICD-10-CM

## 2022-01-01 DIAGNOSIS — I50.9 ACUTE CONGESTIVE HEART FAILURE, UNSPECIFIED HEART FAILURE TYPE (HCC): ICD-10-CM

## 2022-01-01 DIAGNOSIS — J18.9 PNEUMONIA OF RIGHT LOWER LOBE DUE TO INFECTIOUS ORGANISM: ICD-10-CM

## 2022-01-01 DIAGNOSIS — D64.9 ANEMIA, UNSPECIFIED TYPE: ICD-10-CM

## 2022-01-01 DIAGNOSIS — J96.01 ACUTE RESPIRATORY FAILURE WITH HYPOXIA (HCC): Primary | ICD-10-CM

## 2022-01-01 DIAGNOSIS — Z86.73 H/O: CVA (CEREBROVASCULAR ACCIDENT): Primary | ICD-10-CM

## 2022-01-01 LAB
ABO/RH: NORMAL
ALBUMIN SERPL-MCNC: 3.9 G/DL (ref 3.5–5.2)
ALP BLD-CCNC: 128 U/L (ref 40–129)
ALT SERPL-CCNC: 9 U/L (ref 0–40)
ANION GAP SERPL CALCULATED.3IONS-SCNC: 13 MMOL/L (ref 7–16)
ANION GAP SERPL CALCULATED.3IONS-SCNC: 9 MMOL/L (ref 7–16)
ANTIBODY SCREEN: NORMAL
APPEARANCE FLUID: CLEAR
AST SERPL-CCNC: 48 U/L (ref 0–39)
B.E.: 0.1 MMOL/L (ref -3–3)
BACTERIA: NORMAL /HPF
BASOPHILS ABSOLUTE: 0.01 E9/L (ref 0–0.2)
BASOPHILS RELATIVE PERCENT: 0.2 % (ref 0–2)
BASOPHILS RELATIVE PERCENT: 0.3 % (ref 0–2)
BILIRUB SERPL-MCNC: 0.4 MG/DL (ref 0–1.2)
BILIRUBIN DIRECT: <0.2 MG/DL (ref 0–0.3)
BILIRUBIN URINE: NEGATIVE
BILIRUBIN, INDIRECT: ABNORMAL MG/DL (ref 0–1)
BLOOD BANK DISPENSE STATUS: NORMAL
BLOOD BANK PRODUCT CODE: NORMAL
BLOOD CULTURE, ROUTINE: NORMAL
BLOOD, URINE: NEGATIVE
BODY FLUID CULTURE, STERILE: NORMAL
BPU ID: NORMAL
BUN BLDV-MCNC: 17 MG/DL (ref 6–23)
BUN BLDV-MCNC: 18 MG/DL (ref 6–23)
BUN BLDV-MCNC: 23 MG/DL (ref 6–23)
BUN BLDV-MCNC: 23 MG/DL (ref 6–23)
CALCIUM SERPL-MCNC: 8.3 MG/DL (ref 8.6–10.2)
CALCIUM SERPL-MCNC: 8.4 MG/DL (ref 8.6–10.2)
CALCIUM SERPL-MCNC: 8.4 MG/DL (ref 8.6–10.2)
CALCIUM SERPL-MCNC: 9.1 MG/DL (ref 8.6–10.2)
CELL COUNT FLUID TYPE: NORMAL
CHLORIDE BLD-SCNC: 100 MMOL/L (ref 98–107)
CHLORIDE BLD-SCNC: 101 MMOL/L (ref 98–107)
CHLORIDE BLD-SCNC: 97 MMOL/L (ref 98–107)
CHLORIDE BLD-SCNC: 98 MMOL/L (ref 98–107)
CHOLESTEROL, TOTAL: 97 MG/DL (ref 0–199)
CLARITY: CLEAR
CO2: 23 MMOL/L (ref 22–29)
CO2: 25 MMOL/L (ref 22–29)
CO2: 26 MMOL/L (ref 22–29)
CO2: 27 MMOL/L (ref 22–29)
COHB: 0.4 % (ref 0–1.5)
COLOR FLUID: COLORLESS
COLOR: YELLOW
CREAT SERPL-MCNC: 0.9 MG/DL (ref 0.7–1.2)
CRITICAL: ABNORMAL
CRITICAL: ABNORMAL
CULTURE, BLOOD 2: NORMAL
DATE ANALYZED: ABNORMAL
DATE ANALYZED: ABNORMAL
DATE OF COLLECTION: ABNORMAL
DATE OF COLLECTION: ABNORMAL
DESCRIPTION BLOOD BANK: NORMAL
EKG ATRIAL RATE: 61 BPM
EKG Q-T INTERVAL: 422 MS
EKG QRS DURATION: 140 MS
EKG QTC CALCULATION (BAZETT): 483 MS
EKG R AXIS: 66 DEGREES
EKG T AXIS: 19 DEGREES
EKG VENTRICULAR RATE: 79 BPM
EOSINOPHILS ABSOLUTE: 0.02 E9/L (ref 0.05–0.5)
EOSINOPHILS ABSOLUTE: 0.02 E9/L (ref 0.05–0.5)
EOSINOPHILS ABSOLUTE: 0.03 E9/L (ref 0.05–0.5)
EOSINOPHILS ABSOLUTE: 0.04 E9/L (ref 0.05–0.5)
EOSINOPHILS RELATIVE PERCENT: 0.5 % (ref 0–6)
EOSINOPHILS RELATIVE PERCENT: 0.6 % (ref 0–6)
EOSINOPHILS RELATIVE PERCENT: 0.8 % (ref 0–6)
EOSINOPHILS RELATIVE PERCENT: 1.1 % (ref 0–6)
FLUID TYPE: NORMAL
GFR AFRICAN AMERICAN: >60
GFR NON-AFRICAN AMERICAN: >60 ML/MIN/1.73
GLUCOSE BLD-MCNC: 102 MG/DL (ref 74–99)
GLUCOSE BLD-MCNC: 108 MG/DL (ref 74–99)
GLUCOSE BLD-MCNC: 95 MG/DL (ref 74–99)
GLUCOSE BLD-MCNC: 97 MG/DL (ref 74–99)
GLUCOSE URINE: NEGATIVE MG/DL
GLUCOSE, FLUID: 107 MG/DL
GRAM STAIN ORDERABLE: NORMAL
GRAM STAIN RESULT: NORMAL
HCO3: 24.1 MMOL/L (ref 22–26)
HCT VFR BLD CALC: 23.4 % (ref 37–54)
HCT VFR BLD CALC: 24.4 % (ref 37–54)
HCT VFR BLD CALC: 27.1 % (ref 37–54)
HCT VFR BLD CALC: 29.4 % (ref 37–54)
HCT VFR BLD CALC: 29.5 % (ref 37–54)
HDLC SERPL-MCNC: 50 MG/DL
HEMOGLOBIN: 7.2 G/DL (ref 12.5–16.5)
HEMOGLOBIN: 7.4 G/DL (ref 12.5–16.5)
HEMOGLOBIN: 8.4 G/DL (ref 12.5–16.5)
HEMOGLOBIN: 8.7 G/DL (ref 12.5–16.5)
HEMOGLOBIN: 9.4 G/DL (ref 12.5–16.5)
HHB: 5.7 % (ref 0–5)
IMMATURE GRANULOCYTES #: 0.01 E9/L
IMMATURE GRANULOCYTES #: 0.02 E9/L
IMMATURE GRANULOCYTES #: 0.02 E9/L
IMMATURE GRANULOCYTES #: 0.03 E9/L
IMMATURE GRANULOCYTES %: 0.3 % (ref 0–5)
IMMATURE GRANULOCYTES %: 0.5 % (ref 0–5)
IMMATURE GRANULOCYTES %: 0.6 % (ref 0–5)
IMMATURE GRANULOCYTES %: 0.9 % (ref 0–5)
INR BLD: 1.3
KETONES, URINE: NEGATIVE MG/DL
L. PNEUMOPHILA SEROGP 1 UR AG: NORMAL
LAB: ABNORMAL
LAB: ABNORMAL
LACTIC ACID, SEPSIS: 1.5 MMOL/L (ref 0.5–1.9)
LACTIC ACID, SEPSIS: 1.7 MMOL/L (ref 0.5–1.9)
LD, FLUID: 85 U/L
LDL CHOLESTEROL CALCULATED: 39 MG/DL (ref 0–99)
LEUKOCYTE ESTERASE, URINE: NEGATIVE
LIPASE: 32 U/L (ref 13–60)
LV EF: 53 %
LVEF MODALITY: NORMAL
LYMPHOCYTES ABSOLUTE: 1.05 E9/L (ref 1.5–4)
LYMPHOCYTES ABSOLUTE: 1.08 E9/L (ref 1.5–4)
LYMPHOCYTES ABSOLUTE: 1.19 E9/L (ref 1.5–4)
LYMPHOCYTES ABSOLUTE: 1.41 E9/L (ref 1.5–4)
LYMPHOCYTES RELATIVE PERCENT: 29.5 % (ref 20–42)
LYMPHOCYTES RELATIVE PERCENT: 31.8 % (ref 20–42)
LYMPHOCYTES RELATIVE PERCENT: 33.6 % (ref 20–42)
LYMPHOCYTES RELATIVE PERCENT: 34.9 % (ref 20–42)
Lab: ABNORMAL
MAGNESIUM: 2.1 MG/DL (ref 1.6–2.6)
MAGNESIUM: 2.2 MG/DL (ref 1.6–2.6)
MCH RBC QN AUTO: 28.2 PG (ref 26–35)
MCH RBC QN AUTO: 28.3 PG (ref 26–35)
MCH RBC QN AUTO: 28.7 PG (ref 26–35)
MCH RBC QN AUTO: 28.9 PG (ref 26–35)
MCHC RBC AUTO-ENTMCNC: 29.6 % (ref 32–34.5)
MCHC RBC AUTO-ENTMCNC: 30.3 % (ref 32–34.5)
MCHC RBC AUTO-ENTMCNC: 30.8 % (ref 32–34.5)
MCHC RBC AUTO-ENTMCNC: 31 % (ref 32–34.5)
MCV RBC AUTO: 90.9 FL (ref 80–99.9)
MCV RBC AUTO: 94 FL (ref 80–99.9)
MCV RBC AUTO: 94.6 FL (ref 80–99.9)
MCV RBC AUTO: 95.8 FL (ref 80–99.9)
METHB: 0.4 % (ref 0–1.5)
MODE: ABNORMAL
MONOCYTE, FLUID: 99 %
MONOCYTES ABSOLUTE: 0.35 E9/L (ref 0.1–0.95)
MONOCYTES ABSOLUTE: 0.37 E9/L (ref 0.1–0.95)
MONOCYTES ABSOLUTE: 0.44 E9/L (ref 0.1–0.95)
MONOCYTES ABSOLUTE: 0.46 E9/L (ref 0.1–0.95)
MONOCYTES RELATIVE PERCENT: 10.9 % (ref 2–12)
MONOCYTES RELATIVE PERCENT: 10.9 % (ref 2–12)
MONOCYTES RELATIVE PERCENT: 12.9 % (ref 2–12)
MONOCYTES RELATIVE PERCENT: 9.9 % (ref 2–12)
NEUTROPHIL, FLUID: 1 %
NEUTROPHILS ABSOLUTE: 1.89 E9/L (ref 1.8–7.3)
NEUTROPHILS ABSOLUTE: 1.94 E9/L (ref 1.8–7.3)
NEUTROPHILS ABSOLUTE: 1.99 E9/L (ref 1.8–7.3)
NEUTROPHILS ABSOLUTE: 2.14 E9/L (ref 1.8–7.3)
NEUTROPHILS RELATIVE PERCENT: 53 % (ref 43–80)
NEUTROPHILS RELATIVE PERCENT: 54.8 % (ref 43–80)
NEUTROPHILS RELATIVE PERCENT: 55.5 % (ref 43–80)
NEUTROPHILS RELATIVE PERCENT: 55.9 % (ref 43–80)
NITRITE, URINE: NEGATIVE
NUCLEATED CELLS FLUID: 906 /UL
O2 CONTENT: 11.8 ML/DL
O2 SATURATION: 94.3 % (ref 92–98.5)
O2HB: 93.5 % (ref 94–97)
OPERATOR ID: 3
OPERATOR ID: 3095
PATIENT TEMP: 37 C
PCO2: 36.1 MMHG (ref 35–45)
PDW BLD-RTO: 15.7 FL (ref 11.5–15)
PDW BLD-RTO: 15.7 FL (ref 11.5–15)
PDW BLD-RTO: 15.8 FL (ref 11.5–15)
PDW BLD-RTO: 16.6 FL (ref 11.5–15)
PH BLOOD GAS: 7.44 (ref 7.35–7.45)
PH FLUID: ABNORMAL
PH UA: 7 (ref 5–9)
PHOSPHORUS: 3.2 MG/DL (ref 2.5–4.5)
PLATELET # BLD: 107 E9/L (ref 130–450)
PLATELET # BLD: 111 E9/L (ref 130–450)
PLATELET # BLD: 121 E9/L (ref 130–450)
PLATELET # BLD: 142 E9/L (ref 130–450)
PMV BLD AUTO: 10.5 FL (ref 7–12)
PMV BLD AUTO: 10.7 FL (ref 7–12)
PMV BLD AUTO: 11.1 FL (ref 7–12)
PMV BLD AUTO: 11.5 FL (ref 7–12)
PO2: 74 MMHG (ref 75–100)
POTASSIUM REFLEX MAGNESIUM: 4.8 MMOL/L (ref 3.5–5)
POTASSIUM SERPL-SCNC: 3.5 MMOL/L (ref 3.5–5)
POTASSIUM SERPL-SCNC: 3.9 MMOL/L (ref 3.5–5)
POTASSIUM SERPL-SCNC: 4 MMOL/L (ref 3.5–5)
POTASSIUM SERPL-SCNC: 4.2 MMOL/L (ref 3.5–5)
PRO-BNP: 3363 PG/ML (ref 0–450)
PROCALCITONIN: 0.05 NG/ML (ref 0–0.08)
PROCALCITONIN: 0.06 NG/ML (ref 0–0.08)
PROTEIN FLUID: 3.7 G/DL
PROTEIN UA: NEGATIVE MG/DL
PROTHROMBIN TIME: 14.7 SEC (ref 9.3–12.4)
RBC # BLD: 2.49 E12/L (ref 3.8–5.8)
RBC # BLD: 2.58 E12/L (ref 3.8–5.8)
RBC # BLD: 2.98 E12/L (ref 3.8–5.8)
RBC # BLD: 3.07 E12/L (ref 3.8–5.8)
RBC FLUID: <2000 /UL
RBC UA: NORMAL /HPF (ref 0–2)
REASON FOR REJECTION: NORMAL
REJECTED TEST: NORMAL
REPORT: NORMAL
SARS-COV-2, NAAT: NOT DETECTED
SODIUM BLD-SCNC: 133 MMOL/L (ref 132–146)
SODIUM BLD-SCNC: 134 MMOL/L (ref 132–146)
SODIUM BLD-SCNC: 135 MMOL/L (ref 132–146)
SODIUM BLD-SCNC: 135 MMOL/L (ref 132–146)
SOURCE, BLOOD GAS: ABNORMAL
SOURCE, BLOOD GAS: ABNORMAL
SPECIFIC GRAVITY UA: 1.01 (ref 1–1.03)
STREP PNEUMONIAE ANTIGEN, URINE: NORMAL
THB: 8.9 G/DL (ref 11.5–16.5)
THIS TEST SENT TO: NORMAL
TIME ANALYZED: 731
TIME ANALYZED: 905
TOTAL PROTEIN: 8.7 G/DL (ref 6.4–8.3)
TRIGL SERPL-MCNC: 41 MG/DL (ref 0–149)
TROPONIN, HIGH SENSITIVITY: 24 NG/L (ref 0–11)
TROPONIN, HIGH SENSITIVITY: 25 NG/L (ref 0–11)
TSH SERPL DL<=0.05 MIU/L-ACNC: 1.97 UIU/ML (ref 0.27–4.2)
URINE CULTURE, ROUTINE: NORMAL
UROBILINOGEN, URINE: 0.2 E.U./DL
VLDLC SERPL CALC-MCNC: 8 MG/DL
WBC # BLD: 3.4 E9/L (ref 4.5–11.5)
WBC # BLD: 3.5 E9/L (ref 4.5–11.5)
WBC # BLD: 3.5 E9/L (ref 4.5–11.5)
WBC # BLD: 4 E9/L (ref 4.5–11.5)
WBC UA: NORMAL /HPF (ref 0–5)

## 2022-01-01 PROCEDURE — 36592 COLLECT BLOOD FROM PICC: CPT

## 2022-01-01 PROCEDURE — 86900 BLOOD TYPING SEROLOGIC ABO: CPT

## 2022-01-01 PROCEDURE — 6360000002 HC RX W HCPCS: Performed by: INTERNAL MEDICINE

## 2022-01-01 PROCEDURE — 80048 BASIC METABOLIC PNL TOTAL CA: CPT

## 2022-01-01 PROCEDURE — 87205 SMEAR GRAM STAIN: CPT

## 2022-01-01 PROCEDURE — 36415 COLL VENOUS BLD VENIPUNCTURE: CPT

## 2022-01-01 PROCEDURE — 83735 ASSAY OF MAGNESIUM: CPT

## 2022-01-01 PROCEDURE — 76937 US GUIDE VASCULAR ACCESS: CPT

## 2022-01-01 PROCEDURE — 86850 RBC ANTIBODY SCREEN: CPT

## 2022-01-01 PROCEDURE — 93005 ELECTROCARDIOGRAM TRACING: CPT | Performed by: STUDENT IN AN ORGANIZED HEALTH CARE EDUCATION/TRAINING PROGRAM

## 2022-01-01 PROCEDURE — 84157 ASSAY OF PROTEIN OTHER: CPT

## 2022-01-01 PROCEDURE — 85018 HEMOGLOBIN: CPT

## 2022-01-01 PROCEDURE — 71045 X-RAY EXAM CHEST 1 VIEW: CPT

## 2022-01-01 PROCEDURE — C1729 CATH, DRAINAGE: HCPCS

## 2022-01-01 PROCEDURE — 2709999900 HC NON-CHARGEABLE SUPPLY

## 2022-01-01 PROCEDURE — 6370000000 HC RX 637 (ALT 250 FOR IP): Performed by: NURSE PRACTITIONER

## 2022-01-01 PROCEDURE — 2060000000 HC ICU INTERMEDIATE R&B

## 2022-01-01 PROCEDURE — 6370000000 HC RX 637 (ALT 250 FOR IP): Performed by: INTERNAL MEDICINE

## 2022-01-01 PROCEDURE — 83605 ASSAY OF LACTIC ACID: CPT

## 2022-01-01 PROCEDURE — 97530 THERAPEUTIC ACTIVITIES: CPT | Performed by: PHYSICAL THERAPIST

## 2022-01-01 PROCEDURE — 87070 CULTURE OTHR SPECIMN AEROBIC: CPT

## 2022-01-01 PROCEDURE — 84132 ASSAY OF SERUM POTASSIUM: CPT

## 2022-01-01 PROCEDURE — 83690 ASSAY OF LIPASE: CPT

## 2022-01-01 PROCEDURE — 87040 BLOOD CULTURE FOR BACTERIA: CPT

## 2022-01-01 PROCEDURE — 96366 THER/PROPH/DIAG IV INF ADDON: CPT

## 2022-01-01 PROCEDURE — 2580000003 HC RX 258: Performed by: INTERNAL MEDICINE

## 2022-01-01 PROCEDURE — 93306 TTE W/DOPPLER COMPLETE: CPT

## 2022-01-01 PROCEDURE — 80061 LIPID PANEL: CPT

## 2022-01-01 PROCEDURE — 83986 ASSAY PH BODY FLUID NOS: CPT

## 2022-01-01 PROCEDURE — 1111F DSCHRG MED/CURRENT MED MERGE: CPT | Performed by: FAMILY MEDICINE

## 2022-01-01 PROCEDURE — 82947 ASSAY GLUCOSE BLOOD QUANT: CPT

## 2022-01-01 PROCEDURE — 89051 BODY FLUID CELL COUNT: CPT

## 2022-01-01 PROCEDURE — 97165 OT EVAL LOW COMPLEX 30 MIN: CPT | Performed by: OCCUPATIONAL THERAPIST

## 2022-01-01 PROCEDURE — 80076 HEPATIC FUNCTION PANEL: CPT

## 2022-01-01 PROCEDURE — 83880 ASSAY OF NATRIURETIC PEPTIDE: CPT

## 2022-01-01 PROCEDURE — 32555 ASPIRATE PLEURA W/ IMAGING: CPT

## 2022-01-01 PROCEDURE — 97535 SELF CARE MNGMENT TRAINING: CPT | Performed by: OCCUPATIONAL THERAPIST

## 2022-01-01 PROCEDURE — 97116 GAIT TRAINING THERAPY: CPT | Performed by: PHYSICAL THERAPIST

## 2022-01-01 PROCEDURE — 86901 BLOOD TYPING SEROLOGIC RH(D): CPT

## 2022-01-01 PROCEDURE — 6360000004 HC RX CONTRAST MEDICATION: Performed by: RADIOLOGY

## 2022-01-01 PROCEDURE — 85025 COMPLETE CBC W/AUTO DIFF WBC: CPT

## 2022-01-01 PROCEDURE — 86923 COMPATIBILITY TEST ELECTRIC: CPT

## 2022-01-01 PROCEDURE — 84145 PROCALCITONIN (PCT): CPT

## 2022-01-01 PROCEDURE — 87088 URINE BACTERIA CULTURE: CPT

## 2022-01-01 PROCEDURE — 97530 THERAPEUTIC ACTIVITIES: CPT

## 2022-01-01 PROCEDURE — 2700000000 HC OXYGEN THERAPY PER DAY

## 2022-01-01 PROCEDURE — 0W993ZZ DRAINAGE OF RIGHT PLEURAL CAVITY, PERCUTANEOUS APPROACH: ICD-10-PCS | Performed by: RADIOLOGY

## 2022-01-01 PROCEDURE — 96365 THER/PROPH/DIAG IV INF INIT: CPT

## 2022-01-01 PROCEDURE — 97161 PT EVAL LOW COMPLEX 20 MIN: CPT

## 2022-01-01 PROCEDURE — 87449 NOS EACH ORGANISM AG IA: CPT

## 2022-01-01 PROCEDURE — 81001 URINALYSIS AUTO W/SCOPE: CPT

## 2022-01-01 PROCEDURE — 88305 TISSUE EXAM BY PATHOLOGIST: CPT

## 2022-01-01 PROCEDURE — 84100 ASSAY OF PHOSPHORUS: CPT

## 2022-01-01 PROCEDURE — P9016 RBC LEUKOCYTES REDUCED: HCPCS

## 2022-01-01 PROCEDURE — 85610 PROTHROMBIN TIME: CPT

## 2022-01-01 PROCEDURE — 92610 EVALUATE SWALLOWING FUNCTION: CPT | Performed by: SPEECH-LANGUAGE PATHOLOGIST

## 2022-01-01 PROCEDURE — 97110 THERAPEUTIC EXERCISES: CPT

## 2022-01-01 PROCEDURE — 84484 ASSAY OF TROPONIN QUANT: CPT

## 2022-01-01 PROCEDURE — 6370000000 HC RX 637 (ALT 250 FOR IP): Performed by: STUDENT IN AN ORGANIZED HEALTH CARE EDUCATION/TRAINING PROGRAM

## 2022-01-01 PROCEDURE — 87635 SARS-COV-2 COVID-19 AMP PRB: CPT

## 2022-01-01 PROCEDURE — 97530 THERAPEUTIC ACTIVITIES: CPT | Performed by: OCCUPATIONAL THERAPIST

## 2022-01-01 PROCEDURE — 83615 LACTATE (LD) (LDH) ENZYME: CPT

## 2022-01-01 PROCEDURE — 71275 CT ANGIOGRAPHY CHEST: CPT

## 2022-01-01 PROCEDURE — 36430 TRANSFUSION BLD/BLD COMPNT: CPT

## 2022-01-01 PROCEDURE — 84443 ASSAY THYROID STIM HORMONE: CPT

## 2022-01-01 PROCEDURE — 85014 HEMATOCRIT: CPT

## 2022-01-01 PROCEDURE — 99285 EMERGENCY DEPT VISIT HI MDM: CPT

## 2022-01-01 PROCEDURE — 6360000002 HC RX W HCPCS: Performed by: EMERGENCY MEDICINE

## 2022-01-01 PROCEDURE — 99214 OFFICE O/P EST MOD 30 MIN: CPT | Performed by: FAMILY MEDICINE

## 2022-01-01 PROCEDURE — 94640 AIRWAY INHALATION TREATMENT: CPT

## 2022-01-01 PROCEDURE — 2580000003 HC RX 258: Performed by: EMERGENCY MEDICINE

## 2022-01-01 PROCEDURE — 88112 CYTOPATH CELL ENHANCE TECH: CPT

## 2022-01-01 PROCEDURE — 82805 BLOOD GASES W/O2 SATURATION: CPT

## 2022-01-01 RX ORDER — POLYETHYLENE GLYCOL 3350 17 G/17G
17 POWDER, FOR SOLUTION ORAL DAILY
Status: DISCONTINUED | OUTPATIENT
Start: 2022-01-01 | End: 2022-01-01 | Stop reason: HOSPADM

## 2022-01-01 RX ORDER — CEPHALEXIN 500 MG/1
500 CAPSULE ORAL 2 TIMES DAILY
Qty: 14 CAPSULE | Refills: 0 | Status: SHIPPED | OUTPATIENT
Start: 2022-01-01 | End: 2022-01-01

## 2022-01-01 RX ORDER — SODIUM CHLORIDE 0.9 % (FLUSH) 0.9 %
5-40 SYRINGE (ML) INJECTION PRN
Status: DISCONTINUED | OUTPATIENT
Start: 2022-01-01 | End: 2022-01-01 | Stop reason: HOSPADM

## 2022-01-01 RX ORDER — SODIUM CHLORIDE 0.9 % (FLUSH) 0.9 %
5-40 SYRINGE (ML) INJECTION EVERY 12 HOURS SCHEDULED
Status: DISCONTINUED | OUTPATIENT
Start: 2022-01-01 | End: 2022-01-01 | Stop reason: HOSPADM

## 2022-01-01 RX ORDER — PRAVASTATIN SODIUM 20 MG
20 TABLET ORAL DAILY
Status: DISCONTINUED | OUTPATIENT
Start: 2022-01-01 | End: 2022-01-01 | Stop reason: HOSPADM

## 2022-01-01 RX ORDER — PRAVASTATIN SODIUM 20 MG
20 TABLET ORAL DAILY
Qty: 30 TABLET | Refills: 0 | Status: SHIPPED | OUTPATIENT
Start: 2022-01-01 | End: 2022-01-01

## 2022-01-01 RX ORDER — NICOTINE POLACRILEX 4 MG/1
20 GUM, CHEWING ORAL 2 TIMES DAILY
Status: DISCONTINUED | OUTPATIENT
Start: 2022-01-01 | End: 2022-01-01 | Stop reason: CLARIF

## 2022-01-01 RX ORDER — GUAIFENESIN 600 MG/1
600 TABLET, EXTENDED RELEASE ORAL 2 TIMES DAILY
Status: DISCONTINUED | OUTPATIENT
Start: 2022-01-01 | End: 2022-01-01 | Stop reason: HOSPADM

## 2022-01-01 RX ORDER — PANTOPRAZOLE SODIUM 40 MG/1
40 TABLET, DELAYED RELEASE ORAL
Status: DISCONTINUED | OUTPATIENT
Start: 2022-01-01 | End: 2022-01-01 | Stop reason: HOSPADM

## 2022-01-01 RX ORDER — SODIUM CHLORIDE 9 MG/ML
INJECTION, SOLUTION INTRAVENOUS PRN
Status: DISCONTINUED | OUTPATIENT
Start: 2022-01-01 | End: 2022-01-01 | Stop reason: HOSPADM

## 2022-01-01 RX ORDER — GUAIFENESIN 400 MG/1
400 TABLET ORAL 4 TIMES DAILY PRN
Status: DISCONTINUED | OUTPATIENT
Start: 2022-01-01 | End: 2022-01-01

## 2022-01-01 RX ORDER — ASPIRIN 81 MG/1
81 TABLET ORAL DAILY
Status: DISCONTINUED | OUTPATIENT
Start: 2022-01-01 | End: 2022-01-01 | Stop reason: HOSPADM

## 2022-01-01 RX ORDER — PROCHLORPERAZINE EDISYLATE 5 MG/ML
10 INJECTION INTRAMUSCULAR; INTRAVENOUS EVERY 6 HOURS PRN
Status: DISCONTINUED | OUTPATIENT
Start: 2022-01-01 | End: 2022-01-01 | Stop reason: HOSPADM

## 2022-01-01 RX ORDER — PRIMIDONE 50 MG/1
50 TABLET ORAL 2 TIMES DAILY
Status: DISCONTINUED | OUTPATIENT
Start: 2022-01-01 | End: 2022-01-01 | Stop reason: HOSPADM

## 2022-01-01 RX ORDER — SODIUM CHLORIDE, SODIUM LACTATE, POTASSIUM CHLORIDE, CALCIUM CHLORIDE 600; 310; 30; 20 MG/100ML; MG/100ML; MG/100ML; MG/100ML
INJECTION, SOLUTION INTRAVENOUS
Status: DISPENSED
Start: 2022-01-01 | End: 2022-01-01

## 2022-01-01 RX ORDER — ACETAMINOPHEN 325 MG/1
650 TABLET ORAL EVERY 6 HOURS PRN
Status: DISCONTINUED | OUTPATIENT
Start: 2022-01-01 | End: 2022-01-01 | Stop reason: HOSPADM

## 2022-01-01 RX ORDER — PRIMIDONE 50 MG/1
50 TABLET ORAL
COMMUNITY

## 2022-01-01 RX ORDER — SODIUM CHLORIDE, SODIUM LACTATE, POTASSIUM CHLORIDE, AND CALCIUM CHLORIDE .6; .31; .03; .02 G/100ML; G/100ML; G/100ML; G/100ML
1000 INJECTION, SOLUTION INTRAVENOUS ONCE
Status: COMPLETED | OUTPATIENT
Start: 2022-01-01 | End: 2022-01-01

## 2022-01-01 RX ORDER — DOXYCYCLINE HYCLATE 100 MG/1
100 CAPSULE ORAL EVERY 12 HOURS SCHEDULED
Status: DISCONTINUED | OUTPATIENT
Start: 2022-01-01 | End: 2022-01-01

## 2022-01-01 RX ORDER — ALBUTEROL SULFATE 2.5 MG/3ML
2.5 SOLUTION RESPIRATORY (INHALATION) EVERY 6 HOURS PRN
Status: DISCONTINUED | OUTPATIENT
Start: 2022-01-01 | End: 2022-01-01 | Stop reason: HOSPADM

## 2022-01-01 RX ORDER — HEPARIN SODIUM (PORCINE) LOCK FLUSH IV SOLN 100 UNIT/ML 100 UNIT/ML
1 SOLUTION INTRAVENOUS EVERY 12 HOURS SCHEDULED
Status: DISCONTINUED | OUTPATIENT
Start: 2022-01-01 | End: 2022-01-01 | Stop reason: HOSPADM

## 2022-01-01 RX ORDER — MECOBALAMIN 5000 MCG
5 TABLET,DISINTEGRATING ORAL NIGHTLY
Status: DISCONTINUED | OUTPATIENT
Start: 2022-01-01 | End: 2022-01-01 | Stop reason: HOSPADM

## 2022-01-01 RX ORDER — HEPARIN SODIUM (PORCINE) LOCK FLUSH IV SOLN 100 UNIT/ML 100 UNIT/ML
1 SOLUTION INTRAVENOUS PRN
Status: DISCONTINUED | OUTPATIENT
Start: 2022-01-01 | End: 2022-01-01 | Stop reason: HOSPADM

## 2022-01-01 RX ORDER — FUROSEMIDE 10 MG/ML
40 INJECTION INTRAMUSCULAR; INTRAVENOUS ONCE
Status: COMPLETED | OUTPATIENT
Start: 2022-01-01 | End: 2022-01-01

## 2022-01-01 RX ORDER — GUAIFENESIN 100 MG/5ML
200 SOLUTION ORAL DAILY
COMMUNITY

## 2022-01-01 RX ORDER — ONDANSETRON 2 MG/ML
4 INJECTION INTRAMUSCULAR; INTRAVENOUS EVERY 6 HOURS PRN
Status: DISCONTINUED | OUTPATIENT
Start: 2022-01-01 | End: 2022-01-01

## 2022-01-01 RX ORDER — DONEPEZIL HYDROCHLORIDE 10 MG/1
10 TABLET, FILM COATED ORAL NIGHTLY
Status: DISCONTINUED | OUTPATIENT
Start: 2022-01-01 | End: 2022-01-01 | Stop reason: HOSPADM

## 2022-01-01 RX ORDER — FUROSEMIDE 20 MG/1
20 TABLET ORAL DAILY PRN
Qty: 30 TABLET | Refills: 0 | Status: SHIPPED | OUTPATIENT
Start: 2022-01-01

## 2022-01-01 RX ORDER — ASPIRIN 81 MG/1
81 TABLET ORAL NIGHTLY
COMMUNITY

## 2022-01-01 RX ORDER — SODIUM CHLORIDE 9 MG/ML
25 INJECTION, SOLUTION INTRAVENOUS PRN
Status: DISCONTINUED | OUTPATIENT
Start: 2022-01-01 | End: 2022-01-01 | Stop reason: HOSPADM

## 2022-01-01 RX ORDER — IPRATROPIUM BROMIDE AND ALBUTEROL SULFATE 2.5; .5 MG/3ML; MG/3ML
3 SOLUTION RESPIRATORY (INHALATION) ONCE
Status: COMPLETED | OUTPATIENT
Start: 2022-01-01 | End: 2022-01-01

## 2022-01-01 RX ADMIN — ENOXAPARIN SODIUM 40 MG: 100 INJECTION SUBCUTANEOUS at 09:15

## 2022-01-01 RX ADMIN — DOXYCYCLINE HYCLATE 100 MG: 100 CAPSULE ORAL at 21:47

## 2022-01-01 RX ADMIN — Medication 10 ML: at 21:33

## 2022-01-01 RX ADMIN — PRIMIDONE 50 MG: 50 TABLET ORAL at 21:33

## 2022-01-01 RX ADMIN — DOXYCYCLINE HYCLATE 100 MG: 100 CAPSULE ORAL at 09:09

## 2022-01-01 RX ADMIN — DONEPEZIL HYDROCHLORIDE 10 MG: 10 TABLET, FILM COATED ORAL at 21:51

## 2022-01-01 RX ADMIN — PANTOPRAZOLE SODIUM 40 MG: 40 TABLET, DELAYED RELEASE ORAL at 06:24

## 2022-01-01 RX ADMIN — DONEPEZIL HYDROCHLORIDE 10 MG: 10 TABLET, FILM COATED ORAL at 21:47

## 2022-01-01 RX ADMIN — PRIMIDONE 50 MG: 50 TABLET ORAL at 09:59

## 2022-01-01 RX ADMIN — Medication 5 MG: at 21:33

## 2022-01-01 RX ADMIN — Medication 10 ML: at 09:15

## 2022-01-01 RX ADMIN — GUAIFENESIN 600 MG: 600 TABLET, EXTENDED RELEASE ORAL at 21:50

## 2022-01-01 RX ADMIN — ENOXAPARIN SODIUM 40 MG: 100 INJECTION SUBCUTANEOUS at 09:09

## 2022-01-01 RX ADMIN — MEROPENEM 1000 MG: 1 INJECTION, POWDER, FOR SOLUTION INTRAVENOUS at 08:29

## 2022-01-01 RX ADMIN — HEPARIN 100 UNITS: 100 SYRINGE at 10:04

## 2022-01-01 RX ADMIN — ENOXAPARIN SODIUM 40 MG: 100 INJECTION SUBCUTANEOUS at 17:16

## 2022-01-01 RX ADMIN — WATER 1000 MG: 1 INJECTION INTRAMUSCULAR; INTRAVENOUS; SUBCUTANEOUS at 13:49

## 2022-01-01 RX ADMIN — SERTRALINE 25 MG: 50 TABLET, FILM COATED ORAL at 09:58

## 2022-01-01 RX ADMIN — PRAVASTATIN SODIUM 20 MG: 20 TABLET ORAL at 09:16

## 2022-01-01 RX ADMIN — POLYETHYLENE GLYCOL 3350 17 G: 17 POWDER, FOR SOLUTION ORAL at 10:03

## 2022-01-01 RX ADMIN — POLYETHYLENE GLYCOL 3350 17 G: 17 POWDER, FOR SOLUTION ORAL at 09:16

## 2022-01-01 RX ADMIN — CARBIDOPA AND LEVODOPA 1 TABLET: 25; 100 TABLET ORAL at 14:05

## 2022-01-01 RX ADMIN — PRAVASTATIN SODIUM 20 MG: 20 TABLET ORAL at 17:14

## 2022-01-01 RX ADMIN — HEPARIN 100 UNITS: 100 SYRINGE at 21:34

## 2022-01-01 RX ADMIN — PRAVASTATIN SODIUM 20 MG: 20 TABLET ORAL at 09:09

## 2022-01-01 RX ADMIN — CARBIDOPA AND LEVODOPA 1 TABLET: 25; 100 TABLET ORAL at 09:10

## 2022-01-01 RX ADMIN — SERTRALINE 25 MG: 50 TABLET, FILM COATED ORAL at 09:16

## 2022-01-01 RX ADMIN — CARBIDOPA AND LEVODOPA 1 TABLET: 25; 100 TABLET ORAL at 09:16

## 2022-01-01 RX ADMIN — ENOXAPARIN SODIUM 40 MG: 100 INJECTION SUBCUTANEOUS at 10:03

## 2022-01-01 RX ADMIN — ASPIRIN 81 MG: 81 TABLET, COATED ORAL at 09:16

## 2022-01-01 RX ADMIN — Medication 5 MG: at 21:47

## 2022-01-01 RX ADMIN — POLYETHYLENE GLYCOL 3350 17 G: 17 POWDER, FOR SOLUTION ORAL at 17:17

## 2022-01-01 RX ADMIN — DOXYCYCLINE HYCLATE 100 MG: 100 CAPSULE ORAL at 21:33

## 2022-01-01 RX ADMIN — GUAIFENESIN 600 MG: 600 TABLET, EXTENDED RELEASE ORAL at 09:17

## 2022-01-01 RX ADMIN — ACETAMINOPHEN 650 MG: 325 TABLET ORAL at 21:50

## 2022-01-01 RX ADMIN — ASPIRIN 81 MG: 81 TABLET, COATED ORAL at 09:59

## 2022-01-01 RX ADMIN — CARBIDOPA AND LEVODOPA 1 TABLET: 25; 100 TABLET ORAL at 10:00

## 2022-01-01 RX ADMIN — DOXYCYCLINE HYCLATE 100 MG: 100 CAPSULE ORAL at 09:15

## 2022-01-01 RX ADMIN — ASPIRIN 81 MG: 81 TABLET, COATED ORAL at 09:10

## 2022-01-01 RX ADMIN — DOXYCYCLINE HYCLATE 100 MG: 100 CAPSULE ORAL at 21:51

## 2022-01-01 RX ADMIN — Medication 5 MG: at 21:50

## 2022-01-01 RX ADMIN — IOPAMIDOL 75 ML: 755 INJECTION, SOLUTION INTRAVENOUS at 09:41

## 2022-01-01 RX ADMIN — Medication 10 ML: at 21:48

## 2022-01-01 RX ADMIN — SODIUM CHLORIDE, POTASSIUM CHLORIDE, SODIUM LACTATE AND CALCIUM CHLORIDE 1000 ML: 600; 310; 30; 20 INJECTION, SOLUTION INTRAVENOUS at 08:25

## 2022-01-01 RX ADMIN — HEPARIN 100 UNITS: 100 SYRINGE at 09:12

## 2022-01-01 RX ADMIN — CARBIDOPA AND LEVODOPA 1 TABLET: 25; 100 TABLET ORAL at 17:14

## 2022-01-01 RX ADMIN — IPRATROPIUM BROMIDE AND ALBUTEROL SULFATE 3 AMPULE: 2.5; .5 SOLUTION RESPIRATORY (INHALATION) at 07:37

## 2022-01-01 RX ADMIN — CARBIDOPA AND LEVODOPA 1 TABLET: 25; 100 TABLET ORAL at 21:50

## 2022-01-01 RX ADMIN — CARBIDOPA AND LEVODOPA 1 TABLET: 25; 100 TABLET ORAL at 21:47

## 2022-01-01 RX ADMIN — Medication 10 ML: at 09:12

## 2022-01-01 RX ADMIN — DOXYCYCLINE HYCLATE 100 MG: 100 CAPSULE ORAL at 09:58

## 2022-01-01 RX ADMIN — PRIMIDONE 50 MG: 50 TABLET ORAL at 09:10

## 2022-01-01 RX ADMIN — Medication 10 ML: at 10:38

## 2022-01-01 RX ADMIN — DONEPEZIL HYDROCHLORIDE 10 MG: 10 TABLET, FILM COATED ORAL at 21:33

## 2022-01-01 RX ADMIN — GUAIFENESIN 600 MG: 600 TABLET, EXTENDED RELEASE ORAL at 16:00

## 2022-01-01 RX ADMIN — HEPARIN 100 UNITS: 100 SYRINGE at 21:47

## 2022-01-01 RX ADMIN — PRIMIDONE 50 MG: 50 TABLET ORAL at 17:15

## 2022-01-01 RX ADMIN — WATER 1000 MG: 1 INJECTION INTRAMUSCULAR; INTRAVENOUS; SUBCUTANEOUS at 14:05

## 2022-01-01 RX ADMIN — PRIMIDONE 50 MG: 50 TABLET ORAL at 21:50

## 2022-01-01 RX ADMIN — SERTRALINE 25 MG: 50 TABLET, FILM COATED ORAL at 17:14

## 2022-01-01 RX ADMIN — WATER 1000 MG: 1 INJECTION INTRAMUSCULAR; INTRAVENOUS; SUBCUTANEOUS at 17:21

## 2022-01-01 RX ADMIN — CARBIDOPA AND LEVODOPA 1 TABLET: 25; 100 TABLET ORAL at 21:33

## 2022-01-01 RX ADMIN — CARBIDOPA AND LEVODOPA 1 TABLET: 25; 100 TABLET ORAL at 13:50

## 2022-01-01 RX ADMIN — PRIMIDONE 50 MG: 50 TABLET ORAL at 09:16

## 2022-01-01 RX ADMIN — FUROSEMIDE 40 MG: 40 INJECTION, SOLUTION INTRAMUSCULAR; INTRAVENOUS at 16:00

## 2022-01-01 RX ADMIN — SERTRALINE 25 MG: 50 TABLET, FILM COATED ORAL at 09:09

## 2022-01-01 RX ADMIN — PANTOPRAZOLE SODIUM 40 MG: 40 TABLET, DELAYED RELEASE ORAL at 06:22

## 2022-01-01 RX ADMIN — PRAVASTATIN SODIUM 20 MG: 20 TABLET ORAL at 09:58

## 2022-01-01 RX ADMIN — POLYETHYLENE GLYCOL 3350 17 G: 17 POWDER, FOR SOLUTION ORAL at 09:07

## 2022-01-01 ASSESSMENT — PAIN SCALES - GENERAL
PAINLEVEL_OUTOF10: 0
PAINLEVEL_OUTOF10: 5
PAINLEVEL_OUTOF10: 0

## 2022-01-01 ASSESSMENT — ENCOUNTER SYMPTOMS
ABDOMINAL PAIN: 0
NAUSEA: 0
SINUS PAIN: 0
SINUS PRESSURE: 0
DIARRHEA: 0
COUGH: 1
VOMITING: 0
CONSTIPATION: 0
RHINORRHEA: 0
BACK PAIN: 0
SORE THROAT: 0
EYE REDNESS: 0
CHEST TIGHTNESS: 1
SHORTNESS OF BREATH: 1
EYE PAIN: 0

## 2022-01-01 NOTE — PROGRESS NOTES
INITIAL OFFICE VISIT      Roxane Caballero  2022             There were no vitals taken for this visit.  Weight:         YOUR BABY AT BIRTH TO 2 WEEKS OF AGE    Key points at this age…     • Breast milk is the best nutrition.    • Car seats save lives--make sure to install and use them correctly!    • “Back to sleep” is recommended--it’s the safest sleeping position for your young infant!    FEEDING: Breast milk is the best food for Roxane at this age; it is beneficial in many ways to both babies and moms! It can be challenging early on, so ask for help from your doctors and the lactation specialists at the hospital. Breastfeeding moms should make sure their doctors know about any medications they are taking.     If you are bottle feeding, make sure to prepare formula exactly as directed (standard formula preparation is one scoop of formula in 2 ounces of water). You should hold your baby upright (not lying down) while feeding and never prop the bottle so that it stays in your baby’s mouth without you holding it (this can cause dangerous choking episodes as well as ear infections).     ABOUT VITAMIN D: All babies ( or formula fed) need extra vitamin D. Vitamin D is very important for bone health (especially preventing rickets) as well as other important health aspects. Breast milk is the “perfect” food except that it does not contain enough vitamin D for your baby. Formula has some vitamin D in it, but your baby needs extra until they are taking ~32-33 ounces of formula daily. We recommend 400 IU of vitamin D once daily. Vitamin D drops are available in two forms-- a concentrated drop which provides the full dose in one DROP or a liquid product which is dosed at one ml (or one cc-- the syringes provided with the drops are labeled with the correct dose). Make sure to follow the instructions on the bottle exactly for proper dosing! A  baby should continue this for  Physical Therapy    PT orders received and chart reviewed to attempt eval. Pt is pending L hip hemiarthroplasty for management of displaced L femoral neck fracture s/p fall prior to admission. PT will follow and attempt again at later time/date as appropriate post-op. Thank you.     Michael Bruno, PT, DPT  [id number] as long as they are primarily breastfeeding; by the time a formula-fed baby goes through one bottle of vitamin D drops they are likely getting enough vitamin D in their formula.     BEHAVIOR & CRYING: Touch and hold and carry your baby as often as you can. Cuddling, caressing, talking and singing to your baby makes them feel secure and loved. You will not spoil them when they are this young! Be sure to focus on them when you are feeding them; this is an important time for bonding and social development (don't be distracted by your cell phone or computer-- focus on your baby!). It’s also important to remember that other children in the house may be jealous of the new baby, and sometimes they may act out to get your attention. Try to set aside short periods of time every day devoted exclusively to your older child (or children). Also, try to find simple ways that they can help care for the baby-- this will help them connect better.      CAR SAFETY:  Your baby should be secured in a rear-facing infant car safety seat whenever riding in the car for at least the first year of life.This will protect your baby in case of an accident, plus it is the law. When choosing a car seat, you can check on any safety concerns for a particular model at www.safercar.gov. You can also search for local inspection stations on that site. (Or at www.safekidswi.org.) Correct installation of the car seat is critical (at least 7 out of 10 car seats are estimated to be incorrectly installed or used!). A certified car seat  can ensure your baby is as safely secured as possible. You should also register your car seat with the , so that you will be notified of any future problems or recalls with that seat. The straps need to be very snug to protect your baby in case of an accident (so no thick coats or snowsuits while riding in the car during the winter!). Never leave a child alone in a car, even for a very short  time.      SAFE SLEEPING: The safest place for a  to sleep is in their own crib in their parent’s room; this is recommended until at least 6 months of age. They should always be placed on their back to sleep (not on their tummy or their side). This “back to sleep” position decreases the risk of crib death (SIDS). Avoid loose, soft bedding (blankets, comforters, sheepskins, quilts, pillows, pillow-like bumper pads) and soft toys in the baby’s crib because they are a risk for suffocation (and SIDS). “Sleep sacks” and swaddling with thin blankets are okay. Cribs (including Pack-n-Plays) should be certified by [facility] (a safety agency for baby products). Safety criteria for cribs include: slats or bars no more than 2-3/8 inches (6cm) apart, a snug-fitting mattress, and a distance of no less than 26 inches from the mattress to the top rail. Sleeping in or with other devices (car seats, swings, bed sharing devices, cushions) are not recommended; likewise, sleeping on sofas or in armchairs is very dangerous for small infants.     TUMMY TIME: It is safe to start “tummy time” as soon as you bring your baby home. (Laying on your chest or across your lap counts!) It should always be done when your baby is awake. Start with a few minutes at a time, and increase it as your baby grows older. (Never leave your baby alone on their tummy.) Tummy time strengthens their neck and shoulder muscles and helps with their development. (It also prevents lopsided or flat heads that come from lying in the same position on their back all the time.)    UMBILICAL CORD CARE: The umbilical cord stump and the area around it should be left completely dry until the cord  stump falls off. (Washing it or using alcohol wipes will just delay how long it takes to fall off.) Keep it open to air as much as possible. Give your baby sponge baths early on; don’t put them in a tub with their belly button underwater until the cord has fallen off and the base  of it appears dry. A tiny bit of oozing blood when the cord falls off is normal.     OTHER SAFETY GUIDELINES:  Burns:  Adjust your hot-water heater to 120-125°F or use the “low” setting. This will decrease the risk of scald burns (and save money on your utility bills!).     Smoke and carbon monoxide detectors: Make sure that detectors are on each level of your home, especially near sleeping areas. Check the batteries regularly and replace them at least twice a year. Discuss a “fire escape plan” with all family members.     No smoking! Exposure to tobacco smoke puts children at risk for lots of problems (asthma and other severe breathing problems, crib death [SIDS], ear infections and even behavior and learning problems). If you smoke, this is the time to talk to your doctor about quitting. If you can’t quit, keep all smoking (including e-cigarettes and vaping) outside the home (not just in another room), and all smokers should change their clothes and wash their hands before handling the baby.     Tap water is safe! Tap water is safe for formula preparation. News reports in 2016 raised concerns about a risk of lead in the [place] city water supply. This water supply is very safe--there is no lead in the water that leaves the local water treatment center. In some older homes (built in 1951), there may be lead in the service pipe lines (which carry water from the main water pipe to individual homes). When water sits in these pipes for prolonged periods, some lead may dissolve into the water. As a precaution, the [facility] [place][note 2] recommends a water filter at the tap of homes which have lead service lines. If you are not sure when your home was built, do an internet search for \"[place] lead awareness and drinking water safety\". From this web page, you can search for your address to find out if your home was built with lead service lines. There are also helpful hints regarding water safety on this site. Another  option is to call the Customer Service line at (517) 593-3083. If it would make you feel better, you could also get a filter for your faucet or tap. This would help save you money (and the environment--which is going to be important to your baby's future!).     OTHER  ISSUES:   Body temperature: A rectal temperature is most accurate way to check for fever in this age group. Normal rectal temperatures range between 97.9 and 100.3°F. A fever is defined as a rectal temperature of 100.4°F or higher. Call the doctor or have Roxane seen if they have a fever. In a baby this young, a fever needs to be evaluated. Do not give Tylenol or ibuprofen at this age.     Bowel movements:  Once your baby is feeding well, how often they poop may vary from once every feeding to once every three to four days ( babies often poop less than once a day).  It is normal for babies to strain and turn a little red in the face with bowel movements, as long as the stool they pass is soft.  If you feel Roxane is very uncomfortable, call the clinic. Blood in the stool may be normal early on in nursing infants, but you should call us to discuss it if you do see any blood.    Most Recent Immunizations   Administered Date(s) Administered   • Hep B, adolescent or pediatric 2022   • Hepatitis B Immune Globulin 2022          Follow up visits: After the  period, we usually recommend your baby be seen at 2 weeks of age, and then at two months of age. Of course, we will see you anytime for any concerns and to follow up on any problems.     Thank you for entrusting your care to [facility]!    Also, check out “Children’s Health” on the [facility] Blog for updates on timely topics regarding children’s health!

## 2022-01-25 NOTE — TELEPHONE ENCOUNTER
Phone call from patient daughter Pallavi Herrera    Asking if you will contact her? Unsure why patient is taking Eliquis and Carbidopa-Levodopa? Asking if you will advise?     Pallavi Herrera [phone]

## 2022-02-15 NOTE — TELEPHONE ENCOUNTER
Pc from [facility] states they D'cd pt's Eliquis and wanted to know if it was ok to resume ASA 81mg. Informed Kelli Fine per Dr Too Pedro it was ok to do

## 2022-02-23 PROBLEM — J96.00 ACUTE RESPIRATORY FAILURE (HCC): Status: ACTIVE | Noted: 2022-01-01

## 2022-02-23 NOTE — ED NOTES
Pt remains alert to cue easily agitated and redirected by family side rails up call light near nop pain at rest no dyspnea at rest telemetry remains AFIB CVR      Charles Parker RN  02/23/22 1037

## 2022-02-23 NOTE — ED NOTES
Pt came in with shortness of breath, wheezing and chest pain that became worse last night. Daughter states he has been using mucinex for a while, but it does not seem to be clearing anything up. She states she is unsure if he had any fevers, but he did feel warm yesterday.  Blood work was obtained and report was given to Olive at the bedside     [facility]  02/23/22 [id number]

## 2022-02-23 NOTE — ED NOTES
Pt condition unchanged bno dyspnea at rest Monitor NSR 70-80 side rails up call light near cap refill brisk pulses intact all extremities Family at bedside updated regarding plan of care and admission      [facility] [place]  02/23/22 7843

## 2022-02-23 NOTE — ED PROVIDER NOTES
[address] [facility]  Department of Emergency Medicine     Written by: Ethan Cortes DO  Patient Name: Russell Austin  Attending Provider: Susu Reid MD  Admit Date: 2022  6:42 AM  MRN: [id number]                   : 1925        Chief Complaint   Patient presents with    Shortness of Breath     pt comes in for shortness of breath and low pulse ox at home - family states he has been taking mucinex at home and was wheezing last night, as well as complaining of chest pain    - Chief complaint    Mr. Jean-Paul Pak is a 81 yo male who presents to the ED due to shortness of breath. Patient presents with his daughter who states that patient has been progressively more short of breath over the past 2 days. She states that they have been giving him Mucinex without relief of his symptoms. They do pulse oximeter at home and he was registering 79% at home and once arriving to the emergency department he was 85% on room air. Patient was initially placed on 4 L of oxygen and was saturating appropriately via nasal cannula. They note that he has been wheezing at home. He has had a slight dry cough as well over the past few days. Patient is vaccinated for Covid and did receive a booster dose as well. Symptoms have been constant since onset. Patient also notes some midsternal chest tightness that has been present since the onset of his symptoms. Symptoms are aggravated with any sort of exertion and only minimally relieved with rest.  Family denies any fever, chills, nausea, vomiting, abdominal pain, bowel or urinary changes, headaches or vision changes. Review of Systems   Constitutional: Positive for fatigue. Negative for chills and fever. HENT: Negative for congestion, rhinorrhea, sinus pressure, sinus pain, sneezing and sore throat. Eyes: Negative for pain, redness and visual disturbance. Respiratory: Positive for cough, chest tightness and shortness of breath.     Cardiovascular: Positive for chest pain. Negative for palpitations and leg swelling. Gastrointestinal: Negative for abdominal pain, constipation, diarrhea, nausea and vomiting. Genitourinary: Negative for dysuria, flank pain, frequency, hematuria and urgency. Musculoskeletal: Negative for arthralgias, back pain, gait problem, joint swelling and myalgias. Skin: Negative for rash. Neurological: Negative for dizziness, tremors, seizures, syncope, weakness, light-headedness, numbness and headaches. Physical Exam  Constitutional:       General: He is not in acute distress. Appearance: Normal appearance. He is normal weight. He is ill-appearing. HENT:      Head: Normocephalic and atraumatic. Right Ear: External ear normal.      Left Ear: External ear normal.      Mouth/Throat:      Mouth: Mucous membranes are moist.      Pharynx: Oropharynx is clear. Eyes:      Extraocular Movements: Extraocular movements intact. Conjunctiva/sclera: Conjunctivae normal.   Cardiovascular:      Rate and Rhythm: Normal rate and regular rhythm. Pulses: Normal pulses. Heart sounds: Normal heart sounds. Pulmonary:      Effort: Pulmonary effort is normal. No tachypnea or respiratory distress. Breath sounds: Decreased breath sounds and wheezing present. Abdominal:      General: Abdomen is flat. Bowel sounds are normal.      Palpations: Abdomen is soft. Tenderness: There is no guarding or rebound. Musculoskeletal:         General: Normal range of motion. Cervical back: Normal range of motion and neck supple. Right lower leg: No edema. Left lower leg: No edema. Skin:     General: Skin is warm and dry. Neurological:      General: No focal deficit present. Mental Status: He is alert and oriented to person, place, and time. Mental status is at baseline. Motor: No weakness.    Psychiatric:         Mood and Affect: Mood normal.         Behavior: Behavior normal.          Procedures MDM  Number of Diagnoses or Management Options  Acute congestive heart failure, unspecified heart failure type ([address]  Acute respiratory failure with hypoxia (HCC)  Pneumonia of right lower lobe due to infectious organism  Diagnosis management comments: This is a 81 yo male who presents to the ED due to shortness of breath. Patient was given 1 L LR and 3 DuoNeb treatments initially. Patient's EKG revealed atrial fibrillation with a rate of 79 without any acute interval or ST segment changes. Patient CBC revealed leukopenia with a white blood cell count of 3.4 and anemia with a hemoglobin 8.7 which appears in line with his prior values. BMP was markedly benign within normal limits. Pack function panel was also relatively benign. Lipase and lactic acid were normal.  BNP was elevated at 3363. Initial ABG revealed pH of 7.442 with PCO2 36.1 and a PO2 of 74. Patient's pro calcitonin was normal.  Initial troponin was 24 with a repeat of 25. Chest x-ray revealed significant multifocal bilateral airspace disease more prominent within the right lung with a small right pleural effusion and mild cardiomegaly. CTA revealed no evidence of pulmonary embolism or acute pulmonary abnormality with a large right pleural effusion and moderate left suprahilar effusion with findings most likely representing pulmonary edema/CHF with centrilobular emphysema identified. Given patient was initially hypoxic and is currently required oxygen and has a possible pneumonia he will be started on meropenem and admitted to the hospital by Dr. Lizzette Moreau for further work-up and treatment at this time.          Amount and/or Complexity of Data Reviewed  Clinical lab tests: reviewed  Tests in the radiology section of CPT®: reviewed  Tests in the medicine section of CPT®: reviewed  Decide to obtain previous medical records or to obtain history from someone other than the patient: yes              --------------------------------------------- 0.6 0.0 - 6.0 %    Basophils % 0.3 0.0 - 2.0 %    Neutrophils Absolute 1.89 1.80 - 7.30 E9/L    Immature Granulocytes # 0.03 E9/L    Lymphocytes Absolute 1.08 (L) 1.50 - 4.00 E9/L    Monocytes Absolute 0.37 0.10 - 0.95 E9/L    Eosinophils Absolute 0.02 (L) 0.05 - 0.50 E9/L    Basophils Absolute 0.01 0.00 - 0.20 E9/L   Brain Natriuretic Peptide   Result Value Ref Range    Pro-BNP 3,363 (H) 0 - 450 pg/mL   Basic Metabolic Panel w/ Reflex to MG   Result Value Ref Range    Sodium 133 132 - 146 mmol/L    Potassium reflex Magnesium 4.8 3.5 - 5.0 mmol/L    Chloride 97 (L) 98 - 107 mmol/L    CO2 23 22 - 29 mmol/L    Anion Gap 13 7 - 16 mmol/L    Glucose 108 (H) 74 - 99 mg/dL    BUN 18 6 - 23 mg/dL    CREATININE 0.9 0.7 - 1.2 mg/dL    GFR Non-African American >60 >=60 mL/min/1.73    GFR African American >60     Calcium 9.1 8.6 - 10.2 mg/dL   Hepatic Function Panel   Result Value Ref Range    Total Protein 8.7 (H) 6.4 - 8.3 g/dL    Albumin 3.9 3.5 - 5.2 g/dL    Alkaline Phosphatase 128 40 - 129 U/L    ALT 9 0 - 40 U/L    AST 48 (H) 0 - 39 U/L    Total Bilirubin 0.4 0.0 - 1.2 mg/dL    Bilirubin, Direct <0.2 0.0 - 0.3 mg/dL    Bilirubin, Indirect see below 0.0 - 1.0 mg/dL   Lipase   Result Value Ref Range    Lipase 32 13 - 60 U/L   Lactate, Sepsis   Result Value Ref Range    Lactic Acid, Sepsis 1.5 0.5 - 1.9 mmol/L   Blood Gas, Arterial   Result Value Ref Range    Date Analyzed [id number]     Time Analyzed 0731     Source: Blood Arterial     pH, Blood Gas 7.442 7.350 - 7.450    PCO2 36.1 35.0 - 45.0 mmHg    PO2 74.0 (L) 75.0 - 100.0 mmHg    HCO3 24.1 22.0 - 26.0 mmol/L    B.E. 0.1 -3.0 - 3.0 mmol/L    O2 Sat 94.3 92.0 - 98.5 %    O2Hb 93.5 (L) 94.0 - 97.0 %    COHb 0.4 0.0 - 1.5 %    MetHb 0.4 0.0 - 1.5 %    O2 Content 11.8 mL/dL    HHb 5.7 (H) 0.0 - 5.0 %    tHb (est) 8.9 (L) 11.5 - 16.5 g/dL    Mode NC- 4 L     Date Of Collection      Time Collected      Pt Temp 37.0 C     ID 03     Lab [id number]     Critical(s) Notified . No Critical Values    SPECIMEN REJECTION   Result Value Ref Range    Rejected Test trp5     Reason for Rejection see below    Troponin   Result Value Ref Range    Troponin, High Sensitivity 24 (H) 0 - 11 ng/L   EKG 12 Lead   Result Value Ref Range    Ventricular Rate 79 BPM    Atrial Rate 61 BPM    QRS Duration 140 ms    Q-T Interval 422 ms    QTc Calculation (Bazett) 483 ms    R Axis 66 degrees    T Axis 19 degrees       RADIOLOGY:  CTA PULMONARY W CONTRAST   Final Result   1. No evidence of pulmonary embolism or acute pulmonary abnormality. 2.  Large right pleural effusion. Moderate-sized left pleural effusion. 3.  Findings most likely representing pulmonary edema/CHF, as described above. 4.  Centrilobular emphysema again identified, when compared to the previous   CTA performed 10/13/2021. XR CHEST PORTABLE   Final Result   1. Significant multifocal bilateral airspace disease more prominent within   the right lung. 2. Small right pleural effusion   3. Mild cardiomegaly             EKG:  This EKG is signed and interpreted by me. Rate: 79  Rhythm: Atrial fibrillation  Interpretation: atrial fibrillation (chronic)  Comparison: stable as compared to patient's most recent EKG      ------------------------- NURSING NOTES AND VITALS REVIEWED ---------------------------  Date / Time Roomed:  2/23/2022  6:42 AM  ED Bed Assignment:  08/08    The nursing notes within the ED encounter and vital signs as below have been reviewed.      Patient Vitals for the past 24 hrs:   BP Temp Temp src Pulse Resp SpO2 Weight   02/23/22 0823 (!) 153/81 -- -- 123 21 -- --   02/23/22 0651 -- -- -- -- -- 99 % 135 lb 12.8 oz (61.6 kg)   02/23/22 0649 132/72 97.8 °F (36.6 °C) Oral 87 18 (!) 84 % --       Oxygen Saturation Interpretation: Abnormal and Improved after treatment    ------------------------------------------ PROGRESS NOTES ------------------------------------------  Re-evaluation(s):  Time: 1211 Patients symptoms show no change  Repeat physical examination is not changed    Counseling:  I have spoken with the patient and discussed todays results, in addition to providing specific details for the plan of care and counseling regarding the diagnosis and prognosis. Their questions are answered at this time and they are agreeable with the plan of admission.    --------------------------------- ADDITIONAL PROVIDER NOTES ---------------------------------  Consultations:  Time: 1230. Spoke with Dr. Brandy Luna. Discussed case. They will admit the patient. This patient's ED course included: a personal history and physicial examination, re-evaluation prior to disposition, multiple bedside re-evaluations, IV medications, cardiac monitoring, continuous pulse oximetry and complex medical decision making and emergency management    This patient has remained hemodynamically stable during their ED course. Diagnosis:  1. Acute respiratory failure with hypoxia ([address]    2. Acute congestive heart failure, unspecified heart failure type ([address]    3. Pneumonia of right lower lobe due to infectious organism        Disposition:  Patient's disposition: Admit to intermediate  Patient's condition is stable. Patient was seen and evaluated by myself and my attending Antolin Nelson MD. Assessment and Plan discussed with attending provider, please see attestation for final plan of care.      DO Erin Zheng DO  Resident  02/23/22 3907

## 2022-02-23 NOTE — PROGRESS NOTES
SPEECH/LANGUAGE PATHOLOGY  CLINICAL ASSESSMENT OF SWALLOWING FUNCTION   and PLAN OF CARE    PATIENT NAME:  Roc Mora  (male)     MRN:  [id number]    :  1925  (80 y.o.)  STATUS:  Inpatient: Room 0623    TODAY'S DATE:  2022  REFERRING PROVIDER:  SLP eval and treat Start: 22 1230, End: 22 1230, ONE TIME, Standing Count: 1 Occurrences, R   REASON FOR REFERRAL: dysphagia    EVALUATING THERAPIST: EDWAR Dewey                 RESULTS:    DYSPHAGIA DIAGNOSIS:   Clinical indicators of moderate oropharyngeal phase dysphagia       DIET RECOMMENDATIONS:  Pureed consistency solids (IDDSI level 4) with  nectar consistency (mildly thick - IDDSI level 2) liquids     FEEDING RECOMMENDATIONS:     Assistance level:  Stand by assistance is needed during all oral intake, Set-up is required for all oral intake      Compensatory strategies recommended: Small bites/sips and Alternate solids and liquids slow rate of presentation.        Discussed recommendations with nursing and/or faxed report to referring provider: Yes    SPEECH THERAPY  PLAN OF CARE   The dysphagia POC is established based on physician order, dysphagia diagnosis and results of clinical assessment     Skilled SLP intervention for dysphagia management up to 5x per week until goals met, pt plateaus in function and/or discharged from hospital    Conditions Requiring Skilled Therapeutic Intervention for dysphagia:    Throat clearing during PO intake     Specific dysphagia interventions to include:     Training in positioning for improved integrity of swallow  Compensatory strategy training     Specific instructions for next treatment:  development and training of compensatory swallow strategies to improve airway protection and swallow function  Patient Treatment Goals:    Short Term Goals:  Pt will implement identified compensatory swallowing strategies on 90% of opportunities or greater to improve airway protection and swallow function. Pt will participate in ongoing mealtime assessment to provide diet modification and compensatory strategy implementation to minimize risk of aspiration associated with PO intake    Long Term Goals:   Pt will maintain adequate nutrition/hydration via PO intake of the least restrictive oral diet with implementation of safe swallow/ compensatory strategies and decrease signs/symptoms of aspiration to less than 1 x/day. Patient/family Goal:    To return to least restrictive diet. Plan of care discussed with Patient and Family   The Patient did not demonstrate complete understanding of the diagnosis, prognosis and plan of care and Family understand(s) the diagnosis, prognosis and plan of care     Rehabilitation Potential/Prognosis: fair                    ADMITTING DIAGNOSIS: Acute respiratory failure (HCC) [J96.00]  Acute respiratory failure with hypoxia (HCC) [J96.01]  Pneumonia of right lower lobe due to infectious organism [J18.9]  Acute congestive heart failure, unspecified heart failure type ([address] [I50.9]    VISIT DIAGNOSIS:   Visit Diagnoses       Codes    Acute respiratory failure with hypoxia ([address]    -  Primary J96.01    Acute congestive heart failure, unspecified heart failure type ([address]     I50.9           PATIENT REPORT/COMPLAINT: patient not able to accurately report  meal tray present during evaluation     PRIOR LEVEL OF SWALLOW FUNCTION:    PAST HISTORY OF DYSPHAGIA?: yes family present and assisted with intake     Home diet: Pureed consistency solids (IDDSI level 4) with  nectar consistency (mildly thick - IDDSI level 2) liquids  Current Diet Order:  ADULT DIET; Dysphagia - Pureed; Mildly Thick (Nectar)    PROCEDURE:  Consistencies Administered During the Evaluation   Liquids: nectar thick liquid   Solids:  pureed foods      Method of Intake:   straw, spoon  Fed by caregiver/family      Position:   Seated, upright    CLINICAL ASSESSMENT:  Oral Stage:        The oral stage of swallowing was within functional limits for consistencies administered      Pharyngeal Stage:    Throat clearing present during exam with patient frequently expelling secretions that at times were food discolored. Not able to determine if the discoloration was from residual food coating in mouth or not. Family reports this has been ongoing for years. Discussed previous swallow studies that did not demonstrate significant pharyngeal residue that would account for the discoloration of his secretions and this same spitting pattern was present then     Cognition:   Within functional limits for this exam    Oral Peripheral Examination   Adequate lingual/labial strength     Current Respiratory Status    2 liters nasal cannula     Parameters of Speech Production  Respiration:  Adequate for speech production  Quality:   Within functional limits  Intensity: Within functional limits    Volitional Swallow: present     Volitional Cough:   present     Pain: No pain reported. EDUCATION:   The Speech Language Pathologist (SLP) completed education regarding results of evaluation and that intervention is warranted at this time. Learner: Patient and Family  Education: Reviewed results and recommendations of this evaluation  Evaluation of Education:  Needs further instruction    This plan may be re-evaluated and revised as warranted. Evaluation Time includes thorough review of current medical information, gathering information on past medical history/social history and prior level of function, completion of standardized testing/informal observation of tasks, assessment of data and education on plan of care and goals. [x]The admitting diagnosis and active problem list, have been reviewed prior to initiation of this evaluation.         ACTIVE PROBLEM LIST:   Patient Active Problem List   Diagnosis    Sepsis ([facility]    Pneumonia    CAD (coronary artery disease)    Hyperlipidemia    Dysphagia    Subdural hematoma ([facility]    H/O: CVA (cerebrovascular accident)    Essential hypertension    Tremors of nervous system    Dementia ([facility]    Thrombocytopenia ([facility]    Anemia    Urinary retention    Drop attack    Protein-calorie malnutrition, moderate ([facility]    Delirium secondary to multiple medical problems    Postoperative anemia due to acute blood loss    Peptic ulcer disease with hemorrhage    Influenza B    Sepsis secondary to influenza B ([facility]    GI bleed    Multiple skin tears    Closed left hip fracture, initial encounter [facility]    [place] (acute kidney injury) ([facility]    History of left hip hemiarthroplasty    Age-related macular degeneration    Acute respiratory failure ([facility]         CPT code:  96625  bedside swallow klever Shane MSCCC/SLP  Speech Language Pathologist  [id number]

## 2022-02-23 NOTE — ED NOTES
Transferred to   with telemetry accompanied by RN cond stable o2 at 3l nasal     Bhupendra Chopra, [facility]  02/23/22 6728

## 2022-02-23 NOTE — H&P
Department of Internal Medicine  History and Physical    PCP: Dr. Yoel Sweet   Admitting Physician: Dr. Pelon Lamb:  SOB and failure to thrive    HISTORY OF PRESENT ILLNESS:    Orlando Mccain is a 49-year-old gentleman who presented to [address] [place] [address] emergency department accompanied by his daughter and granddaughter. The patient has experienced increasing shortness of breath with overall failure to thrive over the past week. He has an extensive past medical history and has been declining recently. Work-up in the emergency department revealed a large right-sided pleural effusion and a moderate size left pleural effusion. He is currently on nasal cannula oxygen with mild agitation in the setting of dementia. He does not appear infectious in nature and family describes no infectious symptomatology including febrile illness. He was admitted to [facility] in November of last year after suffering a hip fracture. He required temporary skilled nursing facility placement thereafter but has been residing at home with substantial family support. He does not appear overtly uncomfortable on examination. He does exhibit a shallow breathing pattern. We discussed moving forward with thoracentesis and family voiced understanding.     PAST MEDICAL Hx:  Past Medical History:   Diagnosis Date    Anemia     Anxiety     CAD (coronary artery disease)     Candida esophagitis (HCC)     Cerebral artery occlusion with cerebral infarction ([address]     Chronic kidney disease     Hemorrhage     right eye    Hyperlipidemia     Macular degeneration     Parkinson's disease ([address]     Raynaud disease     Tremors of nervous system     Vitamin D deficiency        PAST SURGICAL Hx:   Past Surgical History:   Procedure Laterality Date    CORONARY ANGIOPLASTY WITH STENT PLACEMENT      HIP SURGERY Left 10/14/2021    HIP HEMIARTHROPLASTY performed by Liliana Chacon MD at [facility] ENDOSCOPY N/A 11/13/2019    EGD BIOPSY performed by [facility] Grimes MD [facility] N/A 10/20/2021    EGD ESOPHAGOGASTRODUODENOSCOPY PEG TUBE INSERTION performed by Elaina Quintanilla MD at [address] Hx:  Family History   Problem Relation Age of Onset    Dementia Sister        HOME MEDICATIONS:  Prior to Admission medications    Medication Sig Start Date End Date Taking?  Authorizing Provider   aspirin 81 MG EC tablet Take 81 mg by mouth daily   Yes Historical Provider, MD   tolnaftate (TINACTIN) 1 % powder APPLY A SUFFICIENT AMOUNT EXTERNALLY TWICE A DAY TO FEET 2/9/21   Historical Provider, MD   primidone (MYSOLINE) 50 MG tablet Take 50 mg by mouth 2 times daily    Historical Provider, MD   guaiFENesin 400 MG tablet Take 400 mg by mouth 4 times daily as needed for Cough    Historical Provider, MD   carbidopa-levodopa (SINEMET)  MG per tablet Take 1 tablet by mouth 3 times daily  Patient taking differently: Take 1 tablet by mouth 2 times daily  12/3/21   Shar Quiroz DO   acetaminophen (TYLENOL) 325 MG tablet Take 650 mg by mouth every 6 hours as needed for Pain or Fever    Historical Provider, MD   polyethylene glycol (GLYCOLAX) 17 g packet polyethylene glycol 3350 17 gram/dose oral powder  Patient not taking: Reported on 12/27/2021    Historical Provider, MD   melatonin 5 MG TABS tablet Take 5 mg by mouth nightly    Historical Provider, MD   omeprazole 20 MG EC tablet Take 20 mg by mouth 2 times daily     Historical Provider, MD   metoclopramide (REGLAN) 10 MG tablet Take 10 mg by mouth 3 times daily as needed  Patient not taking: Reported on 12/27/2021    Historical Provider, MD   white petrolatum OINT ointment Apply topically daily as needed  Patient not taking: Reported on 12/27/2021    Historical Provider, MD   sertraline (ZOLOFT) 25 MG tablet Take 25 mg by mouth daily    Historical Provider, MD   [employer] VITAMIN A PO Take 2,400 mcg by mouth Daily Historical Provider, MD   polyethylene glycol (MIRALAX) PACK packet Take 17 g by mouth 2 times daily     Historical Provider, MD   ammonium lactate (LAC-HYDRIN) 12 % lotion Apply topically as needed for Dry Skin Apply topically as needed. Historical Provider, MD   mineral oil-hydrophilic petrolatum (HYDROPHOR) ointment Apply topically as needed for Dry Skin Apply topically as needed. Historical Provider, MD   vitamin D 1000 UNITS CAPS Take by mouth daily     Historical Provider, MD   pravastatin (PRAVACHOL) 40 MG tablet Take 20 mg by mouth daily Take one half tablet at bedtime. Historical Provider, MD   donepezil (ARICEPT) 10 MG tablet Take 10 mg by mouth nightly. Historical Provider, MD       ALLERGIES:  Clindamycin, Ativan [lorazepam], Clindamycin/lincomycin, and Pcn [penicillins]    SOCIAL Hx:  Social History     Socioeconomic History    Marital status:      Spouse name: Not on file    Number of children: Not on file    Years of education: Not on file    Highest education level: Not on file   Occupational History    Not on file   Tobacco Use    Smoking status: Former Smoker     Types: Cigarettes     Quit date: 1976     Years since quittin.2    Smokeless tobacco: Never Used   Vaping Use    Vaping Use: Never used   Substance and Sexual Activity    Alcohol use: Not Currently     Comment:      Drug use: No    Sexual activity: Not Currently   Other Topics Concern    Not on file   Social History Narrative    Not on file     Social Determinants of Health     Financial Resource Strain: Low Risk     Difficulty of Paying Living Expenses: Not hard at all   Food Insecurity: No Food Insecurity    Worried About [address] in the Last Year: Never true    [address] in the Last Year: Never true   Transportation Needs:     Lack of Transportation (Medical): Not on file    Lack of Transportation (Non-Medical):  Not on file   Physical Activity:     Days of Exercise per Week: Not on file    Minutes of Exercise per Session: Not on file   Stress:     Feeling of Stress : Not on file   Social Connections:     Frequency of Communication with Friends and Family: Not on file    Frequency of Social Gatherings with Friends and Family: Not on file    Attends Catholic Services: Not on file    Active Member of [address] [place] [address] or Organizations: Not on file    Attends Club or Organization Meetings: Not on file    Marital Status: Not on file   Intimate Partner Violence:     Fear of Current or Ex-Partner: Not on file    Emotionally Abused: Not on file    Physically Abused: Not on file    Sexually Abused: Not on file   Housing Stability:     Unable to Pay for Housing in the Last Year: Not on file    Number of [place] in the Last Year: Not on file    Unstable Housing in the Last Year: Not on file       ROS:  Limited currently in the setting of the patient's dementia. He sleeps throughout the examination and he is daughter and granddaughter answer most questions. General:   Denies chills, fatigue, fever, malaise, night sweats or weight loss    Psychological:   Denies anxiety, disorientation or hallucinations    ENT:    Denies epistaxis, headaches, vertigo or visual changes    Cardiovascular:   Denies any chest pain, irregular heartbeats, or palpitations. No paroxysmal nocturnal dyspnea. Respiratory:   Admits to shortness of breath. No substantial coughing. Admits to the inability to lie flat. Gastrointestinal:   Admits to decreased oral intake. Denies any overt abdominal pain. Genito-Urinary:    Denies any urgency, frequency, hematuria. Voiding without difficulty. Musculoskeletal:   Denies joint pain, joint stiffness, joint swelling or muscle pain    Neurology:    Chronic neurologic deficits. Derm:    Family describes the sacral wound. Extremities:   Denies any lower extremity swelling or edema.       PHYSICAL EXAM:  VITALS:  Vitals:    02/23/22 1045   BP: (!) 157/84   Pulse: 88   Resp:    Temp:    SpO2: 95%         CONSTITUTIONAL:    Awakens briefly. Exhibits signs of dementia. Does not appear overtly uncomfortable. EYES:    PERRL, EOMI, sclera clear, conjunctiva normal    ENT:    Normocephalic, atraumatic, sinuses nontender on palpation. External ears without lesions. Oral pharynx with moist mucus membranes. Tonsils without erythema or exudates. 4 L of nasal cannula oxygen are in place. NECK:    Supple, symmetrical, trachea midline, no adenopathy, thyroid symmetric, not enlarged and no tenderness, skin normal, no bruits, no JVD    HEMATOLOGIC/LYMPHATICS:    No cervical lymphadenopathy and no supraclavicular lymphadenopathy    LUNGS:    Diminished breath sounds bilaterally particularly at the bases posteriorly. CARDIOVASCULAR:    Normal apical impulse, regular rate and rhythm, normal S1 and S2, no S3 or S4, and systolic murmur. ABDOMEN:    No scars, normal bowel sounds, soft, non-distended, non-tender, no masses palpated, no hepatosplenomegaly, no rebound or guarding elicited on palpation     MUSCULOSKELETAL:    There is no redness, warmth, or swelling of the joints. Full range of motion noted. Motor strength is 5 out of 5 all extremities bilaterally. Tone is normal.    NEUROLOGIC:    Obvious signs of early dementia. He exhibits tremulous behavior upon awakening. SKIN:    Sacral wound. EXTREMITIES:    Peripheral pulses present. No edema, cyanosis, or swelling. OSTEOPATHIC:    Examined in seated and supine positions. Normal thoracic kyphosis and lumbar lordosis. No acute somatic dysfunction.     LABORATORY DATA:  CBC with Differential:    Lab Results   Component Value Date    WBC 3.4 02/23/2022    RBC 3.07 02/23/2022    HGB 8.7 02/23/2022    HCT 29.4 02/23/2022     02/23/2022    MCV 95.8 02/23/2022    MCH 28.3 02/23/2022    MCHC 29.6 02/23/2022    RDW 15.8 02/23/2022    LYMPHOPCT 31.8 02/23/2022    MONOPCT 10.9 02/23/2022    BASOPCT 0.3 02/23/2022    MONOSABS 0.37 02/23/2022    LYMPHSABS 1.08 02/23/2022    EOSABS 0.02 02/23/2022    BASOSABS 0.01 02/23/2022     BMP:    Lab Results   Component Value Date     02/23/2022    K 4.2 02/23/2022    K 4.8 02/23/2022    CL 97 02/23/2022    CO2 23 02/23/2022    BUN 18 02/23/2022    LABALBU 3.9 02/23/2022    CREATININE 0.9 02/23/2022    CALCIUM 9.1 02/23/2022    GFRAA >60 02/23/2022    LABGLOM >60 02/23/2022    GLUCOSE 108 02/23/2022       ASSESSMENT:  1. Acute respiratory failure with hypoxia with suspicion for decompensated diastolic congestive heart failure with findings of bilateral pleural effusions  2. Consideration for community-acquired pneumonia  3. Coronary artery disease  4. History of CVA  5. Hyperlipidemia  6. Parkinson's disease    PLAN:  The patient has become increasingly deconditioned recently at home. This has led to some degree of third spacing and what appears to be decompensated congestive heart failure. 2D echocardiogram will be updated. Secondary to the size and symptomatology related to the pleural effusion, we will move forward with thoracentesis. He is only on baby aspirin at baseline. Otherwise, we will utilize antibiotic therapy as we await final respiratory cultures. Procalcitonin will be assessed. Chronic comorbidities will be monitored and he requires extensive work with the therapy teams. We will maintain a puréed diet and he will work with the therapy teams. His daughter and granddaughter are present at the bedside and updated accordingly. They were valuable in providing information in regards to the patient's past medical history.     Liya Lara DO, D.O., [facility]  12:28 PM  2/23/2022    Electronically signed by Liya Lara DO on 2/23/22 at 12:28 PM EST

## 2022-02-24 NOTE — PROGRESS NOTES
OT SESSION ATTEMPT     Date:2022  Patient Name: Jose Armando Gamino  MRN: [id number]  : 1925  Room: [facility]     Attempted OT session this date:    [] unavailable due to other medical staff currently with pt   [] unavailable per nursing staff recommendation    [] Unavailable per nursing staff secondary to lab / radiology results    [] pt declined due to ____. Benefits of participation in therapy reviewed with pt. [x] off unit-special procedures   [] Other:     Will reattempt OT evaluation and/or treatment at a later time.     [place]  OTR/L; [id number]

## 2022-02-24 NOTE — PROGRESS NOTES
Patient came down to Special Procedures for ultrasound guided right  thoracentesis. Procedure was explained to [address] [place] [address], questions were answered. [id number]  Starting procedure /84  100  12  95% on 2L by nasal canula    0855   Ending procedure /82  100  12  96% on 2L by nasal canula    1690 cc of straw color pleural fluid drained from patient, 2 x 2 and foam tape applied to right back. Patient tolerated procedure    Post procedure chest xray taken    Respiratory came got took specimen for [address]    Specimen sent to lab. Nurse to nurse called spoke with Shagufta Morelos RN    Patient sent back to floor.

## 2022-02-24 NOTE — PROGRESS NOTES
Comprehensive Nutrition Assessment    Type and Reason for Visit:  Initial,Consult    Nutrition Recommendations/Plan: Start Gelatein 20 BID    Nutrition Assessment:  Pt admits w/Acute Respiratory Failure 2/2 SOB, poor po intakes x 1week, PMH of Dementia, CHF, CKD, Parkinsons. Pt tolerating diet w/ >50% intakes, no sign wt changes noted. Will start ONS BID    Estimated Daily Nutrient Needs:  Energy (kcal):  ; Weight Used for Energy Requirements:  Current     Protein (g):  80-90 (x1.3-1.5gm/kg); Weight Used for Protein Requirements:  Current        Fluid (ml/day):  ; Method Used for Fluid Requirements:  1 ml/kcal      Nutrition Related Findings:  A/ox1, abd WDL, no edema, I/O WNL, Thoracentesis 2/2 pleural effusion      Wounds:  Wound Consult Pending (red buttocks and heels: Tx in place per wound nurse)       Current Nutrition Therapies:    ADULT DIET; Dysphagia - Pureed; Mildly Thick (Nectar)  ADULT ORAL NUTRITION SUPPLEMENT; Lunch, Dinner;  Other Oral Supplement; GELATEIN 20    Anthropometric Measures:  · Height: 5' 6\" (167.6 cm)  · Current Body Weight: 139 lb (63 kg) (2/24 bed)     · Usual Body Weight:  (131-145# no methods)     · Ideal Body Weight: 142 lbs; % Ideal Body Weight 97.9 %   · BMI: 22.4  · Adjusted Body Weight:  ; No Adjustment   · BMI Categories: Normal Weight (BMI 22.0 to 24.9) age over 72       Nutrition Diagnosis:   · Inadequate oral intake related to impaired respiratory function as evidenced by poor intake prior to admission,intake 51-75%,swallow study results      Nutrition Interventions:   Food and/or Nutrient Delivery:  Continue Current Diet,Start Oral Nutrition Supplement  Nutrition Education/Counseling:  No recommendation at this time   Coordination of Nutrition Care:  Continue to monitor while inpatient    Goals:  Consume >75% meals /ONS       Nutrition Monitoring and Evaluation:   Behavioral-Environmental Outcomes:  None Identified   Food/Nutrient Intake Outcomes:  Food and Nutrient Intake,Supplement Intake  Physical Signs/Symptoms Outcomes:  Biochemical Data,Chewing or Swallowing,Fluid Status or Edema,Nutrition Focused Physical Findings,Skin,Weight     Discharge Planning:     Too soon to determine     Electronically signed by Franca Hutchinson RD, LD on 2/24/22 at 11:56 AM EST    Contact: [phone]

## 2022-02-24 NOTE — CARE COORDINATION
SOCIAL WORK / DISCHARGE PLANNING:  COVID neg 2/23. Sw spoke with pt's caregiver at bedside, dtr had just left. Pt is cared for by family and hired caregivers at home 24/7. Per caregiver, pt is ambulatory. Hx HHC but she did not recall which one. Hx [address] per medical record. Will need to confirm with family dc plan to return home with previous care giving system. Sw will follow up with family for complete assessment.                  Electronically signed by ANA Agosto on 2/24/2022 at 3:43 PM

## 2022-02-24 NOTE — PROGRESS NOTES
Internal Medicine Progress Note    [facility]    Ronal West. Nicole Martinez., PEARLOGRACIELA Vivas D.O., CARLEY Blackwood, MSN, APRN, NP-C  Rubin He. Caprice Henry, MSN, APRN-CNP     Primary Care Physician: Ashlyn Mccoy DO   Admitting Physician:  Rafaela Najjar, DO  Admission date and time: 2/23/2022  6:42 AM    Room:  23/0623-01  Admitting diagnosis: Acute respiratory failure ([facility] [address] [J96.00]  Acute respiratory failure with hypoxia ([facility] [address] [J96.01]  Pneumonia of right lower lobe due to infectious organism [J18.9]  Acute congestive heart failure, unspecified heart failure type [facility]) [I50.9]    Patient Name: Porter Gudino  MRN: [id number]    Date of Service: 2/24/2022     Subjective:  Diaz Rodriguez is a 80 y.o. male who was seen and examined today,2/24/2022, at the bedside. Patient was resting comfortably in bed. He denies shortness of breath. No chest pain. Patient tolerated thoracentesis with no complications. Care giver present during my examination. Review of System:   Constitutional:   Denies fever or chills, weight loss or gain, positive for fatigue. HEENT:   Denies ear pain, sore throat, sinus or eye problems. Hard of hearing. Wears hearing aids. Cardiovascular:   Denies any chest pain, irregular heartbeats, or palpitations. Respiratory:   Denies shortness of breath at rest, coughing, sputum production, hemoptysis, or wheezing. Gastrointestinal:   Denies nausea, vomiting, diarrhea, or constipation. Denies any abdominal pain. Genitourinary:    Denies any urgency, frequency, hematuria. Voiding  without difficulty. Extremities:   Denies lower extremity swelling, edema or cyanosis. Neurology:    Denies any headache or focal neurological deficits, generalized weakness. Upper extremity tremors. Psch:   Denies being anxious or depressed. Musculoskeletal:    Denies  myalgias, joint complaints or back pain.    Integumentary:   Denies any rashes, ulcers, or excoriations. Denies bruising. Hematologic/Lymphatic:  Denies bruising or bleeding. Physical Exam:  I/O this shift: In: 570 [P.O.:570]  Out: -     Intake/Output Summary (Last 24 hours) at 2/24/2022 1658  Last data filed at 2/24/2022 1415  Gross per 24 hour   Intake 570 ml   Output --   Net 570 ml   I/O last 3 completed shifts:  In: -   Out: 100 [Urine:100]  Patient Vitals for the past 96 hrs (Last 3 readings):   Weight   02/24/22 0030 139 lb 12.8 oz (63.4 kg)   02/23/22 1453 135 lb (61.2 kg)   02/23/22 0651 135 lb 12.8 oz (61.6 kg)     Vital Signs:   Blood pressure 139/70, pulse 78, temperature 97.1 °F (36.2 °C), temperature source Infrared, resp. rate 20, height 5' 6\" (1.676 m), weight 139 lb 12.8 oz (63.4 kg), SpO2 90 %. General appearance:  Alert, responsive, oriented to person, place, and time. Well preserved, alert, no distress. Head:  Normocephalic. No masses, lesions or tenderness. Eyes:  PERRLA. EOMI. Sclera clear. ENT:  Ears normal. Mucosa normal. Hard of hearing. Neck:    Supple. Trachea midline. No thyromegaly. No JVD. No bruits. Heart:    Irregularly irregular rate and rhythm. Atrial fibrillation on the monitor. Murmur present. Lungs:    Symmetrical. Clear to auscultation bilaterally. No wheezes. No rhonchi. No rales. Dressing to right lower back status post thoracentesis no drainage noted. No subcu emphysema. Abdomen:   Soft. Non-tender. Non-distended. Bowel sounds positive. No organomegaly or masses. No pain on palpation. Extremities:    Peripheral pulses present. No peripheral edema. No ulcers. No cyanosis. No clubbing. Neurologic:    Alert. No focal deficit. Cranial nerves grossly intact. Generalized weakness. Tremors noted in the upper extremities. Psych:   Behavior is normal. Mood appears normal. Speech is not rapid and/or pressured. Musculoskeletal:   Spine ROM normal. Muscular strength weak. Gait not assessed.   Integumentary:  No rashes  Skin normal color and texture. Genitalia/Breast:  Deferred    Medication:  Scheduled Meds:   aspirin  81 mg Oral Daily    donepezil  10 mg Oral Nightly    melatonin  5 mg Oral Nightly    polyethylene glycol  17 g Oral Daily    pravastatin  20 mg Oral Daily    primidone  50 mg Oral BID    sertraline  25 mg Oral Daily    cefTRIAXone (ROCEPHIN) IV  1,000 mg IntraVENous Q24H    doxycycline hyclate  100 mg Oral 2 times per day    enoxaparin  40 mg SubCUTAneous Daily    carbidopa-levodopa  1 tablet Oral TID    pantoprazole  40 mg Oral QAM AC    lidocaine  5 mL IntraDERmal Once    sodium chloride flush  5-40 mL IntraVENous 2 times per day    heparin flush  1 mL IntraVENous 2 times per day     Continuous Infusions:   sodium chloride         Objective Data:  CBC with Differential:    Lab Results   Component Value Date    WBC 4.0 02/24/2022    RBC 2.49 02/24/2022    HGB 7.2 02/24/2022    HCT 23.4 02/24/2022     02/24/2022    MCV 94.0 02/24/2022    MCH 28.9 02/24/2022    MCHC 30.8 02/24/2022    RDW 15.7 02/24/2022    LYMPHOPCT 34.9 02/24/2022    MONOPCT 10.9 02/24/2022    BASOPCT 0.2 02/24/2022    MONOSABS 0.44 02/24/2022    LYMPHSABS 1.41 02/24/2022    EOSABS 0.02 02/24/2022    BASOSABS 0.01 02/24/2022     CMP:    Lab Results   Component Value Date     02/24/2022    K 4.0 02/24/2022    K 4.8 02/23/2022     02/24/2022    CO2 25 02/24/2022    BUN 17 02/24/2022    CREATININE 0.9 02/24/2022    GFRAA >60 02/24/2022    LABGLOM >60 02/24/2022    GLUCOSE 97 02/24/2022    PROT 8.7 02/23/2022    LABALBU 3.9 02/23/2022    CALCIUM 8.4 02/24/2022    BILITOT 0.4 02/23/2022    ALKPHOS 128 02/23/2022    AST 48 02/23/2022    ALT 9 02/23/2022       Wound Documentation:   Wound 09/03/21 Shoulder Left #1 (Active)   Number of days: 174       Wound 09/03/21 Arm Left; Lower #2 block (Active)   Number of days: 174       Wound 10/16/21 Radial Left (Active)   Number of days: 131       Assessment:  1.  Acute respiratory failure with hypoxia with suspicion for decompensated diastolic congestive heart failure with findings of bilateral pleural effusions status post right thoracentesis performed on February 24 with removal of 1690 mL of straw-colored pleural fluid. 2. Consideration for community-acquired pneumonia  3. Anemia of chronic disease. 4. Coronary artery disease  5. History of CVA  6. Hyperlipidemia  7. Parkinson's disease      Plan:   Patient seen status post thoracentesis. Tolerated procedure well. No complications. Echocardiogram is pending. Continue antibiotic therapy. Lovenox for VTE prophylaxis. Patient's medications reviewed/continued/adjusted. Tonics for GI prophylaxis. Activity as tolerated. PT/OT to evaluate and treat.  for discharge planning. Continue current therapy. See orders for further plan of care. More than 50% of my  time was spent at the bedside counseling/coordinating care with the patient and/or family with face to face contact. This time was spent reviewing notes and laboratory data as well as instructing and counseling the patient. Time I spent with the family or surrogate(s) is included only if the patient was incapable of providing the necessary information or participating in medical decisions. I also discussed the differential diagnosis and all of the proposed management plans with the patient and individuals accompanying the patient. Pipe Navarro requires this high level of physician care and nursing on the IMC/Telemetry unit due the complexity of decision management and chance of rapid decline or death. Continued cardiac monitoring and higher level of nursing are required. I am readily available for any further decision-making and intervention. The patient was seen, examined and then discussed with Dr. Katelyn Lynn. Chaparro Eli, STEPHON - CNP   2/24/2022  4:58 PM        I agree with the findings and the plan of care as documented in Chaparro Eli NP-C's  note.     Ministerio Vidal DO Rothman D.O., FACOI  5:12 PM  2/24/2022

## 2022-02-24 NOTE — PROGRESS NOTES
hemiarthroplasty    Age-related macular degeneration    Acute respiratory failure (HCC)        ASSESSMENT of Current Deficits Patient exhibits decreased strength, balance and endurance impairing functional mobility, transfers, gait  and tolerance to activity. Patient just came back from special procedures, right thoracentesis, today. Patient fatigued but willing to work with therapy. Hip hemiarthroplasty in Oct 2021, educated on precautions. Mod assist for bed mobility, max assist for transfers. Family present, requesting HHPT, not open to subacute rehab, patient has 24/7 care. The patient will benefit from continued skilled therapy to increase strength and improve balance for safe functional mobility, to decrease risk of falls, and to meet goals at discharge. PHYSICAL THERAPY  PLAN OF CARE       Physical therapy plan of care is established based on physician order,  patient diagnosis and clinical assessment    Current Treatment Recommendations:    -Bed Mobility: Lower extremity exercises   -Sitting Balance: Incorporate reaching activities to activate trunk muscles   -Standing Balance: Perform strengthening exercises in standing to promote motor control with or without upper extremity support   -Transfers: Provide instruction on proper hand and foot position for adequate transfer of weight onto lower extremities and use of gait device if needed and Cues for hand placement, technique and safety. Provide stabilization to prevent fall   -Gait: Gait training and Standing activities to improve: base of support, weight shift, weight bearing      PT long term treatment goals are located in below grid    Patient and or family understand(s) diagnosis, prognosis, and plan of care. Frequency of treatments: Patient will be seen  daily.          Prior Level of Function: Patient ambulated with wheeled walker and assistance   Rehab Potential: good  for baseline    Past medical history:   Past Medical History: Diagnosis Date    Anemia     Anxiety     CAD (coronary artery disease)     Candida esophagitis (HCC)     Cerebral artery occlusion with cerebral infarction ([facility] [address]     Chronic kidney disease     Hemorrhage     right eye    Hyperlipidemia     Macular degeneration     Parkinson's disease ([facility] [address]     Raynaud disease     Tremors of nervous system     Vitamin D deficiency      Past Surgical History:   Procedure Laterality Date    CORONARY ANGIOPLASTY WITH STENT PLACEMENT      HIP SURGERY Left 10/14/2021    HIP HEMIARTHROPLASTY performed by Afsaneh Peguero MD at [address] [facility] N/A 11/13/2019    EGD BIOPSY performed by Melinda Chavez MD at [address] [facility] N/A 10/20/2021    EGD ESOPHAGOGASTRODUODENOSCOPY PEG TUBE INSERTION performed by Alize Hassan MD at [address]:    Precautions: Activity as tolerated, falls , [place], hip hemiarthroplasty oct 202, tremors,  Right eye deficits, raynauds disease   Social history: Patient lives with daughter in a two story home resides first  with 3 steps  to enter bilateral Rail  Tub shower grab bars  Dtr works ~ 10-12 hours/day - Pt has Aides for ~ 12 hours when Dtr is at Work.  24/7 SUP  Equipment owned: Cane, Jose Gold, Rollator, Shower chair and Handicap height commode,      AM-[facility] Basic Mobility       AM-[facility] Mobility Inpatient   How much difficulty turning over in bed?: A Little  How much difficulty sitting down on / standing up from a chair with arms?: A Little  How much difficulty moving from lying on back to sitting on side of bed?: A Little  How much help from another person moving to and from a bed to a chair?: A Lot  How much help from another person needed to walk in hospital room?: A Lot  How much help from another person for climbing 3-5 steps with a railing?: A Lot  AM-PAC Inpatient Mobility Raw Score : 15  AM-PAC Inpatient T-Scale Score : 39.45  Mobility Inpatient CMS 0-100% Score: 57.7  Mobility Inpatient CMS G-Code Modifier : CK    Nursing cleared patient for PT evaluation. The admitting diagnosis and active problem list as listed above have been reviewed prior to the initiation of this evaluation. OBJECTIVE;   Initial Evaluation  Date: 2/24/2022 Treatment Date:     Short Term/ Long Term   Goals   Was pt agreeable to Eval/treatment? Yes  To be met in 4 days   Pain level   6/10  Left hip      Bed Mobility    Rolling: Minimal assist of 1    Supine to sit: Moderate assist of 1    Sit to supine: Moderate assist of 1    Scooting: Minimal assist of 1    Rolling: Independent    Supine to sit:  Independent    Sit to supine: Independent    Scooting: Independent     Transfers Sit to stand: Maximal assist of 1   Sit to stand: Minimal assist of 1    Ambulation    not assessed    20 feet using  wheeled walker with Minimal assist of 1    Stair negotiation: ascended and descended   Not assessed     3 steps with HR and min A   ROM Within functional limits    Increase range of motion 10% of affected joints    Strength BUE:  refer to OT eval  RLE:  4-/5  LLE:  4-/5  Increase strength in affected mm groups by 1/3 grade   Balance Sitting EOB:  fair +  Dynamic Standing:  poor + wheeled walker    Sitting EOB:  good   Dynamic Standing: fair +     Patient is Alert & Oriented x person, place and time and follows one step directions    Sensation:  Patient  denies numbness/tingling   Edema:  no   Endurance: fair  -    Vitals: 2 liters nasal cannula   Blood Pressure at rest  Blood Pressure during session    Heart Rate at rest  Heart Rate during session    SPO2 at rest 94%  SPO2 during session 88-94% hands cold, difficult to get accurate readings     Patient education  Patient educated on role of Physical Therapy, risks of immobility, safety and plan of care,  importance of mobility while in hospital , ankle pumps, quad set and glut set for edema control, blood clot prevention, importance and purpose of adaptive device and adjusted to proper height for the patient. , hip precautions and safety      Patient response to education:   Pt verbalized understanding Pt demonstrated skill Pt requires further education in this area   Yes Partial Yes      Treatment:  Patient practiced and was instructed/facilitated in the following treatment: Patient rolled x2 to assist wound care. Socked donned. Patient assisted to EOB. Sat edge of bed 10 minutes with Minimal assist of 1 to increase dynamic sitting balance and activity tolerance. Patient stood x 2 reps maximal assist with feet blocked, patient fatigued from recent procedure today. Patient unable to take steps. Patient assisted back to supine, rolled x2 for chux pad rearrangement and assisted to HOB max x 2. Heel protectors donned. Tactile cues due to [facility] to complete task. Therapeutic Exercises:  not performed  x  reps. At end of session, patient in bed with alarm and daughter present call light and phone within reach,  all lines and tubes intact, nursing notified. Patient would benefit from continued skilled Physical Therapy to improve functional independence and quality of life. Patient's/ family goals   home with HHPT    Time in  930  Time out  1000    Total Treatment Time  10 minutes    Evaluation time includes thorough review of current medical information, gathering information on past medical history/social history and prior level of function, completion of standardized testing/informal observation of tasks, assessment of data, and development of Plan of care and goals.      CPT codes:  Low Complexity PT evaluation (06933)  Therapeutic activities (61908)   10 minutes  1 unit(s)    Lori November, PT

## 2022-02-24 NOTE — FLOWSHEET NOTE
Inpatient Wound Care    Admit Date: 2/23/2022  6:42 AM    Reason for consult:  Skin care    Significant history:    Past Medical History:   Diagnosis Date    Anemia     Anxiety     CAD (coronary artery disease)     [facility] es[facility]itis ([facility] [address]     Cerebral artery occlusion with cerebral infarction ([facility] [address]     Chronic kidney disease     Hemorrhage     right eye    Hyperlipidemia     Macular degeneration     Parkinson's disease ([facility] [address]     Raynaud disease     Tremors of nervous system     Vitamin D deficiency        Wound history:      Findings:       02/24/22 0923   Skin Integrity   Skin Integrity (WDL) X   Skin Integrity Redness   Location   (buttocks)   Skin Integrity Site 2   Skin Integrity Location 2 Redness   Location 2   (heels)   Skin Integrity Site 3   Skin Integrity Location 3 Redness    Location 3   (back)       Impression:  redness    Interventions in place:  Protective ointment  Heel protector  mepilex to back    Plan:  Cont treatment      Perla Cunha RN 2/24/2022 9:26 AM

## 2022-02-25 NOTE — PROGRESS NOTES
[facility]  [address], [address]         [facility]                                                   Patient Name: Ashley Bonilla     MRN: [id number]     : 1925     Room: [address] [place] [address]       Evaluating OT: Marcio Kyle OTR/L; [id number]       Referring Provider and Orders/Date:   OT eval and treat Start: 22 1230, End: 22 1230, ONE TIME, Standing Count: 1 Occurrences, R    Bushra Peñaloza DO     Diagnosis:   1. Acute respiratory failure with hypoxia ([address]    2. Acute congestive heart failure, unspecified heart failure type ([address]    3.  Pneumonia of right lower lobe due to infectious organism           Pertinent Medical History:        Past Medical History:   Diagnosis Date    Anemia     Anxiety     CAD (coronary artery disease)     Candida esophagitis (HCC)     Cerebral artery occlusion with cerebral infarction (HCC)     Chronic kidney disease     Hemorrhage     right eye    Hyperlipidemia     Macular degeneration     Parkinson's disease ([address]     Raynaud disease     Tremors of nervous system     Vitamin D deficiency           Past Surgical History:   Procedure Laterality Date    CORONARY ANGIOPLASTY WITH STENT PLACEMENT      HIP SURGERY Left 10/14/2021    HIP HEMIARTHROPLASTY performed by Isma Raines MD at [facility] N/A 2019    EGD BIOPSY performed by Alejandro Valdez MD at [address] [place] [address] N/A 10/20/2021    EGD ESOPHAGOGASTRODUODENOSCOPY PEG TUBE INSERTION performed by Stephanie Corea MD at [facility] ENDOSCOPY       Precautions:  Fall Risk,  hip hemiarthroplasty oct 202, tremors    Recommended placement: home with     Assessment of current deficits     [x] Functional mobility  [x]ADLs  [x] Strength               [x]Cognition     [x] Functional transfers   [x] IADLs         [x] Safety Awareness [x]Endurance     [] Fine Coordination              [x] Balance      [] Vision/perception   []Sensation      [x]Gross Motor Coordination  [] ROM  [] Delirium                   [] Motor Control     OT PLAN OF CARE   OT POC based on physician orders, patient diagnosis and results of clinical assessment    Frequency/Duration 1-3 days/wk for 2 weeks PRN   Specific OT Treatment Interventions to include:   * Instruction/training on adapted ADL techniques and AE recommendations to increase functional independence within precautions       * Training on energy conservation strategies, correct breathing pattern and techniques to improve independence/tolerance for self-care routine  * Functional transfer/mobility training/DME recommendations for increased independence, safety, and fall prevention  * Patient/Family education to increase follow through with safety techniques and functional independence  * Recommendation of environmental modifications for increased safety with functional transfers/mobility and ADLs  * Cognitive retraining/development of therapeutic activities to improve problem solving, judgement, memory, and attention for increased safety/participation in ADL/IADL tasks  * Therapeutic exercise to improve motor endurance, ROM, and functional strength for ADLs/functional transfers  * Therapeutic activities to facilitate/challenge dynamic balance, stand tolerance for increased safety and independence with ADLs  * Therapeutic activities to facilitate gross/fine motor skills for increased independence with ADLs  * Neuro-muscular re-education: facilitation of righting/equilibrium reactions, midline orientation, scapular stability/mobility, normalization of muscle tone, and facilitation of volitional active controled movement  * Positioning to improve skin integrity, interaction with environment and functional independence     Recommended Adaptive Equipment/DME:  TBD      Home Living: Pt lives Patient lives with daughter in a two story home resides first with 3 steps  to enter bilateral Rail. Dtr works ~ 10-12 hours/day - Pt has Aides for ~ 12 hours when Dtr is at Work. 24/7 SUP provided. Bathroom setup: tub shower with bars, shower chair, high toilet     DME owned: cane, ww     Per granddaughter, pt has assist with Dressing/Toileting, Transfers and Mobility using No AD for household ambulation with assist.  SUP for safety w/ bathing.  Pt reported pt Charissa Hernadez forgets to use his Cane. \"    Driving: no   Occupation: retired   Enjoys: grooming tasks    Pain Level: none  Cognition: A&O: 1/4 to self; Follows 1-2 step directions   Memory:  Fair-   Sequencing:  Fair-   Problem solving:  Fair-   Judgement/safety:  Fair-    AM-[facility] Daily Activity Inpatient   How much help for putting on and taking off regular lower body clothing?: A Lot  How much help for Bathing?: A Lot  How much help for Toileting?: Total  How much help for putting on and taking off regular upper body clothing?: A Lot  How much help for taking care of personal grooming?: A Little  How much help for eating meals?: A Little  AM-[facility] Inpatient Daily Activity Raw Score: 13  AM-[facility] Inpatient ADL T-Scale Score : 32.03  ADL Inpatient CMS 0-100% Score: 63.03  ADL Inpatient CMS G-Code Modifier : CL     Functional Assessment:     Initial Eval Status  Date: 2/25/2022   Treatment Status  Date: STGs = LTGs  Time frame: 10-14 days   Feeding Minimal Assist with set up and safety cues required for taking smaller bites. Pt required assistance to find items on his tray due to vision limitations. SBA   Grooming Minimal Assist with assist for hand wash. Pt was able to wash face, comb hair and use electric shaver. Stand by Assist    UB Dressing Maximal Assist to don and doff gown from sitting in chair. Moderate Assist    LB Dressing Maximal Assist with pants and socks from sitting/standing  Moderate Assist    Bathing Maximal Assist with sponge bathing per family report. Moderate Assist    Toileting Dependent with feliberto care and clothing management  Moderate Assist    Bed Mobility  Pt up in arm chair on arrival.   Supine to sit: Stand by Assist   Sit to supine: Stand by Assist    Functional Transfers Minimal Assist from arm chair x 3 with ADLs with walker. Stand by Assist    Functional Mobility Minimal Assist with walker for short distance functional mobility throughout the room to simulate house hold distances. Stand by Assist    Balance Sitting:     Static:  fair    Dynamic:fair-  Standing: fair-  Sitting:     Static:  good    Dynamic:fair+  Standing: fair   Activity Tolerance Vitals with activity: 1L with O2 95%  Fair tolerance overall with confusion. Standing tolerance 1-2min. Increase standing tolerance for >2min with stable vital signs for carry over into toileting, functional tranfers and indep in ADLs   Visual/  Perceptual Glasses: Present; Macular degeneration; right eye impairment    Reports change in vision since admission: No     NA   Naeem UE Strengthening  4-/5 generally  4/5MMT generally for carry over into self care, functional transfers and functional mobility with AD. Hand Dominance  [x] Right  [] Left    AROM (PROM) Strength Additional Info:    RUE  WFL 4-/5 good  and fair FMC/dexterity noted during ADL tasks-tremors  Opposition [x] Intact [] Impaired  Finger to nose [x] Intact [] Impaired     LUE WFL 4-/5 good  and fair FMC/dexterity noted during ADL tasks-tremors  Opposition [x] Intact [] Impaired  Finger to nose [x] Intact [] Impaired     Hearing: [place]  Sensation: Raynaud disease  Tone: WFL   Edema: none    Comments: Upon arrival of first attempt, patient fatigued. Family present and did request that therapy allow him to rest and try again later for increased function. Discussed OT POC, recommendations for [place] and family provided PLOF. Pt required max A for most UB ADLs and maxA LB ADLs tasks.  Limited with min A for standing during LB ADLs and functional transfers. The biggest barriers reflect that of functional transfers, functional mobility, UB/LB ADLs, cognition, activity tolerance, balance, safety and strengthening. At end of session, patient sitting up in arm chair with call light and phone within reach, all lines and tubes intact. Overall patient demonstrated decreased independence and safety during completion of ADL/functional transfer/mobility tasks compared to PLOF. Nursing updated on pt position and status following OT eval. Pt would benefit from continued skilled OT to increase safety and independence with completion of ADL/IADL tasks for functional independence and quality of life. Treatment: OT treatment provided this date includes:   Instruction, education and training on safe facilitation and adapted techniques for completion of ADLs. These include neuromuscular reeducation to facilitate balance/righting reactions,safe functional transfer techniques, proper positioning/alignment to improve interaction with environment and overall function and on adapted techniques/work simplification for completion of ADLs. Education provided on hand/feet placement with arm chair, walker and body mechanics for fall prevention. Cues for energy conservation and safety for in the home at DC, including modifications and DME. Extended time to complete all tasks, including skilled monitoring of patient's response during treatment session and vital signs. Prior to and at the end of session, environmental modifications / line management completed for patients safety and efficiency of treatment session. See above for further details. Rehab Potential: fair for established goals     Patient / Family Goal: O2 management      Patient and/or family were instructed on functional diagnosis, prognosis/goals and OT plan of care. Demonstrated fair understanding.      Eval Complexity: Low  · History: Brief review of medical records and additional review of physical, cognitive, or psychosocial history related to current functional performance  · Exam: 3+ performance deficits  · Assistance/Modification: Mod assistance or modifications required to perform tasks. May have comorbidities that affect occupational performance. Time In: 1010  Time Out: 1020  Time In: 1259  Time Out: 1337  Total Treatment Time: 28    Min Units   OT Eval Low 97165  x  1   OT Eval Medium 24524      OT Eval High 84112      OT Re-Eval 09828       Therapeutic Ex 52697       Therapeutic Activities 09256  12 1    ADL/Self Care 60083  16 1    Orthotic Management 02030       Manual 63552     Neuro Re-Ed 03858       Non-Billable Time          Evaluation Time additionally includes thorough review of current medical information, gathering information on past medical history/social history and prior level of function, interpretation of standardized testing/informal observation of tasks, assessment of data and development of plan of care and goals.             Ferny Rodriguez OTR/L; [id number]

## 2022-02-25 NOTE — PLAN OF CARE
Problem: Falls - Risk of:  Goal: Will remain free from falls  Description: Will remain free from falls  2/24/2022 2354 by Ashely Koenig RN  Outcome: Met This Shift     Problem: Falls - Risk of:  Goal: Absence of physical injury  Description: Absence of physical injury  2/24/2022 2354 by Ashely Koenig RN  Outcome: Met This Shift     Problem: Skin Integrity:  Goal: Will show no infection signs and symptoms  Description: Will show no infection signs and symptoms  2/24/2022 2354 by Ashely Koenig RN  Outcome: Met This Shift     Problem: Skin Integrity:  Goal: Absence of new skin breakdown  Description: Absence of new skin breakdown  2/24/2022 2354 by Ashely Koenig RN  Outcome: Met This Shift     Problem: Injury - Risk of, Physical Injury:  Goal: Will remain free from falls  Description: Will remain free from falls  2/24/2022 2354 by Ashely Koenig RN  Outcome: Met This Shift     Problem: Injury - Risk of, Physical Injury:  Goal: Absence of physical injury  Description: Absence of physical injury  2/24/2022 2354 by Ashely Koenig RN  Outcome: Met This Shift     Problem: Mood - Altered:  Goal: Mood stable  Description: Mood stable  2/24/2022 2354 by Ashely Koenig RN  Outcome: Met This Shift     Problem: Mood - Altered:  Goal: Absence of abusive behavior  Description: Absence of abusive behavior  2/24/2022 2354 by Ashely Koenig RN  Outcome: Met This Shift     Problem: Psychomotor Activity - Altered:  Goal: Absence of psychomotor disturbance signs and symptoms  Description: Absence of psychomotor disturbance signs and symptoms  2/24/2022 2354 by Ashely Koenig RN  Outcome: Met This Shift     Problem: Sensory Perception - Impaired:  Goal: Demonstrations of improved sensory functioning will increase  Description: Demonstrations of improved sensory functioning will increase  2/24/2022 2354 by Ashely Koenig RN  Outcome: Met This Shift     Problem: Sleep Pattern Disturbance:  Goal: Appears well-rested  Description: Appears well-rested  2/24/2022 2354 by Reyes Fritter, RN  Outcome: Met This Shift

## 2022-02-25 NOTE — PROGRESS NOTES
Internal Medicine Progress Note    [facility]    Tammy Nino. Carmen Aguirre., F.A.C.OWesleyI. Alex Bustos D.O., MALORIECWesleyOGRACIELA Richter D.O. Trisha Bradley, MSN, APRN, NP-C  Chelsie Pitcairn. Dion Figueroa, MSN, APRN-CNP     Primary Care Physician: Emre Villafuerte DO   Admitting Physician:  Liya Lara DO  Admission date and time: 2/23/2022  6:42 AM    Room:  23/0623-01  Admitting diagnosis: Acute respiratory failure ([facility] [J96.00]  Acute respiratory failure with hypoxia ([facility] [J96.01]  Pneumonia of right lower lobe due to infectious organism [J18.9]  Acute congestive heart failure, unspecified heart failure type [facility]) [I50.9]    Patient Name: Miguel Villalpando  MRN: [id number]    Date of Service: 2/25/2022     Subjective:  Jean is a 80 y.o. male who was seen and examined today,2/25/2022, at the bedside. Patient was resting comfortably in bed. He denies shortness of breath. No chest pain. Admits to cough and sputum production. Daughter present and states patient usually takes Mucinex with some relief. Daughter, [facility] present, during my examination. Review of System:   Constitutional:   Denies fever or chills, weight loss or gain, positive for fatigue. HEENT:   Denies ear pain, sore throat, sinus or eye problems. Hard of hearing. Wears hearing aids. Cardiovascular:   Denies any chest pain, irregular heartbeats, or palpitations. Respiratory:   Denies shortness of breath at rest, + cough and thin sputum production, no hemoptysis, or wheezing. Gastrointestinal:   Denies nausea, vomiting, diarrhea, or constipation. Denies any abdominal pain. Genitourinary:    Denies any urgency, frequency, hematuria. Voiding  without difficulty. Extremities:   Denies lower extremity swelling, edema or cyanosis. Neurology:    Denies any headache or focal neurological deficits, generalized weakness. Upper extremity tremors. Psch:   Denies being anxious or depressed.   Musculoskeletal: Denies  myalgias, joint complaints or back pain. Integumentary:   Denies any rashes, ulcers, or excoriations. Denies bruising. Hematologic/Lymphatic:  Denies bruising or bleeding. Physical Exam:  No intake/output data recorded. Intake/Output Summary (Last 24 hours) at 2/25/2022 1331  Last data filed at 2/24/2022 1840  Gross per 24 hour   Intake 320 ml   Output --   Net 320 ml   I/O last 3 completed shifts: In: 770 [P.O.:770]  Out: -   Patient Vitals for the past 96 hrs (Last 3 readings):   Weight   02/25/22 0600 132 lb 12.8 oz (60.2 kg)   02/24/22 0030 139 lb 12.8 oz (63.4 kg)   02/23/22 1453 135 lb (61.2 kg)     Vital Signs:   Blood pressure (!) 131/91, pulse 96, temperature 97.7 °F (36.5 °C), temperature source Infrared, resp. rate 20, height 5' 6\" (1.676 m), weight 132 lb 12.8 oz (60.2 kg), SpO2 94 %. General appearance:  Alert, responsive, oriented to person, place, and time. Well preserved, alert, no distress. Head:  Normocephalic. No masses, lesions or tenderness. Eyes:  PERRLA. EOMI. Sclera clear. ENT:  Ears normal. Mucosa normal. Hard of hearing. Missing teeth  Neck:    Supple. Trachea midline. No thyromegaly. No JVD. No bruits. Heart:    Irregularly irregular rate and rhythm. Atrial fibrillation on the monitor. Murmur present. Lungs:    Symmetrical. Clear to auscultation bilaterally. No wheezes. No rhonchi. No rales. Dressing to right lower back status post thoracentesis no drainage noted. No subcu emphysema. Abdomen:   Soft. Non-tender. Non-distended. Bowel sounds positive. No organomegaly or masses. No pain on palpation. Extremities:    Peripheral pulses present. No peripheral edema. No ulcers. No cyanosis. No clubbing. Neurologic:    Alert. No focal deficit. Cranial nerves grossly intact. Generalized weakness. Tremors consist with Dx of Parkinsons  Psych:   Behavior is normal. Mood appears normal. Speech is not rapid and/or pressured.   Musculoskeletal:   Spine ROM normal. Muscular strength weak. Gait not assessed. Integumentary:  No rashes  Skin normal color and texture. Genitalia/Breast:  Deferred    Medication:  Scheduled Meds:   aspirin  81 mg Oral Daily    donepezil  10 mg Oral Nightly    melatonin  5 mg Oral Nightly    polyethylene glycol  17 g Oral Daily    pravastatin  20 mg Oral Daily    primidone  50 mg Oral BID    sertraline  25 mg Oral Daily    cefTRIAXone (ROCEPHIN) IV  1,000 mg IntraVENous Q24H    doxycycline hyclate  100 mg Oral 2 times per day    enoxaparin  40 mg SubCUTAneous Daily    carbidopa-levodopa  1 tablet Oral TID    pantoprazole  40 mg Oral QAM AC    lidocaine  5 mL IntraDERmal Once    sodium chloride flush  5-40 mL IntraVENous 2 times per day    heparin flush  1 mL IntraVENous 2 times per day     Continuous Infusions:   sodium chloride         Objective Data:  CBC with Differential:    Lab Results   Component Value Date    WBC 3.5 02/25/2022    RBC 2.58 02/25/2022    HGB 7.4 02/25/2022    HCT 24.4 02/25/2022     02/25/2022    MCV 94.6 02/25/2022    MCH 28.7 02/25/2022    MCHC 30.3 02/25/2022    RDW 15.7 02/25/2022    LYMPHOPCT 33.6 02/25/2022    MONOPCT 9.9 02/25/2022    BASOPCT 0.3 02/25/2022    MONOSABS 0.35 02/25/2022    LYMPHSABS 1.19 02/25/2022    EOSABS 0.03 02/25/2022    BASOSABS 0.01 02/25/2022     CMP:    Lab Results   Component Value Date     02/25/2022    K 3.9 02/25/2022    K 4.8 02/23/2022     02/25/2022    CO2 26 02/25/2022    BUN 23 02/25/2022    CREATININE 0.9 02/25/2022    GFRAA >60 02/25/2022    LABGLOM >60 02/25/2022    GLUCOSE 95 02/25/2022    PROT 8.7 02/23/2022    LABALBU 3.9 02/23/2022    CALCIUM 8.4 02/25/2022    BILITOT 0.4 02/23/2022    ALKPHOS 128 02/23/2022    AST 48 02/23/2022    ALT 9 02/23/2022       Wound Documentation:   Wound 09/03/21 Shoulder Left #1 (Active)   Number of days: 174       Wound 09/03/21 Arm Left; Lower #2 block (Active)   Number of days: 174       Wound 10/16/21 Radial Left (Active)   Number of days: 131       Assessment:  Acute respiratory failure with hypoxia with suspicion for decompensated diastolic congestive heart failure with findings of bilateral pleural effusions status post right thoracentesis performed on February 24 with removal of 1690 mL of straw-colored pleural fluid. Consideration for community-acquired pneumonia  Anemia of chronic disease. Coronary artery disease  History of CVA  Hyperlipidemia  Parkinson's disease      Plan:   Echocardiogram is pending. Continue antibiotic therapy. Will transfuse one unit of PRBC's over 4 hours with Lasix 40 mg IV prior to blood administration. Monitor Hgb and transfuse as warranted. Add mucinex. Lovenox for VTE prophylaxis. Patient's medications reviewed/continued/adjusted. Tonics for GI prophylaxis. Activity as tolerated. PT/OT to evaluate and treat.  for discharge planning. Continue current therapy. See orders for further plan of care. More than 50% of my  time was spent at the bedside counseling/coordinating care with the patient and/or family with face to face contact. This time was spent reviewing notes and laboratory data as well as instructing and counseling the patient. Time I spent with the family or surrogate(s) is included only if the patient was incapable of providing the necessary information or participating in medical decisions. I also discussed the differential diagnosis and all of the proposed management plans with the patient and individuals accompanying the patient. Zenaida Marrero requires this high level of physician care and nursing on the IMC/Telemetry unit due the complexity of decision management and chance of rapid decline or death. Continued cardiac monitoring and higher level of nursing are required. I am readily available for any further decision-making and intervention. The patient was seen, examined and then discussed with Dr. Marcellus Carias.        STEPHON Waterman - CNP   2/25/2022  1:31

## 2022-02-25 NOTE — PROGRESS NOTES
Physical Therapy Treatment Note/Plan of Care    Room #:  7826/5377-36  Patient Name: Mary Hutchinson  YOB: 1925  MRN: [id number]    Date of Service: 2/25/2022     Tentative placement recommendation: Home Health Physical Therapy with 24/7 assist (family requesting HHPT, no subacute rehab)  Equipment recommendation: None      Evaluating Physical Therapist: Ernie Powell [facility] #[id number]      Specific Provider Orders/Date/Referring Provider :   02/23/22 1230   PT eval and treat Start: 02/23/22 1230, End: 02/23/22 1230, ONE TIME, Standing Count: 1 Occurrences, R    Jared Route, DO      Admitting Diagnosis:   Acute respiratory failure ([address] [J96.00]  Acute respiratory failure with hypoxia ([address] [J96.01]  Pneumonia of right lower lobe due to infectious organism [J18.9]  Acute congestive heart failure, unspecified heart failure type [facility]) [I50.9]      Surgery:      HIP SURGERY Left 10/14/2021      HIP HEMIARTHROPLASTY performed by Grant Card MD at [address] [place] [address]       Visit Diagnoses       Codes    Acute respiratory failure with hypoxia ([address]    -  Primary J96.01    Acute congestive heart failure, unspecified heart failure type ([address]     I50.9          Patient Active Problem List   Diagnosis    Sepsis ([address]    Pneumonia    CAD (coronary artery disease)    Hyperlipidemia    Dysphagia    Subdural hematoma ([address]    H/O: CVA (cerebrovascular accident)    Essential hypertension    Tremors of nervous system    Dementia ([address]    Thrombocytopenia ([address]    Anemia    Urinary retention    Drop attack    Protein-calorie malnutrition, moderate ([address]    Delirium secondary to multiple medical problems    Postoperative anemia due to acute blood loss    Peptic ulcer disease with hemorrhage    Influenza B    Sepsis secondary to influenza B ([address]    GI bleed    Multiple skin tears    Closed left hip fracture, initial encounter ([address]    MARIETTA (acute kidney injury) ([address]    History of left hip hemiarthroplasty    Age-related macular degeneration    Acute respiratory failure (HCC)        ASSESSMENT of Current Deficits Patient exhibits decreased strength, balance and endurance impairing functional mobility, transfers, gait  and tolerance to activity. Patient needing encouragement to perform increased reps for exercise; AAROM needed. Left hip pain noted with some movements, tactile cues needed to perform most of exercises today. The patient will benefit from continued skilled therapy to increase strength and improve balance for safe functional mobility, to decrease risk of falls, and to meet goals at discharge. PHYSICAL THERAPY  PLAN OF CARE     Physical therapy plan of care is established based on physician order,  patient diagnosis and clinical assessment    Current Treatment Recommendations:    -Bed Mobility: Lower extremity exercises   -Sitting Balance: Incorporate reaching activities to activate trunk muscles   -Standing Balance: Perform strengthening exercises in standing to promote motor control with or without upper extremity support   -Transfers: Provide instruction on proper hand and foot position for adequate transfer of weight onto lower extremities and use of gait device if needed and Cues for hand placement, technique and safety. Provide stabilization to prevent fall   -Gait: Gait training and Standing activities to improve: base of support, weight shift, weight bearing      PT long term treatment goals are located in below grid    Patient and or family understand(s) diagnosis, prognosis, and plan of care. Frequency of treatments: Patient will be seen  daily.      Prior Level of Function: Patient ambulated with wheeled walker and assistance   Rehab Potential: good  for baseline    Past medical history:   Past Medical History:   Diagnosis Date    Anemia     Anxiety     CAD (coronary artery disease)     Candida esophagitis (HCC)     Cerebral artery occlusion with cerebral infarction [facility]     Chronic kidney disease     Hemorrhage     right eye    Hyperlipidemia     Macular degeneration     Parkinson's disease ([facility]     Raynaud disease     Tremors of nervous system     Vitamin D deficiency      Past Surgical History:   Procedure Laterality Date    CORONARY ANGIOPLASTY WITH STENT PLACEMENT      HIP SURGERY Left 10/14/2021    HIP HEMIARTHROPLASTY performed by Shani Padilla MD at [facility] N/A 11/13/2019    EGD BIOPSY performed by Frieda Burgos MD at [address] [facility] N/A 10/20/2021    EGD ESOPHAGOGASTRODUODENOSCOPY PEG TUBE INSERTION performed by Sachin Jauregui MD at [address]:    Precautions: Activity as tolerated, falls , [place], hip hemiarthroplasty oct 2021, tremors,  Right eye deficits, raynauds disease   Social history: Patient lives with daughter in a two story home resides first  with 3 steps  to enter bilateral Rail  Tub shower grab bars  Dtr works ~ 10-12 hours/day - Pt has Aides for ~ 12 hours when Dtr is at Work. 24/7 SUP  Equipment owned: Cane, Navin Barbone, Rollator, Shower chair and Handicap height commode,      -[facility] Basic Mobility       -[facility] Mobility Inpatient   How much difficulty turning over in bed?: A Little  How much difficulty sitting down on / standing up from a chair with arms?: A Little  How much difficulty moving from lying on back to sitting on side of bed?: A Little  How much help from another person moving to and from a bed to a chair?: A Little  How much help from another person needed to walk in hospital room?: A Little  How much help from another person for climbing 3-5 steps with a railing?: A Lot  AM-PAC Inpatient Mobility Raw Score : 17  AM-PAC Inpatient T-Scale Score : 42.13  Mobility Inpatient CMS 0-100% Score: 50.57  Mobility Inpatient CMS G-Code Modifier : CK    Nursing cleared patient for PT treatment.       OBJECTIVE;   Initial Evaluation  Date: 2/24/2022 Treatment Date:    2/25/2022    Short Term/ Long Term   Goals   Was pt agreeable to Eval/treatment? Yes yes To be met in 4 days   Pain level   6/10  Left hip  No complaints    Bed Mobility    Rolling: Minimal assist of 1    Supine to sit: Moderate assist of 1    Sit to supine: Moderate assist of 1    Scooting: Minimal assist of 1   Rolling: Not assessed    Supine to sit: Not assessed    Sit to supine: Not assessed    Scooting: Not assessed    Rolling: Independent    Supine to sit:  Independent    Sit to supine: Independent    Scooting: Independent     Transfers Sit to stand: Maximal assist of 1  Sit to stand: Not assessed      Sit to stand: Minimal assist of 1    Ambulation    not assessed  not assessed   20 feet using  wheeled walker with Minimal assist of 1    Stair negotiation: ascended and descended   Not assessed  not assessed    3 steps with HR and min A   ROM Within functional limits    Increase range of motion 10% of affected joints    Strength BUE:  refer to OT eval  RLE:  4-/5  LLE:  4-/5  Increase strength in affected mm groups by 1/3 grade   Balance Sitting EOB:  fair +  Dynamic Standing:  poor + wheeled walker   Sitting EOB: not assessed  Dynamic Standing: not assessed  Sitting EOB:  good   Dynamic Standing: fair +     Patient is Alert & Oriented x person, place and time and follows one step directions    Sensation:  Patient  denies numbness/tingling   Edema:  no   Endurance: fair  -    Vitals: 2 liters nasal cannula   Blood Pressure at rest  Blood Pressure during session    Heart Rate at rest  Heart Rate during session    SPO2 at rest  %  SPO2 during session %     Patient education  Patient educated on  importance of mobility while in hospital , hip precautions and safety      Patient response to education:   Pt verbalized understanding Pt demonstrated skill Pt requires further education in this area   Yes Partial Yes      Treatment:  Patient practiced and was instructed/facilitated in the following treatment:  performed supine exercise AAROM. Educated grand daughter on exercises and hip precautions. Therapeutic Exercises:  ankle pumps, heel slide, hip abduction/adduction and straight leg raise x 10 reps       At end of session, patient in bed with grand daughter present call light and phone within reach,  all lines and tubes intact, nursing notified. Patient would benefit from continued skilled Physical Therapy to improve functional independence and quality of life.          Patient's/ family goals   home with HHPT    Time in  306  Time out  324    Total Treatment Time  18 minutes  CPT codes:     Therapeutic activities (51033)   7 minutes  0 unit(s)  Therapeutic exercises (07732)   11 minutes  1 unit(s)       Yanni Recio PTA EKI#[id number]

## 2022-02-25 NOTE — PROGRESS NOTES
Physical Therapy Treatment Note/Plan of Care    Room #:  3462/4867-72  Patient Name: Miguel Villalpando  YOB: 1925  MRN: [id number]    Date of Service: 2/25/2022     Tentative placement recommendation: Home Health Physical Therapy with 24/7 assist (family requesting HHPT, no subacute rehab)  Equipment recommendation: None      Evaluating Physical Therapist: Caroline More #[id number]      Specific Provider Orders/Date/Referring Provider :   02/23/22 1230   PT eval and treat Start: 02/23/22 1230, End: 02/23/22 1230, ONE TIME, Standing Count: 1 Occurrences, R    Liya Million, DO      Admitting Diagnosis:   Acute respiratory failure ([address] [J96.00]  Acute respiratory failure with hypoxia ([address] [J96.01]  Pneumonia of right lower lobe due to infectious organism [J18.9]  Acute congestive heart failure, unspecified heart failure type [facility]) [I50.9]      Surgery:      HIP SURGERY Left 10/14/2021      HIP HEMIARTHROPLASTY performed by Maddie Concepcion MD at [address] [place] [address]       Visit Diagnoses       Codes    Acute respiratory failure with hypoxia ([address]    -  Primary J96.01    Acute congestive heart failure, unspecified heart failure type ([address]     I50.9          Patient Active Problem List   Diagnosis    Sepsis ([address]    Pneumonia    CAD (coronary artery disease)    Hyperlipidemia    Dysphagia    Subdural hematoma ([address]    H/O: CVA (cerebrovascular accident)    Essential hypertension    Tremors of nervous system    Dementia ([address]    Thrombocytopenia ([address]    Anemia    Urinary retention    Drop attack    Protein-calorie malnutrition, moderate ([address]    Delirium secondary to multiple medical problems    Postoperative anemia due to acute blood loss    Peptic ulcer disease with hemorrhage    Influenza B    Sepsis secondary to influenza B ([address]    GI bleed    Multiple skin tears    Closed left hip fracture, initial encounter ([address]    MARIETTA (acute kidney injury) ([address]    History of left hip hemiarthroplasty    Age-related macular degeneration    Acute respiratory failure (HCC)        ASSESSMENT of Current Deficits Patient exhibits decreased strength, balance and endurance impairing functional mobility, transfers, gait  and tolerance to activity. Min assist for bed mobility and transfers. Family present, Improved sitting balance and tolerance to activity. The patient will benefit from continued skilled therapy to increase strength and improve balance for safe functional mobility, to decrease risk of falls, and to meet goals at discharge. PHYSICAL THERAPY  PLAN OF CARE     Physical therapy plan of care is established based on physician order,  patient diagnosis and clinical assessment    Current Treatment Recommendations:    -Bed Mobility: Lower extremity exercises   -Sitting Balance: Incorporate reaching activities to activate trunk muscles   -Standing Balance: Perform strengthening exercises in standing to promote motor control with or without upper extremity support   -Transfers: Provide instruction on proper hand and foot position for adequate transfer of weight onto lower extremities and use of gait device if needed and Cues for hand placement, technique and safety. Provide stabilization to prevent fall   -Gait: Gait training and Standing activities to improve: base of support, weight shift, weight bearing      PT long term treatment goals are located in below grid    Patient and or family understand(s) diagnosis, prognosis, and plan of care. Frequency of treatments: Patient will be seen  daily.      Prior Level of Function: Patient ambulated with wheeled walker and assistance   Rehab Potential: good  for baseline    Past medical history:   Past Medical History:   Diagnosis Date    Anemia     Anxiety     CAD (coronary artery disease)     Candida esophagitis (HCC)     [facility]bral [facility] with [facility]     Chronic kidney disease     Hemorrhage     right eye    Hyperlipidemia     Macular degeneration     Parkinson's disease ([facility]     Raynaud disease     Tremors of nervous system     Vitamin D deficiency      Past Surgical History:   Procedure Laterality Date    CORONARY ANGIOPLASTY WITH STENT PLACEMENT      HIP SURGERY Left 10/14/2021    HIP HEMIARTHROPLASTY performed by Lilia Beth MD at [address] N/A 11/13/2019    EGD BIOPSY performed by Sukhjinder Lux MD at [facility] N/A 10/20/2021    EGD ESOPHAGOGASTRODUODENOSCOPY PEG TUBE INSERTION performed by Javier Chavez MD at [address]:    Precautions: Activity as tolerated, falls , [place], hip hemiarthroplasty oct 2021, tremors,  Right eye deficits, raynauds disease   Social history: Patient lives with daughter in a two story home resides first  with 3 steps  to enter bilateral Rail  Tub shower grab bars  Dtr works ~ 10-12 hours/day - Pt has Aides for ~ 12 hours when Dtr is at Work. 24/7 SUP  Equipment owned: Cane, [address], Rollator, Shower chair and Handicap height commode,      -[facility] Basic Mobility       -[facility] Mobility Inpatient   How much difficulty turning over in bed?: A Little  How much difficulty sitting down on / standing up from a chair with arms?: A Little  How much difficulty moving from lying on back to sitting on side of bed?: A Little  How much help from another person moving to and from a bed to a chair?: A Little  How much help from another person needed to walk in hospital room?: A Little  How much help from another person for climbing 3-5 steps with a railing?: A Lot  -PAC Inpatient Mobility Raw Score : 17  -PAC Inpatient T-Scale Score : 42.13  Mobility Inpatient CMS 0-100% Score: 50.57  Mobility Inpatient CMS G-Code Modifier : CK    Nursing cleared patient for PT treatment.       OBJECTIVE;   Initial Evaluation  Date: 2/24/2022 Treatment Date:    2/25/2022    Short Term/ Long Term   Goals   Was pt agreeable to Eval/treatment? Yes yes To be met in 4 days   Pain level   6/10  Left hip  No complaint    Bed Mobility    Rolling: Minimal assist of 1    Supine to sit: Moderate assist of 1    Sit to supine: Moderate assist of 1    Scooting: Minimal assist of 1   Rolling: Minimal assist of 1   Supine to sit: Minimal assist of 1   Sit to supine: Not assessed    Scooting: Minimal assist of 1   Rolling: Independent    Supine to sit:  Independent    Sit to supine: Independent    Scooting: Independent     Transfers Sit to stand: Maximal assist of 1  Sit to stand: Minimal assist of 1 x 3 reps     Sit to stand: Minimal assist of 1    Ambulation    not assessed  6 feet using  wheeled walker with Minimal assist of 1   for walker control, balance and safety   20 feet using  wheeled walker with Minimal assist of 1    Stair negotiation: ascended and descended   Not assessed  not assessed    3 steps with HR and min A   ROM Within functional limits    Increase range of motion 10% of affected joints    Strength BUE:  refer to OT eval  RLE:  4-/5  LLE:  4-/5  Increase strength in affected mm groups by 1/3 grade   Balance Sitting EOB:  fair +  Dynamic Standing:  poor + wheeled walker   Sitting EOB: fair plus  Dynamic Standing: fair minus with wheeled walker   Sitting EOB:  good   Dynamic Standing: fair +     Patient is Alert & Oriented x person, place and time and follows one step directions    Sensation:  Patient  denies numbness/tingling   Edema:  no   Endurance: fair  -    Vitals: 2 liters nasal cannula   Blood Pressure at rest  Blood Pressure during session    Heart Rate at rest  Heart Rate during session    SPO2 at rest  %  SPO2 during session %     Patient education  Patient educated on  importance of mobility while in hospital , hip precautions and safety      Patient response to education:   Pt verbalized understanding Pt demonstrated skill Pt requires further education in this area   Yes Partial Yes Treatment:  Patient practiced and was instructed/facilitated in the following treatment:   Patient assisted to EOB. Sat edge of bed 5 minutes with Supervision  to increase dynamic sitting balance and activity tolerance. seated and standing challenges Patient stood x 2 reps min  assist  ; ambulated to chair with wheeled walker min a     Therapeutic Exercises:  not performed       At end of session, patient in chair with daughter present call light and phone within reach,  all lines and tubes intact, nursing notified. Patient would benefit from continued skilled Physical Therapy to improve functional independence and quality of life.          Patient's/ family goals   home with HHPT    Time in  1205  Time out  1238    Total Treatment Time  33 minutes  CPT codes:     Therapeutic activities (86612)   15 minutes  1 unit(s)  Gait Training (78841) 10 minutes 1 unit(s)  Non billable time 8 minutes    Carey Spencer PT

## 2022-02-25 NOTE — CARE COORDINATION
SOCIAL WORK / DISCHARGE PLANNING:  COVID neg 2/23. Sw met with pt's dtr, Chidi Fowler at bedside. She states the family and hired caregivers provide 24/7 care for pt. He has all dme needed except he does not have home O2, has been using 2L here. He had aide services from [facility] 12 hrs/ week covered by [facility]. He sees Dr Dario Amador at the [facility] / Dr Shamika Guevara outside [facility]. Dc plan is to return pt home with previous caregiving system. Dtr is interested in [facility] for therapy, list declined and no preference other than covered by insurance. HHC order will be needed at [place]. Dtr states her spouse will provide home going transport for pt. Addendum: 230pm Sw made referral to Tana Munguia await review to follow.                  Electronically signed by ANA Hazel on 2/25/2022 at 12:18 PM

## 2022-02-25 NOTE — PROGRESS NOTES
Report received from nightshift RN. Patient awake and alert in room, oriented x4 but markedly hard of hearing, with no complaints of pain or discomfort at this time. Granddaughter at bedside assisting with breakfast. Vital signs reviewed. Telemetry monitor displaying chronic afib RC HR 90bpm. SpO2 found to be 82-85% on room air with multiple site checks, patient placed on 2L NC, SpO2 increased to 93%. Lung sounds diminished with fine crackles on right side. Labs and orders reviewed. Will assume care of patient.

## 2022-02-26 NOTE — CARE COORDINATION
SS Note: SW Consult for SUSANNE ADAMS [facility]- PT/OT -- Discharge today 2/26\". Referral called and faxed to Clint Oneal at [facility], pt is accepted with start of care on Monday 2/28.   Electronically signed by ANA Figueroa on 2/26/2022 at 1:57 PM

## 2022-02-26 NOTE — PROGRESS NOTES
Upon entering room this RN found the extended dwelling catheter out of the patients arm. No active bleeding present and no blood on the bed. The daughter requested that I put it back to which I informed her that he would need a new IV site. She was very upset as the lab draws bother the pt.

## 2022-02-26 NOTE — PLAN OF CARE
Problem: Falls - Risk of:  Goal: Will remain free from falls  Description: Will remain free from falls  Outcome: Met This Shift  Goal: Absence of physical injury  Description: Absence of physical injury  Outcome: Met This Shift     Problem: Skin Integrity:  Goal: Will show no infection signs and symptoms  Description: Will show no infection signs and symptoms  Outcome: Met This Shift  Goal: Absence of new skin breakdown  Description: Absence of new skin breakdown  Outcome: Met This Shift     Problem: Injury - Risk of, Physical Injury:  Goal: Will remain free from falls  Description: Will remain free from falls  Outcome: Met This Shift  Goal: Absence of physical injury  Description: Absence of physical injury  Outcome: Met This Shift     Problem: Sleep Pattern Disturbance:  Goal: Appears well-rested  Description: Appears well-rested  Outcome: Met This Shift

## 2022-02-26 NOTE — DISCHARGE SUMMARY
Internal Medicine Progress Note     [facility]     Jarrett Yeyomohinder. Derrick Wellington., ZAHEER.A.CWesleyOWesleyI. Laxmi Ta D.O., PEARLOGRACIELA Anne D.O. Sharad Hernandez, MSN, APRN, NP-C  Queta Kamara. Librado De Jesus, MSN, APRN-CNP       Internal Medicine  Discharge Summary    NAME: Erika Chaney  :  1925  MRN:  [id number]  PCP:Pavan Bella DO  ADMITTED: 2022      DISCHARGED: 22    ADMITTING PHYSICIAN: Rao Cordova DO    CONSULTANT(S):   IP CONSULT TO DIETITIAN     ADMITTING DIAGNOSIS:   Acute respiratory failure (HCC) [J96.00]  Acute respiratory failure with hypoxia (HCC) [J96.01]  Pneumonia of right lower lobe due to infectious organism [J18.9]  Acute congestive heart failure, unspecified heart failure type ([address] [I50.9]     DISCHARGE DIAGNOSES:   1. Acute respiratory failure with hypoxia secondary to decompensated diastolic congestive heart failure with findings of bilateral pleural effusions status post right thoracentesis performed on  with removal of 1690 mL of straw-colored pleural fluid needing transudate of criteria  2. Consideration for community-acquired pneumonia with ultimate negative cultures and normal procalcitonin and discontinuation of antibiotic  3. Anemia of chronic disease. 4. Coronary artery disease  5. History of CVA  6. Hyperlipidemia  7. Parkinson's disease      BRIEF HISTORY OF PRESENT ILLNESS:   Jenn Reynolds is a 59-year-old gentleman who presented to [facility] emergency department accompanied by his daughter and granddaughter. The patient has experienced increasing shortness of breath with overall failure to thrive over the past week. He has an extensive past medical history and has been declining recently. Work-up in the emergency department revealed a large right-sided pleural effusion and a moderate size left pleural effusion. He is currently on nasal cannula oxygen with mild agitation in the setting of dementia.   He does not appear infectious in nature and family describes no infectious symptomatology including febrile illness. He was admitted to [facility] in November of last year after suffering a hip fracture. He required temporary skilled nursing facility placement thereafter but has been residing at home with substantial family support. He does not appear overtly uncomfortable on examination. He does exhibit a shallow breathing pattern. We discussed moving forward with thoracentesis and family voiced understanding.     LABS[de-identified]  Lab Results   Component Value Date    WBC 3.5 (L) 02/26/2022    HGB 8.4 (L) 02/26/2022    HCT 27.1 (L) 02/26/2022     (L) 02/26/2022     02/26/2022    K 3.5 02/26/2022    CL 98 02/26/2022    CREATININE 0.9 02/26/2022    BUN 23 02/26/2022    CO2 27 02/26/2022    GLUCOSE 102 (H) 02/26/2022    ALT 9 02/23/2022    AST 48 (H) 02/23/2022    INR 1.3 02/24/2022     Lab Results   Component Value Date    INR 1.3 02/24/2022    INR 1.1 10/13/2021    INR 1.1 08/13/2021    PROTIME 14.7 (H) 02/24/2022    PROTIME 12.1 10/13/2021    PROTIME 12.5 (H) 08/13/2021      Lab Results   Component Value Date    TSH 1.970 02/24/2022     Lab Results   Component Value Date    TRIG 41 02/24/2022    TRIG 98 10/21/2021    TRIG 44 08/13/2021     Lab Results   Component Value Date    HDL 50 02/24/2022    HDL 59 08/13/2021    HDL 56 05/13/2018     Lab Results   Component Value Date    LDLCALC 39 02/24/2022    LDLCALC 53 08/13/2021    LDLCALC 47 05/13/2018     Lab Results   Component Value Date    LABA1C 5.3 08/13/2021       IMAGING:  Echo Complete    Result Date: 2/25/2022  Transthoracic Echocardiography Report (TTE)  Demographics   Patient Name         Lolly Simental Gender                Male   Medical Record       [id number]       Room Number           7569  Number   Account #            [de-identified]      Procedure Date        02/25/2022   Corporate ID                        Ordering Physician   Accession Number     [id number]     Referring Physician   Date of Birth        08/07/1925     Sonographer           Stephanie SPARKS   Age                  80 year(s)     Interpreting          Courtney Chavez DO                                      Physician                                       Any Other  Procedure Type of Study   TTE procedure:Echo Complete W/Doppler & Color Flow. Procedure Date Date: 02/25/2022 Start: 11:11 AM Study Location: Echo Lab Technical Quality: Adequate visualization Indications:LV function. Patient Status: Routine Height: 66 inches Weight: 135 pounds BSA: 1.69 m^2 BMI: 21.79 kg/m^2 Rhythm: Within normal limits HR: 69 bpm BP: 156/101 mmHg  Findings   Left Ventricle  Left ventricular size is grossly normal.  Mild left ventricular concentric hypertrophy noted. Ejection fraction is visually estimated at 50-55%. No evidence of left ventricular mass or thrombus noted. No regional wall motion abnormalities seen. Right Ventricle  Normal right ventricular size and function. Left Atrium  The left atrium is mildly dilated. Interatrial septum appears intact. Right Atrium  Normal right atrium size. Mitral Valve  Mild to moderate mitral regurgitation is present. No evidence of mitral valve stenosis. Tricuspid Valve  Mild tricuspid regurgitation. Aortic Valve  The aortic valve is trileaflet. The aortic valve appears mildly sclerotic. No evidence of aortic valve regurgitation. No hemodynamically significant aortic stenosis is present. Pulmonic Valve  Pulmonic valve is structurally normal.   Pericardial Effusion  No evidence of pericardial effusion. Aorta  The aorta is within normal limits. Miscellaneous  Irregular rhythm. Conclusions   Summary  Left ventricular size is grossly normal.  Mild left ventricular concentric hypertrophy noted. Ejection fraction is visually estimated at 50-55%. No evidence of left ventricular mass or thrombus noted. No regional wall motion abnormalities seen. The left atrium is mildly dilated. Interatrial septum appears intact. Mild to moderate mitral regurgitation is present. No evidence of mitral valve stenosis. The aortic valve is trileaflet. The aortic valve appears mildly sclerotic. No evidence of aortic valve regurgitation. No hemodynamically significant aortic stenosis is present. Mild tricuspid regurgitation. Irregular rhythm.    Signature   ----------------------------------------------------------------  Electronically signed by Bessie Montoya DO(Interpreting  physician) on 02/25/2022 06:36 PM  ----------------------------------------------------------------  M-Mode/2D Measurements & Calculations   LV Diastolic    LV Systolic Dimension: 2.8   AV Cusp Separation: 1.4 cmLA  Dimension: 3.9  cm                           Dimension: 3.8 cmAO Root  cm              LV Volume Diastolic: 89.5 ml Dimension: 2.7 cm  LV FS:28.2 %    LV Volume Systolic: 80.5 ml  LV PW           LV EDV/LV EDV Index: 00.0  Diastolic: 1 cm IG/20 AO/O^2ZD ESV/LV ESV  Septum          Index: 28.8 ml/17ml/ m^2     RV Diastolic Dimension: 2.9  Diastolic: 1.3  EF Calculated: 57 %          cm  cm              LV Mass Index: 88 l/min*m^2  CO: 2.56 l/min  LV Length: 6.9 cm            Ascending Aorta: 3.1 cm  CI: 1.51                                     LA volume/Index: 55.3 ml  l/m*m^2         LVOT: 2.1 cm                 /32.75ml/m^2  LV Mass: 149.54  g  Doppler Measurements & Calculations   MV Peak E-Wave: 0.84 AV Peak Velocity:   LVOT Peak Velocity: 0.76 m/s  m/s                  0.93 m/s            LVOT Mean Velocity: 0.48 m/s  MV Peak A-Wave: 0.22 AV Peak Gradient:   LVOT Peak Gradient: 2.3 mmHgLVOT  m/s                  3.44 mmHg           Mean Gradient: 1.1 mmHg  MV E/A Ratio: 3.79   AV Mean Velocity:  MV Peak Gradient: 3  0.71 m/s  mmHg                 AV Mean Gradient:  MV Mean Gradient:    2.2 mmHg            TR Velocity:2.65 m/s  1.3 mmHg             AV VTI: 18.5 cm     TR reformatted images are provided for review. MIP images are provided for review. Dose modulation, iterative reconstruction, and/or weight based adjustment of the mA/kV was utilized to reduce the radiation dose to as low as reasonably achievable. COMPARISON: The previous study performed 10/13/2021. Correlation is made with chest radiograph performed the same day as this study. HISTORY: ORDERING SYSTEM PROVIDED HISTORY: shortness of breath TECHNOLOGIST PROVIDED HISTORY: Reason for exam:->shortness of breath Decision Support Exception - unselect if not a suspected or confirmed emergency medical condition->Emergency Medical Condition (MA) FINDINGS: Pulmonary Arteries: Pulmonary arteries are adequately opacified for evaluation. No evidence of intraluminal filling defect to suggest pulmonary embolism. Main pulmonary artery is normal in caliber. Mediastinum: No axillary, hilar, or mediastinal lymphadenopathy is appreciated. The thoracic aorta is again atherosclerotic. Cardiomegaly is again seen. The thyroid gland is poorly visualized. The esophagus and trachea are normal in appearance. Lungs/pleura: A large right pleural effusion is identified. A moderate-sized left pleural effusion is seen. Associated compressive atelectasis is identified involving the lower lobes. Centrilobular emphysema is again noted, as well as pulmonary parenchymal fibrotic changes in the lung apices. There are diffuse, increased hazy opacities throughout the aerated lungs bilaterally, with some dependency present, especially on the right. Given the cardiomegaly and pleural effusions, this most likely represents pulmonary edema/CHF. No pulmonary parenchymal nodule or mass is identified. Upper Abdomen: Limited images of the upper abdomen are unremarkable. Soft Tissues/Bones: No acute bone or soft tissue abnormality. 1.  No evidence of pulmonary embolism or acute pulmonary abnormality. 2.  Large right pleural effusion.   Moderate-sized left pleural effusion. 3.  Findings most likely representing pulmonary edema/CHF, as described above. 4.  Centrilobular emphysema again identified, when compared to the previous CTA performed 10/13/2021. IR GUIDED THORACENTESIS PLEURAL    Result Date: 2/24/2022  PROCEDURE: Iraida Moore RIGHT THORACENTESIS 2/24/2022 HISTORY: ORDERING SYSTEM PROVIDED HISTORY: IR guided thoracentesis TECHNOLOGIST PROVIDED HISTORY: Reason for exam:->IR guided thoracentesis Which side should the procedure be performed?->Right TECHNIQUE: Informed consent was obtained after a detailed explanation of the procedure including risks, benefits, and alternatives. Universal protocol was performed. The right chest was prepped and draped in sterile fashion and local anesthesia was achieved with lidocaine. A 5 needle sheath was advanced under ultrasound guidance into pleural effusion and thoracentesis was performed. The patient tolerated the procedure well. FINDINGS: A total of 1690 cc of straw-colored pleural fluid was removed. Status post ultrasound guided thoracentesis. HOSPITAL COURSE:   Bruce Ashton did very well throughout the hospitalization. He initially presented to the hospital profoundly weak and deconditioned with increased shortness of breath and inability to complete activities of daily living. Complete work-up was undertaken suggesting bilateral pleural effusions necessitating thoracentesis with removal of 1600 cc of transudate of effusion. 2D echocardiogram indicated preserved ejection fraction with diastolic dysfunction. He worked with the therapy teams and his overall condition improved dramatically. There was concern for underlying community-acquired pneumonia but cultures and pro calcitonin returned normal.  Antibiotic therapy has been discontinued. I had a long conversation with the patient's daughter and granddaughter at the bedside today.   We will provide a Lasix prescription to be utilized as needed in the setting of volume retention. We discussed no symptoms compatible with fluid retention. Otherwise, we maintain a fluid and salt restricted diet. His mentation has improved to his baseline. His home medications have been resumed. He is ultimately acceptable for discharge home with home health care. BRIEF PHYSICAL EXAMINATION AND LABORATORIES ON DAY OF DISCHARGE:  VITALS:  /68   Pulse 89   Temp 97.5 °F (36.4 °C) (Infrared)   Resp 18   Ht 5' 6\" (1.676 m)   Wt 124 lb 9.6 oz (56.5 kg)   SpO2 96%   BMI 20.11 kg/m²     HEENT:  PERRLA. EOMI. Sclera clear. Buccal mucosa moist.  Nasal cannula oxygen was weaned off during my examination. Neck:  Supple. Trachea midline. No thyromegaly. No JVD. No bruits. Heart:  Rhythm regular, rate controlled. Systolic murmur. Lungs:  Symmetrical.  Significantly better aeration throughout. Abdomen: Soft. Non-tender. Non-distended. Bowel sounds positive. No organomegaly or masses. No pain on palpation    Extremities:  Peripheral pulses present. No peripheral edema. No ulcers. Neurologic:  Alert x 3. No focal deficit. Cranial nerves grossly intact. Obvious signs of dementia. Skin:  No petechia. No hemorrhage. No wounds. DISPOSITION:  The patient's condition is good. At this time the patient is without objective evidence of an acute process requiring continuing hospitalization or inpatient management. They are stable for discharge with outpatient follow-up. I have spoken with the patient and discussed the results of the current hospitalization, in addition to providing specific details for the plan of care and counseling regarding the diagnosis and prognosis. The plan has been discussed in detail and they are aware of the specific conditions for emergent return, as well as the importance of follow-up.   Their questions are answered at this time and they are agreeable with the plan for discharge to home    DISCHARGE MEDICATIONS: Current Discharge Medication List           Details   furosemide (LASIX) 20 MG tablet Take 1 tablet by mouth daily as needed (Swelling and SOB)  Qty: 30 tablet, Refills: 0              Details   pravastatin (PRAVACHOL) 20 MG tablet Take 1 tablet by mouth daily Take one half tablet at bedtime. Qty: 30 tablet, Refills: 0              Details   aspirin 81 MG EC tablet Take 81 mg by mouth at bedtime       !! primidone (MYSOLINE) 50 MG tablet Take 50 mg by mouth Daily with supper      guaiFENesin (ROBITUSSIN) 100 MG/5ML SOLN oral solution Take 200 mg by mouth daily      ! ! primidone (MYSOLINE) 50 MG tablet Take 50 mg by mouth every morning       carbidopa-levodopa (SINEMET)  MG per tablet Take 1 tablet by mouth 3 times daily  Qty: 270 tablet, Refills: 1      acetaminophen (TYLENOL) 325 MG tablet Take 650 mg by mouth every 6 hours as needed for Pain or Fever      melatonin 5 MG TABS tablet Take 5 mg by mouth nightly      omeprazole 20 MG EC tablet Take 20 mg by mouth in the morning and at bedtime       sertraline (ZOLOFT) 25 MG tablet Take 25 mg by mouth daily      [employer] VITAMIN A PO Take 2,400 mcg by mouth Daily      polyethylene glycol (MIRALAX) PACK packet Take 17 g by mouth daily       ammonium lactate (LAC-HYDRIN) 12 % lotion Apply topically as needed for Dry Skin Apply topically as needed. mineral oil-hydrophilic petrolatum (HYDROPHOR) ointment Apply topically as needed for Dry Skin Apply topically as needed. vitamin D 1000 UNITS CAPS Take 1,000 Units by mouth Daily with supper       donepezil (ARICEPT) 10 MG tablet Take 10 mg by mouth nightly. !! - Potential duplicate medications found. Please discuss with provider. FOLLOW UP/INSTRUCTIONS:  · This patient is instructed to follow-up with his primary care physician. · Patient is instructed to follow-up with the consults listed above as directed by them.   · he is instructed to resume home medications and take new medications as indicated in the list above. · If the patient has a recurrence of symptoms, he is instructed to go to the ED. Preparing for this patient's discharge, including paperwork, orders, instructions, and meeting with patient did require > 40 minutes.     Sheri Gunderson DO   2/26/2022  11:00 AM

## 2022-02-26 NOTE — PROGRESS NOTES
Physical Therapy Treatment Note/Plan of Care    Room #:  2173/4882-79  Patient Name: Arvell Harada  YOB: 1925  MRN: [id number]    Date of Service: 2/26/2022     Tentative placement recommendation: Home Health Physical Therapy with 24/7 assist (family requesting HHPT, no subacute rehab)  Equipment recommendation: None      Evaluating Physical Therapist: Caroline Dow #[id number]      Specific Provider Orders/Date/Referring Provider :   02/23/22 1230   PT eval and treat Start: 02/23/22 1230, End: 02/23/22 1230, ONE TIME, Standing Count: 1 Occurrences, R    Karlene Umana DO      Admitting Diagnosis:   Acute respiratory failure ([address] [J96.00]  Acute respiratory failure with hypoxia ([address] [J96.01]  Pneumonia of right lower lobe due to infectious organism [J18.9]  Acute congestive heart failure, unspecified heart failure type [facility]) [I50.9]      Surgery:      HIP SURGERY Left 10/14/2021      HIP HEMIARTHROPLASTY performed by Zafar Powell MD at [address] [place] [address]       Visit Diagnoses       Codes    Acute respiratory failure with hypoxia ([address]    -  Primary J96.01    Acute congestive heart failure, unspecified heart failure type ([address]     I50.9          Patient Active Problem List   Diagnosis    Sepsis ([address]    Pneumonia    CAD (coronary artery disease)    Hyperlipidemia    Dysphagia    Subdural hematoma ([address]    H/O: CVA (cerebrovascular accident)    Essential hypertension    Tremors of nervous system    Dementia ([address]    Thrombocytopenia ([address]    Anemia    Urinary retention    Drop attack    Protein-calorie malnutrition, moderate ([address]    Delirium secondary to multiple medical problems    Postoperative anemia due to acute blood loss    Peptic ulcer disease with hemorrhage    Influenza B    Sepsis secondary to influenza B ([address]    GI bleed    Multiple skin tears    Closed left hip fracture, initial encounter ([address]    MARIETTA (acute kidney injury) ([address]    History of left hip hemiarthroplasty    Age-related macular degeneration    Acute respiratory failure (HCC)        ASSESSMENT of Current Deficits Patient exhibits decreased strength, balance and endurance impairing functional mobility, transfers, gait  and tolerance to activity. Patient needing encouragement to perform increased reps for exercise; AAROM needed. Left hip pain noted with some movements, tactile cues needed to perform most of exercises today. The patient will benefit from continued skilled therapy to increase strength and improve balance for safe functional mobility, to decrease risk of falls, and to meet goals at discharge. PHYSICAL THERAPY  PLAN OF CARE     Physical therapy plan of care is established based on physician order,  patient diagnosis and clinical assessment    Current Treatment Recommendations:    -Bed Mobility: Lower extremity exercises   -Sitting Balance: Incorporate reaching activities to activate trunk muscles   -Standing Balance: Perform strengthening exercises in standing to promote motor control with or without upper extremity support   -Transfers: Provide instruction on proper hand and foot position for adequate transfer of weight onto lower extremities and use of gait device if needed and Cues for hand placement, technique and safety. Provide stabilization to prevent fall   -Gait: Gait training and Standing activities to improve: base of support, weight shift, weight bearing      PT long term treatment goals are located in below grid    Patient and or family understand(s) diagnosis, prognosis, and plan of care. Frequency of treatments: Patient will be seen  daily.      Prior Level of Function: Patient ambulated with wheeled walker and assistance   Rehab Potential: good  for baseline    Past medical history:   Past Medical History:   Diagnosis Date    Anemia     Anxiety     CAD (coronary artery disease)     Candida esophagitis (HCC)     [facility] with [facility]     Chronic kidney disease     Hemorrhage     right eye    Hyperlipidemia     Macular degeneration     Parkinson's disease ([facility]     Raynaud disease     Tremors of nervous system     Vitamin D deficiency      Past Surgical History:   Procedure Laterality Date    CORONARY ANGIOPLASTY WITH STENT PLACEMENT      HIP SURGERY Left 10/14/2021    HIP HEMIARTHROPLASTY performed by Roland Rosas MD at [address] N/A 11/13/2019    EGD BIOPSY performed by Leighton Greene MD at [address] [facility] N/A 10/20/2021    EGD ESOPHAGOGASTRODUODENOSCOPY PEG TUBE INSERTION performed by Santosh iMx MD at [address]:    Precautions: Activity as tolerated, falls , [place], hip hemiarthroplasty oct 2021, tremors,  Right eye deficits, raynauds disease   Social history: Patient lives with daughter in a two story home resides first  with 3 steps  to enter bilateral Rail  Tub shower grab bars  Dtr works ~ 10-12 hours/day - Pt has Aides for ~ 12 hours when Dtr is at Work. 24/7 SUP  Equipment owned: Cane, [employer], Rollator, Shower chair and Handicap height commode,      AM-PAC Basic Mobility       AM-[facility] Mobility Inpatient   How much difficulty turning over in bed?: A Little  How much difficulty sitting down on / standing up from a chair with arms?: A Little  How much difficulty moving from lying on back to sitting on side of bed?: A Little  How much help from another person moving to and from a bed to a chair?: A Little  How much help from another person needed to walk in hospital room?: A Little  How much help from another person for climbing 3-5 steps with a railing?: A Lot  AM-PAC Inpatient Mobility Raw Score : 17  AM-PAC Inpatient T-Scale Score : 42.13  Mobility Inpatient CMS 0-100% Score: 50.57  Mobility Inpatient CMS G-Code Modifier : CK    Nursing cleared patient for PT treatment.       OBJECTIVE;   Initial Evaluation  Date: 2/24/2022 Treatment Date:    2/26/2022    Short Term/ Long Term   Goals   Was pt agreeable to Eval/treatment? Yes yes To be met in 4 days   Pain level   6/10  Left hip  No complaints    Bed Mobility    Rolling: Minimal assist of 1    Supine to sit: Moderate assist of 1    Sit to supine: Moderate assist of 1    Scooting: Minimal assist of 1   Rolling: Not assessed    Supine to sit: Not assessed    Sit to supine: Not assessed    Scooting: Not assessed   Pt already up in chair upon entering room with family member present . Rolling: Independent    Supine to sit: Independent    Sit to supine: Independent    Scooting: Independent     Transfers Sit to stand: Maximal assist of 1  Sit to stand: Minimal assist of 2 . Pt  Stood x ~  1  min.     Sit to stand: Minimal assist of 1    Ambulation    not assessed  not assessed   20 feet using  wheeled walker with Minimal assist of 1    Stair negotiation: ascended and descended   Not assessed  not assessed    3 steps with HR and min A   ROM Within functional limits    Increase range of motion 10% of affected joints    Strength BUE:  refer to OT eval  RLE:  4-/5  LLE:  4-/5  Increase strength in affected mm groups by 1/3 grade   Balance Sitting EOB:  fair +  Dynamic Standing:  poor + wheeled walker   Sitting EOB: not assessed  Dynamic Standing: not assessed  Sitting EOB:  good   Dynamic Standing: fair +     Patient is Alert & Oriented x person, place and time and follows one step directions    Sensation:  Patient  denies numbness/tingling   Edema:  no   Endurance: fair  -    Vitals: 2 liters nasal cannula   Blood Pressure at rest  Blood Pressure during session    Heart Rate at rest  Heart Rate during session    SPO2 at rest  %  SPO2 during session %     Patient education  Patient educated on  importance of mobility while in hospital , hip precautions and safety      Patient response to education:   Pt verbalized understanding Pt demonstrated skill Pt requires further education in this area   Yes Partial Yes      Treatment:  Patient practiced and was instructed/facilitated in the following treatment:  performed supine exercise AAROM. Educated grand daughter on exercises and hip precautions. Therapeutic Exercises:  ankle pumps, long arc quad and seated marching x 10 reps - fatigue       At end of session, patient in chair with grand daughter present call light and phone within reach,  all lines and tubes intact, nursing notified. Patient would benefit from continued skilled Physical Therapy to improve functional independence and quality of life.          Patient's/ family goals   home with HHPT    Time in  1040 am  Time out  1105 am    Total Treatment Time  25  minutes  CPT codes:     Therapeutic activities (49451)   15 minutes  1 unit(s)  Therapeutic exercises (85354)   10 minutes  1 unit(s)       Kim Pugh PTA lic# [id number]

## 2022-02-26 NOTE — PROGRESS NOTES
Pt was given his morning protonix crushed in pudding. Pt spit the pudding onto the floor. He was then given some water and he spit that onto the floor as well. Caregiver at bedside stated that he does this sometimes.

## 2022-03-02 NOTE — TELEPHONE ENCOUNTER
----- Message from Jonelle Elias sent at 3/1/2022 10:57 AM EST -----  Subject: Appointment Request    Reason for Call: Urgent (Patient Request) Hospital Follow Up    QUESTIONS  Type of Appointment? Established Patient  Reason for appointment request? No appointments available during search  Additional Information for Provider? Patients ajith Kent is calling in   to make an appointment for a hospital follow up, the hospital stated that   he has to been seen within the next 7 days. Patient would like to see   Israel Lamas and the first appointment for him was March 18th and   there was not anything sooner. She is needed an appointment within the   next 7 days. Please call her back as soon as possible. Thank you.  ---------------------------------------------------------------------------  --------------  CALL BACK INFO  What is the best way for the office to contact you? OK to leave message on   voicemail  Preferred Call Back Phone Number? [phone]  ---------------------------------------------------------------------------  --------------  SCRIPT ANSWERS  Relationship to Patient? Other  Representative Name? [facility]   Additional information verified (besides Name and Date of Birth)? Phone   Number  (Patient requests to see provider urgently. )? Yes  (Patient needs follow up visit after hospital discharge) Book first   available appointment within 7 days OF DISCHARGE, if no appt, proceed to   book the next available time slot within 14 days OF DISCHARGE AND Send   Message to Provider. [address] [facility] Follow Up appointment cannot be booked   beyond 14 Days and should result in a Message to Provider. ? Yes   Have you been diagnosed with, awaiting test results for, or told that you   are suspected of having COVID-19 (Coronavirus)? (If patient has tested   negative or was tested as a requirement for work, school, or travel and   not based on symptoms, answer no)?  No  Within the past 10 days have you developed any of the following symptoms   (answer no if symptoms have been present longer than 10 days or began   more than 10 days ago)? Fever or Chills, Cough, Shortness of breath or   difficulty breathing, Loss of taste or smell, Sore throat, Nasal   congestion, Sneezing or runny nose, Fatigue or generalized body aches   (answer no if pain is specific to a body part e.g. back pain), Diarrhea,   Headache?  Yes

## 2022-03-03 NOTE — PROGRESS NOTES
x-ray in the future as needed. Discussion held regarding the statin that was prescribed at hospital-son agrees that there is no need to discontinue this medication at this point.     Patient Active Problem List   Diagnosis    Sepsis ([address]    Pneumonia    CAD (coronary artery disease)    Hyperlipidemia    Dysphagia    Subdural hematoma (HCC)    H/O: CVA (cerebrovascular accident)    Essential hypertension    Tremors of nervous system    Dementia [facility]    Thrombocytopenia ([facility]    Anemia    Urinary retention    Drop attack    Protein-calorie malnutrition, moderate ([address]    Delirium secondary to multiple medical problems    Postoperative anemia due to acute blood loss    Peptic ulcer disease with hemorrhage    Influenza B    Sepsis secondary to influenza B ([address]    GI bleed    Multiple skin tears    [facility]    MARIETTA (acute kidney injury) ([facility]    History of left hip hemiarthroplasty    Age-related macular degeneration    Acute respiratory failure (HCC)       Medications listed as ordered at the time of discharge from hospital  [unfilled]      Medications marked \"taking\" at this time  Outpatient Medications Marked as Taking for the 3/3/22 encounter (Office Visit) with Sly Nunez, DO   Medication Sig Dispense Refill    furosemide (LASIX) 20 MG tablet Take 1 tablet by mouth daily as needed (Swelling and SOB) 30 tablet 0    aspirin 81 MG EC tablet Take 81 mg by mouth at bedtime       primidone (MYSOLINE) 50 MG tablet Take 50 mg by mouth Daily with supper      guaiFENesin (ROBITUSSIN) 100 MG/5ML SOLN oral solution Take 200 mg by mouth daily      primidone (MYSOLINE) 50 MG tablet Take 50 mg by mouth every morning       carbidopa-levodopa (SINEMET)  MG per tablet Take 1 tablet by mouth 3 times daily 270 tablet 1    acetaminophen (TYLENOL) 325 MG tablet Take 650 mg by mouth every 6 hours as needed for Pain or Fever      melatonin 5 MG TABS tablet Take 5 mg by mouth nightly      omeprazole 20 MG EC tablet Take 20 mg by mouth in the morning and at bedtime       sertraline (ZOLOFT) 25 MG tablet Take 25 mg by mouth daily      [employer] VITAMIN A PO Take 2,400 mcg by mouth Daily      polyethylene glycol (MIRALAX) PACK packet Take 17 g by mouth daily       ammonium lactate (LAC-HYDRIN) 12 % lotion Apply topically as needed for Dry Skin Apply topically as needed.  mineral oil-hydrophilic petrolatum (HYDROPHOR) ointment Apply topically as needed for Dry Skin Apply topically as needed.  vitamin D 1000 UNITS CAPS Take 1,000 Units by mouth Daily with supper       donepezil (ARICEPT) 10 MG tablet Take 10 mg by mouth nightly. Medications patient taking as of now reconciled against medications ordered at time of hospital discharge: Yes        Objective:    /68   Pulse 72   Temp 97.3 °F (36.3 °C) (Temporal)   Ht 5' 8\" (1.727 m)   Wt 135 lb (61.2 kg)   SpO2 94%   BMI 20.53 kg/m²   General Appearance: oriented to person. Appears stated age. No outward signs of distress.    Skin: warm and dry, no rash or erythema  Head: normocephalic and atraumatic  Eyes: pupils equal, round, and reactive to light, extraocular eye movements intact, conjunctivae normal  Neck: supple and non-tender without mass, no thyromegaly or thyroid nodules, no cervical lymphadenopathy  Pulmonary/Chest: clear to auscultation bilaterally- no wheezes, rales or rhonchi, normal air movement, no respiratory distress  Cardiovascular: normal rate, regular rhythm, normal S1 and S2, no murmurs, rubs, clicks, or gallops, distal pulses intact, no carotid bruits  Abdomen: soft, non-tender, non-distended, normal bowel sounds, no masses or organomegaly  Extremities: no cyanosis, clubbing or edema  Musculoskeletal: normal range of motion, no joint swelling, deformity or tenderness  Neurologic:  no cranial nerve deficit, resting tremor noted in hands    An electronic signature was used to authenticate this note.   --Garcia García, DO

## 2022-04-29 NOTE — TELEPHONE ENCOUNTER
Caty Friends son called in stating that his dad has a sore throat and swollen glands and would like an antibiotic. He does not want to take Ayan Danger to a walk in due to afraid he will get sicker.

## 2022-05-04 NOTE — TELEPHONE ENCOUNTER
Patients family would like to have hospice involved, would you need to see patient in office for this? Last appointment was 3/3/22.

## 2022-05-04 NOTE — TELEPHONE ENCOUNTER
----- Message from Laura Case sent at 5/3/2022  1:12 PM EDT -----  Subject: Referral Request    QUESTIONS   Reason for referral request? Patient's Daughter is calling in to get an   order for hospice. She would like a call back from the office to discuss   getting. Has the physician seen you for this condition before? No   Preferred Specialist (if applicable)? Do you already have an appointment scheduled? No  Additional Information for Provider?   ---------------------------------------------------------------------------  --------------  CALL BACK INFO  What is the best way for the office to contact you? OK to leave message on   voicemail  Preferred Call Back Phone Number? [phone]  ---------------------------------------------------------------------------  --------------  SCRIPT ANSWERS  Relationship to Patient? Other  Representative Name? Hattie Krishnan  Is the Representative on the appropriate HIPAA document in Epic?  Yes

## 2022-07-12 NOTE — TELEPHONE ENCOUNTER
Pts daughter called and asked if an order can be placed for the dental [facility] can be sent to pts home. Pts daughter stated pt has a bad tooth. When hospice nurses came out last week, stated pt had swelling in throat, was given Doxycycline 100mg x 7 days. Pt has finished Doxycycline and the hospice nurses believe the throat infection came from a bad tooth on left bottom side. Pt sees Dr. Jay Jay Amador, dental specialist, but daughter doesn't want to take him out of the house if at all possible. Please advise     Fabienne-Daughter, [phone].

## 2022-07-14 NOTE — TELEPHONE ENCOUNTER
Patients daughter called asking to get the phone number to dental [facility].  Number was provided to patients daughter per her request.

## 2022-07-28 NOTE — TELEPHONE ENCOUNTER
Syeda @ [facility] called and stated pt is c/o pneumonia and asked if an xray can be done. Please advise.

## 2022-12-11 NOTE — ANESTHESIA PRE PROCEDURE
Department of Anesthesiology  Preprocedure Note       Name:  Stu Wilkes   Age:  80 y.o.  :  1925                                          MRN:  [id number]         Date:  10/20/2021      Surgeon: Jt Good):  Romina Mark MD    Procedure: EGD ESOPHAGOGASTRODUODENOSCOPY PEG TUBE INSERTION (N/A )    Medications prior to admission:   Prior to Admission medications    Medication Sig Start Date End Date Taking? Authorizing Provider   apixaban (ELIQUIS) 2.5 MG TABS tablet Take 1 tablet by mouth 2 times daily 10/14/21  Yes Nataliia Elliott DO   oxyCODONE-acetaminophen (PERCOCET) 5-325 MG per tablet Take 1 tablet by mouth every 6 hours as needed for Pain for up to 7 days. Intended supply: 7 days. Take lowest dose possible to manage pain 10/14/21 10/21/21 Yes Nataliia Elliott DO   pantoprazole (PROTONIX) 40 MG tablet Take 1 tablet by mouth daily (with breakfast) 21   Quinton Randolph APRN - CNP   sertraline (ZOLOFT) 25 MG tablet Take 25 mg by mouth daily    Historical Provider, MD   [facility] VITAMIN A PO Take 2,400 mcg by mouth Daily    Historical Provider, MD   polyethylene glycol (MIRALAX) PACK packet Take 17 g by mouth 2 times daily    Historical Provider, MD   ammonium lactate (LAC-HYDRIN) 12 % lotion Apply topically as needed for Dry Skin Apply topically as needed. Historical Provider, MD   mineral oil-hydrophilic petrolatum (HYDROPHOR) ointment Apply topically as needed for Dry Skin Apply topically as needed. Historical Provider, MD   vitamin D 1000 UNITS CAPS Take by mouth daily     Historical Provider, MD   pravastatin (PRAVACHOL) 40 MG tablet Take 20 mg by mouth daily Take one half tablet at bedtime. Historical Provider, MD   enalapril (VASOTEC) 2.5 MG tablet Take 2.5 mg by mouth daily. Historical Provider, MD   primidone (MYSOLINE) 50 MG tablet Take 50 mg by mouth 2 times daily. Historical Provider, MD   donepezil (ARICEPT) 10 MG tablet Take 10 mg by mouth nightly.     Historical Provider, MD Current medications:    Current Facility-Administered Medications   Medication Dose Route Frequency Provider Last Rate Last Admin    carbidopa-levodopa (SINEMET)  MG per tablet 1 tablet  1 tablet Oral TID Vadim Mazariegos MD        dextrose 10 % infusion   IntraVENous Continuous Wang Urbano MD 50 mL/hr at 10/19/21 1242 New Bag at 10/19/21 1242    0.9 % sodium chloride infusion   IntraVENous PRN STEPHON Fitzgerald - CNP        barium sulfate 40 % reconsitutable suspension  15 mL Oral ONCE PRN Benson Saha MD        piperacillin-tazobactam (ZOSYN) 3,375 mg in dextrose 5 % 100 mL IVPB extended infusion (mini-bag)  3,375 mg IntraVENous Q8H Jihan Syed MD   Stopped at 10/20/21 0915    0.9 % sodium chloride infusion admixture  25 mL IntraVENous Q8H Jihan Syed MD   Stopped at 10/20/21 0913    oxyCODONE (ROXICODONE) immediate release tablet 5 mg  5 mg Oral Q4H PRN Wang Urbano MD        Or   Juarez oxyCODONE (ROXICODONE) immediate release tablet 2.5 mg  2.5 mg Oral Q4H PRN Wang Urbano MD        morphine (PF) injection 2 mg  2 mg IntraVENous Q3H PRN Wang Urbano MD   2 mg at 10/19/21 2046    Or    morphine (PF) injection 1 mg  1 mg IntraVENous Q3H PRN Wang Urbano MD        sodium chloride (OCEAN, BABY AYR) 0.65 % nasal spray 1 spray  1 spray Each Nostril PRN Wang Urbano MD        GenTeal Tears 0.1-0.2-0.3 % SOLN 1 drop  1 drop Both Eyes PRN Wang Urbano MD        Vitamin D (CHOLECALCIFEROL) tablet 2,000 Units  2,000 Units Oral Daily Alesha Govern, DO   2,000 Units at 10/15/21 0851    tranexamic acid (CYKLOKAPRON) irrigation 1,000 mg  1,000 mg Irrigation Once Alesha Govern, DO        sodium chloride flush 0.9 % injection 5-40 mL  5-40 mL IntraVENous 2 times per day Alesha Jose, DO   10 mL at 10/19/21 2103    sodium chloride flush 0.9 % injection 5-40 mL  5-40 mL IntraVENous PRN Alesha Garcia DO   10 mL at 10/15/21 0446    0.9 % sodium chloride infusion  25 mL IntraVENous PRN Gwendolyn Severe Elif Calvillo,         sennosides-docusate sodium (SENOKOT-S) 8.6-50 MG tablet 1 tablet  1 tablet Oral BID Lima Hillview, DO   1 tablet at 10/15/21 2224    acetaminophen (TYLENOL) tablet 650 mg  650 mg Oral Q6H Lima Hillview, DO   650 mg at 10/15/21 2224    promethazine (PHENERGAN) tablet 12.5 mg  12.5 mg Oral Q6H PRN Lima Hillview, DO        Or    ondansetron [facility]) injection 4 mg  4 mg IntraVENous Q6H PRN Lima Hillview, DO        0.9 % sodium chloride infusion   IntraVENous PRN Jorge Dunn MD        sodium chloride flush 0.9 % injection 10 mL  10 mL IntraVENous Once Lima Hillview, DO        donepezil (ARICEPT) tablet 10 mg  10 mg Oral Nightly Lima Hillview, DO   10 mg at 10/15/21 2222    [Held by provider] enalapril (VASOTEC) tablet 2.5 mg  2.5 mg Oral Daily Richmond Riff, APRN - CNP        pantoprazole (PROTONIX) tablet 40 mg  40 mg Oral Daily with breakfast Lima Hillview, DO   40 mg at 10/15/21 0846    pravastatin (PRAVACHOL) tablet 20 mg  20 mg Oral Daily Lima Hillview, DO   20 mg at 10/15/21 0846    primidone (MYSOLINE) tablet 50 mg  50 mg Oral BID Quitman Modena, DO   50 mg at 10/15/21 1801    sertraline (ZOLOFT) tablet 25 mg  25 mg Oral Daily Lima Hillview, DO   25 mg at 10/15/21 0846    sodium chloride flush 0.9 % injection 5-40 mL  5-40 mL IntraVENous 2 times per day Lima Hillview, DO   10 mL at 10/19/21 2103    sodium chloride flush 0.9 % injection 5-40 mL  5-40 mL IntraVENous PRN Lima Hillview, DO        0.9 % sodium chloride infusion  25 mL IntraVENous PRN Lima Hillview, DO        enoxaparin (LOVENOX) injection 40 mg  40 mg SubCUTAneous Daily Lima Hillview, DO   40 mg at 10/19/21 1152    magnesium hydroxide (MILK OF MAGNESIA) 400 MG/5ML suspension 30 mL  30 mL Oral Daily PRN Lima Hillview, DO           Allergies:     Allergies   Allergen Reactions    Ativan [Lorazepam] Other (See Comments)     Per family- makes pt more agitated and anxious    Clindamycin/Lincomycin     Pcn [Penicillins]        Problem List:    Patient Active Problem List   Diagnosis Code    Sepsis ([facility] A41.9    Pneumonia J18.9    CAD (coronary artery disease) I25.10    Hyperlipidemia E78.5    Dysphagia R13.10    Subdural hematoma ([address] S06.5X9A    H/O: CVA (cerebrovascular accident) Z80.78    Essential hypertension I10    Tremors of nervous system R25.1    Dementia ([facility] [address] F03.90    Thrombocytopenia ([address] D69.6    Anemia D64.9    Urinary retention R33.9    Drop attack R55    Protein-calorie malnutrition, moderate ([address] E44.0    Delirium secondary to multiple medical problems F05    Postoperative anemia due to acute blood loss D62    Peptic ulcer disease with hemorrhage K27.4    Influenza B J10.1    Sepsis secondary to influenza B (HCC) A41.9    GI bleed K92.2    Multiple skin tears T14. 8XXA    Closed left hip fracture, initial encounter ([facility] S72.002A    MARIETTA (acute kidney injury) ([facility] [address] N17.9       Past Medical History:        Diagnosis Date    CAD (coronary artery disease)     Cerebral artery occlusion with cerebral infarction ([address]     Hemorrhage     right eye    Raynaud disease     Tremors of nervous system        Past Surgical History:        Procedure Laterality Date    CORONARY ANGIOPLASTY WITH STENT PLACEMENT      HIP SURGERY Left 10/14/2021    HIP HEMIARTHROPLASTY performed by Morene Primrose, MD at [address] N/A 2019    EGD BIOPSY performed by Verito Nelson MD at [address] History:    Social History     Tobacco Use    Smoking status: Former Smoker     Types: Cigarettes     Quit date: 1976     Years since quittin.8    Smokeless tobacco: Never Used   Substance Use Topics    Alcohol use: Not Currently     Comment:                                  Counseling given: Not Answered      Vital Signs (Current):   Vitals:    10/19/21 2000 10/20/21 0030 10/20/21 0800 10/20/21 1208   BP: (!) 153/58 (!) 148/68 (!) 149/64    Pulse: 119 101 88    Resp: 16 16 16    Temp: 98.2 °F (36.8 °C) 97.5 °F (36.4 °C) 97.7 °F (36.5 °C)    TempSrc: Temporal Temporal Temporal    SpO2:   90%    Weight:       Height:    5' 9\" (1.753 m)                                              BP Readings from Last 3 Encounters:   10/20/21 (!) 149/64   10/20/21 (!) 157/72   10/14/21 (!) 78/59       NPO Status:  > 8hrs                                                                               BMI:   Wt Readings from Last 3 Encounters:   10/13/21 145 lb 1 oz (65.8 kg)   10/13/21 145 lb (65.8 kg)   09/20/21 135 lb (61.2 kg)     Body mass index is 21.42 kg/m². CBC:   Lab Results   Component Value Date    WBC 5.3 10/20/2021    RBC 2.62 10/20/2021    HGB 8.0 10/20/2021    HCT 24.4 10/20/2021    MCV 93.1 10/20/2021    RDW 15.0 10/20/2021     10/20/2021       CMP:   Lab Results   Component Value Date     10/20/2021    K 3.8 10/20/2021    K 4.8 10/15/2021     10/20/2021    CO2 24 10/20/2021    BUN 13 10/20/2021    CREATININE 1.1 10/20/2021    GFRAA >60 10/20/2021    LABGLOM >60 10/20/2021    GLUCOSE 139 10/20/2021    PROT 7.9 10/13/2021    CALCIUM 8.2 10/20/2021    BILITOT 0.4 10/13/2021    ALKPHOS 126 10/13/2021    AST 27 10/13/2021    ALT 13 10/13/2021       POC Tests: No results for input(s): POCGLU, POCNA, POCK, POCCL, POCBUN, POCHEMO, POCHCT in the last 72 hours.     Coags:   Lab Results   Component Value Date    PROTIME 12.1 10/13/2021    INR 1.1 10/13/2021    APTT 28.2 10/13/2021       HCG (If Applicable): No results found for: PREGTESTUR, PREGSERUM, HCG, HCGQUANT     ABGs: No results found for: PHART, PO2ART, KTE5XKC, FUI5CNT, BEART, [id number]     Type & Screen (If Applicable):  No results found for: LABABO, [address]    Anesthesia Evaluation  Patient summary reviewed and Nursing notes reviewed no history of anesthetic complications:   Airway: Mallampati: III  TM distance: >3 FB   Neck ROM: full  Mouth opening: > = 3 FB Dental: Comment: Poor dentition with multiple missing teeth    Pulmonary: breath sounds clear to auscultation  (+) pneumonia: resolved,                            ROS comment: Former smoker 2lpm Nasal canula   Cardiovascular:  Exercise tolerance: good (>4 METS),   (+) hypertension: moderate, CAD: obstructive, CABG/stent: no interval change,       ECG reviewed  Rhythm: regular  Rate: normal  Echocardiogram reviewed                  Neuro/Psych:   (+) CVA: residual symptoms, psychiatric history: stable with treatmentdepression/anxiety              ROS comment: Left eye blindness  Essential tremors  Dementia GI/Hepatic/Renal:   (+) PUD,          ROS comment: Ortiz in place with hematuria in urine. Endo/Other:    (+) blood dyscrasia: anemia and thrombocytopenia:., .                 Abdominal:         (-) obese       Vascular:   + PVD, aortic or cerebral, . Other Findings:               Anesthesia Plan      MAC     ASA 4     (PIV 22g L wrist)  Induction: intravenous. MIPS: Postoperative opioids intended, Prophylactic antiemetics administered and Postoperative trial extubation. Anesthetic plan and risks discussed with patient, child/children and healthcare power of . Use of blood products discussed with patient whom consented to blood products. Plan discussed with CRNA and attending.                   Pete Bland DO   10/20/2021 [id number]

## (undated) DEVICE — SYRINGE 20ML LL S/C 50

## (undated) DEVICE — 6 X 9  1.75MIL 4-WALL LABGUARD: Brand: MINIGRIP COMMERCIAL LLC

## (undated) DEVICE — 3M™ IOBAN™ 2 ANTIMICROBIAL INCISE DRAPE 6650EZ: Brand: IOBAN™ 2

## (undated) DEVICE — SET ORTHO STD STORTSTD2

## (undated) DEVICE — NEEDLE HYPO 18GA L1.5IN PNK POLYPR HUB S STL REG BVL STR

## (undated) DEVICE — E-Z CLEAN, NON-STICK, PTFE COATED, ELECTROSURGICAL BLADE ELECTRODE, 6.5 INCH (16.5 CM): Brand: MEGADYNE

## (undated) DEVICE — Z DISCONTINUED USE 2272117 DRAPE SURG 3 QTR N INVASIVE 2 LAYR DISP

## (undated) DEVICE — Device: Brand: DEFENDO VALVE AND CONNECTOR KIT

## (undated) DEVICE — YANKAUER,BULB TIP,W/O VENT,RIGID,STERILE: Brand: MEDLINE

## (undated) DEVICE — GLOVE SURG SZ 8 L12IN FNGR THK79MIL GRN LTX FREE

## (undated) DEVICE — HANDPIECE SET WITH BONE CLEANING TIP AND SUCTION TUBE: Brand: INTERPULSE

## (undated) DEVICE — ELECTRODE PT RET AD L9FT HI MOIST COND ADH HYDRGEL CORDED

## (undated) DEVICE — BLOCK BITE 60FR CAREGUARD

## (undated) DEVICE — LUBRICANT SURG JELLY ST BACTER TUBE 4.25OZ

## (undated) DEVICE — Device

## (undated) DEVICE — SET ORTHO ACCOLADE HEMI HIP BROACH

## (undated) DEVICE — SOLUTION IRRIG 3000ML 0.9% SOD CHL USP UROMATIC PLAS CONT

## (undated) DEVICE — APPLICATOR PREP 26ML 0.7% IOD POVACRYLEX 74% ISO ALC ST

## (undated) DEVICE — GOWN,AURORA,BRTHSLV,2XL,18/CS: Brand: MEDLINE

## (undated) DEVICE — Z DISCONTINUED PER MEDLINE USE 2741944 DRESSING AQUACEL 12 IN SURG W9XL30CM SIL CVR WTRPRF VIR BACT BARR ANTIMIC

## (undated) DEVICE — TOWEL,OR,DSP,ST,BLUE,DLX,10/PK,8PK/CS: Brand: MEDLINE

## (undated) DEVICE — SPONGE GZ 4IN 4IN 4 PLY N WVN AVANT

## (undated) DEVICE — DEFENDO AIR WATER SUCTION AND BIOPSY VALVE KIT FOR  OLYMPUS: Brand: DEFENDO AIR/WATER/SUCTION AND BIOPSY VALVE

## (undated) DEVICE — BLADE CLIPPER GEN PURP NS

## (undated) DEVICE — SET LAMBOTTE

## (undated) DEVICE — SET ORTHO CENTRAX CUPS

## (undated) DEVICE — RACK TUBE CURETTE

## (undated) DEVICE — BINDER ABD SM M W12IN CIRC 30 45IN 4 PNL E CNTCT CLSR POSTOP

## (undated) DEVICE — SET ORTHO STD STORTSTD1

## (undated) DEVICE — KIT BEDSIDE REVITAL OX 500ML

## (undated) DEVICE — TUBING, SUCTION, 1/4" X 10', STRAIGHT: Brand: MEDLINE

## (undated) DEVICE — FORCEPS BX L240CM JAW DIA2.8MM L CAP W/ NDL MIC MESH TOOTH

## (undated) DEVICE — STRYKER PERFORMANCE SERIES SAGITTAL BLADE: Brand: STRYKER PERFORMANCE SERIES

## (undated) DEVICE — CLOTH SURG PREP PREOPERATIVE CHLORHEXIDINE GLUC 2% READYPREP

## (undated) DEVICE — BITEBLOCK 54FR W/ DENT RIM BLOX

## (undated) DEVICE — MASK,FACE,MAXFLUIDPROTECT,SHIELD/ERLPS: Brand: MEDLINE

## (undated) DEVICE — NEEDLE HYPO 21GA L1.5IN GRN POLYPR HUB S STL REG BVL STR

## (undated) DEVICE — TOTAL HIP PK

## (undated) DEVICE — GLOVE ORANGE PI 8   MSG9080

## (undated) DEVICE — CONTAINER SPEC COLL 960ML POLYPR TRIANG GRAD INTAKE/OUTPUT

## (undated) DEVICE — GOWN ISOLATN REG YEL M WT MULTIPLY SIDETIE LEV 2

## (undated) DEVICE — DRESSING,GAUZE,XEROFORM,CURAD,5"X9",ST: Brand: CURAD

## (undated) DEVICE — BIT DRL L5IN DIA2.8MM STD ST S STL TWST BUSA

## (undated) DEVICE — KENDALL 450 SERIES MONITORING FOAM ELECTRODE - RECTANGULAR SHAPE ( 3/PK): Brand: KENDALL

## (undated) DEVICE — Z DISCONTINUED NO SUB IDED TUBING ETCO2 AD L6.5FT NSL ORAL CVD PRNG NONFLARED TIP OVR

## (undated) DEVICE — DRILL SYSTEM 7

## (undated) DEVICE — INTENDED FOR TISSUE SEPARATION, AND OTHER PROCEDURES THAT REQUIRE A SHARP SURGICAL BLADE TO PUNCTURE OR CUT.: Brand: BARD-PARKER ® STAINLESS STEEL BLADES

## (undated) DEVICE — GAUZE,SPONGE,POST-OP,4X3,STRL,LF: Brand: MEDLINE

## (undated) DEVICE — GLOVE ORTHO 8   MSG9480

## (undated) DEVICE — 1.5L THIN WALL CAN: Brand: CRD

## (undated) DEVICE — PILLOW POS W15XH6XL22IN RASPBERRY FOAM ABD W/ STRP DISP FOR

## (undated) DEVICE — SET ORTHO ACCOLADE HEMI HIP INSRT

## (undated) DEVICE — 3M™ STERI-DRAPE™ U-DRAPE 1015: Brand: STERI-DRAPE™

## (undated) DEVICE — DISPOSABLE DISTAL ATTACHMENT: Brand: DISPOSABLE DISTAL ATTACHMENT

## (undated) DEVICE — 1000 S-DRAPE TOWEL DRAPE 10/BX: Brand: STERI-DRAPE™

## (undated) DEVICE — SURGICAL PROCEDURE PACK BASIC

## (undated) DEVICE — SYRINGE MED 50ML LUERLOCK TIP

## (undated) DEVICE — GOWN,BREATHABLE,IMP SLV 3XL AURORA: Brand: MEDLINE

## (undated) DEVICE — 3M™ COBAN™ NL STERILE NON-LATEX SELF-ADHERENT WRAP, 2084S, 4 IN X 5 YD (10 CM X 4,5 M), 18 ROLLS/CASE: Brand: 3M™ COBAN™

## (undated) DEVICE — GOWN,BREATHABLE SLV,AURORA,XLG,STRL: Brand: MEDLINE